# Patient Record
Sex: MALE | Race: WHITE | NOT HISPANIC OR LATINO | Employment: OTHER | URBAN - METROPOLITAN AREA
[De-identification: names, ages, dates, MRNs, and addresses within clinical notes are randomized per-mention and may not be internally consistent; named-entity substitution may affect disease eponyms.]

---

## 2024-06-09 ENCOUNTER — HOSPITAL ENCOUNTER (INPATIENT)
Facility: HOSPITAL | Age: 84
LOS: 8 days | Discharge: HOME | End: 2024-06-18
Attending: STUDENT IN AN ORGANIZED HEALTH CARE EDUCATION/TRAINING PROGRAM | Admitting: FAMILY MEDICINE
Payer: MEDICARE

## 2024-06-09 ENCOUNTER — APPOINTMENT (OUTPATIENT)
Dept: CT IMAGING | Facility: HOSPITAL | Age: 84
End: 2024-06-09
Payer: MEDICARE

## 2024-06-09 ENCOUNTER — APPOINTMENT (OUTPATIENT)
Dept: GENERAL RADIOLOGY | Facility: HOSPITAL | Age: 84
End: 2024-06-09
Payer: MEDICARE

## 2024-06-09 DIAGNOSIS — R63.4 UNINTENTIONAL WEIGHT LOSS: ICD-10-CM

## 2024-06-09 DIAGNOSIS — K52.9 COLITIS: Primary | ICD-10-CM

## 2024-06-09 DIAGNOSIS — K52.9 CHRONIC DIARRHEA: ICD-10-CM

## 2024-06-09 LAB
ALBUMIN SERPL-MCNC: 3.4 G/DL (ref 3.5–5.2)
ALBUMIN/GLOB SERPL: 0.9 G/DL
ALP SERPL-CCNC: 87 U/L (ref 39–117)
ALT SERPL W P-5'-P-CCNC: 9 U/L (ref 1–41)
ANION GAP SERPL CALCULATED.3IONS-SCNC: 13.8 MMOL/L (ref 5–15)
AST SERPL-CCNC: 12 U/L (ref 1–40)
BACTERIA UR QL AUTO: NORMAL /HPF
BASOPHILS # BLD AUTO: 0.11 10*3/MM3 (ref 0–0.2)
BASOPHILS NFR BLD AUTO: 0.7 % (ref 0–1.5)
BILIRUB SERPL-MCNC: 0.8 MG/DL (ref 0–1.2)
BILIRUB UR QL STRIP: ABNORMAL
BUN SERPL-MCNC: 23 MG/DL (ref 8–23)
BUN/CREAT SERPL: 22.5 (ref 7–25)
CALCIUM SPEC-SCNC: 8.9 MG/DL (ref 8.6–10.5)
CHLORIDE SERPL-SCNC: 97 MMOL/L (ref 98–107)
CLARITY UR: CLEAR
CO2 SERPL-SCNC: 22.2 MMOL/L (ref 22–29)
COLOR UR: YELLOW
CREAT SERPL-MCNC: 1.02 MG/DL (ref 0.76–1.27)
D-LACTATE SERPL-SCNC: 1.6 MMOL/L (ref 0.5–2)
DEPRECATED RDW RBC AUTO: 44.6 FL (ref 37–54)
EGFRCR SERPLBLD CKD-EPI 2021: 72 ML/MIN/1.73
EOSINOPHIL # BLD AUTO: 0.19 10*3/MM3 (ref 0–0.4)
EOSINOPHIL NFR BLD AUTO: 1.2 % (ref 0.3–6.2)
ERYTHROCYTE [DISTWIDTH] IN BLOOD BY AUTOMATED COUNT: 13.7 % (ref 12.3–15.4)
GLOBULIN UR ELPH-MCNC: 3.8 GM/DL
GLUCOSE SERPL-MCNC: 130 MG/DL (ref 65–99)
GLUCOSE UR STRIP-MCNC: NEGATIVE MG/DL
HCT VFR BLD AUTO: 38.2 % (ref 37.5–51)
HGB BLD-MCNC: 12.7 G/DL (ref 13–17.7)
HGB UR QL STRIP.AUTO: NEGATIVE
HOLD SPECIMEN: NORMAL
HOLD SPECIMEN: NORMAL
HYALINE CASTS UR QL AUTO: NORMAL /LPF
IMM GRANULOCYTES # BLD AUTO: 0.17 10*3/MM3 (ref 0–0.05)
IMM GRANULOCYTES NFR BLD AUTO: 1.1 % (ref 0–0.5)
KETONES UR QL STRIP: ABNORMAL
LEUKOCYTE ESTERASE UR QL STRIP.AUTO: NEGATIVE
LYMPHOCYTES # BLD AUTO: 1.51 10*3/MM3 (ref 0.7–3.1)
LYMPHOCYTES # BLD MANUAL: 2.1 10*3/MM3 (ref 0.7–3.1)
LYMPHOCYTES NFR BLD AUTO: 9.3 % (ref 19.6–45.3)
LYMPHOCYTES NFR BLD MANUAL: 18 % (ref 5–12)
MAGNESIUM SERPL-MCNC: 2.3 MG/DL (ref 1.6–2.4)
MCH RBC QN AUTO: 29.4 PG (ref 26.6–33)
MCHC RBC AUTO-ENTMCNC: 33.2 G/DL (ref 31.5–35.7)
MCV RBC AUTO: 88.4 FL (ref 79–97)
MONOCYTES # BLD AUTO: 2.92 10*3/MM3 (ref 0.1–0.9)
MONOCYTES # BLD: 2.91 10*3/MM3 (ref 0.1–0.9)
MONOCYTES NFR BLD AUTO: 18.1 % (ref 5–12)
MUCOUS THREADS URNS QL MICRO: NORMAL /HPF
NEUTROPHILS # BLD AUTO: 11.16 10*3/MM3 (ref 1.7–7)
NEUTROPHILS NFR BLD AUTO: 11.27 10*3/MM3 (ref 1.7–7)
NEUTROPHILS NFR BLD AUTO: 69.6 % (ref 42.7–76)
NEUTROPHILS NFR BLD MANUAL: 52 % (ref 42.7–76)
NEUTS BAND NFR BLD MANUAL: 17 % (ref 0–5)
NITRITE UR QL STRIP: NEGATIVE
NRBC BLD AUTO-RTO: 0 /100 WBC (ref 0–0.2)
PH UR STRIP.AUTO: 5.5 [PH] (ref 4.5–8)
PLATELET # BLD AUTO: 428 10*3/MM3 (ref 140–450)
PMV BLD AUTO: 8.3 FL (ref 6–12)
POTASSIUM SERPL-SCNC: 4 MMOL/L (ref 3.5–5.2)
PROT SERPL-MCNC: 7.2 G/DL (ref 6–8.5)
PROT UR QL STRIP: ABNORMAL
RBC # BLD AUTO: 4.32 10*6/MM3 (ref 4.14–5.8)
RBC # UR STRIP: NORMAL /HPF
RBC MORPH BLD: NORMAL
REF LAB TEST METHOD: NORMAL
SMALL PLATELETS BLD QL SMEAR: ADEQUATE
SODIUM SERPL-SCNC: 133 MMOL/L (ref 136–145)
SP GR UR STRIP: 1.01 (ref 1–1.03)
SQUAMOUS #/AREA URNS HPF: NORMAL /HPF
TROPONIN T SERPL HS-MCNC: 16 NG/L
UROBILINOGEN UR QL STRIP: ABNORMAL
VARIANT LYMPHS NFR BLD MANUAL: 13 % (ref 19.6–45.3)
WBC # UR STRIP: NORMAL /HPF
WBC MORPH BLD: NORMAL
WBC NRBC COR # BLD AUTO: 16.17 10*3/MM3 (ref 3.4–10.8)
WHOLE BLOOD HOLD COAG: NORMAL
WHOLE BLOOD HOLD SPECIMEN: NORMAL

## 2024-06-09 PROCEDURE — 93010 ELECTROCARDIOGRAM REPORT: CPT | Performed by: INTERNAL MEDICINE

## 2024-06-09 PROCEDURE — 81001 URINALYSIS AUTO W/SCOPE: CPT | Performed by: STUDENT IN AN ORGANIZED HEALTH CARE EDUCATION/TRAINING PROGRAM

## 2024-06-09 PROCEDURE — 85007 BL SMEAR W/DIFF WBC COUNT: CPT | Performed by: STUDENT IN AN ORGANIZED HEALTH CARE EDUCATION/TRAINING PROGRAM

## 2024-06-09 PROCEDURE — 25810000003 SODIUM CHLORIDE 0.9 % SOLUTION: Performed by: STUDENT IN AN ORGANIZED HEALTH CARE EDUCATION/TRAINING PROGRAM

## 2024-06-09 PROCEDURE — 25810000003 SODIUM CHLORIDE 0.9 % SOLUTION: Performed by: FAMILY MEDICINE

## 2024-06-09 PROCEDURE — 84484 ASSAY OF TROPONIN QUANT: CPT | Performed by: STUDENT IN AN ORGANIZED HEALTH CARE EDUCATION/TRAINING PROGRAM

## 2024-06-09 PROCEDURE — 25010000002 CEFTRIAXONE PER 250 MG: Performed by: FAMILY MEDICINE

## 2024-06-09 PROCEDURE — G0378 HOSPITAL OBSERVATION PER HR: HCPCS

## 2024-06-09 PROCEDURE — 36415 COLL VENOUS BLD VENIPUNCTURE: CPT

## 2024-06-09 PROCEDURE — 71045 X-RAY EXAM CHEST 1 VIEW: CPT

## 2024-06-09 PROCEDURE — 83605 ASSAY OF LACTIC ACID: CPT | Performed by: STUDENT IN AN ORGANIZED HEALTH CARE EDUCATION/TRAINING PROGRAM

## 2024-06-09 PROCEDURE — 93005 ELECTROCARDIOGRAM TRACING: CPT | Performed by: STUDENT IN AN ORGANIZED HEALTH CARE EDUCATION/TRAINING PROGRAM

## 2024-06-09 PROCEDURE — 25010000002 METRONIDAZOLE 500 MG/100ML SOLUTION

## 2024-06-09 PROCEDURE — 74177 CT ABD & PELVIS W/CONTRAST: CPT

## 2024-06-09 PROCEDURE — 99222 1ST HOSP IP/OBS MODERATE 55: CPT | Performed by: FAMILY MEDICINE

## 2024-06-09 PROCEDURE — 25510000001 IOPAMIDOL PER 1 ML: Performed by: STUDENT IN AN ORGANIZED HEALTH CARE EDUCATION/TRAINING PROGRAM

## 2024-06-09 PROCEDURE — 85025 COMPLETE CBC W/AUTO DIFF WBC: CPT | Performed by: STUDENT IN AN ORGANIZED HEALTH CARE EDUCATION/TRAINING PROGRAM

## 2024-06-09 PROCEDURE — 83735 ASSAY OF MAGNESIUM: CPT | Performed by: STUDENT IN AN ORGANIZED HEALTH CARE EDUCATION/TRAINING PROGRAM

## 2024-06-09 PROCEDURE — 80053 COMPREHEN METABOLIC PANEL: CPT | Performed by: STUDENT IN AN ORGANIZED HEALTH CARE EDUCATION/TRAINING PROGRAM

## 2024-06-09 PROCEDURE — 99285 EMERGENCY DEPT VISIT HI MDM: CPT

## 2024-06-09 PROCEDURE — 87507 IADNA-DNA/RNA PROBE TQ 12-25: CPT | Performed by: STUDENT IN AN ORGANIZED HEALTH CARE EDUCATION/TRAINING PROGRAM

## 2024-06-09 RX ORDER — SODIUM CHLORIDE 0.9 % (FLUSH) 0.9 %
10 SYRINGE (ML) INJECTION EVERY 12 HOURS SCHEDULED
Status: DISCONTINUED | OUTPATIENT
Start: 2024-06-09 | End: 2024-06-18 | Stop reason: HOSPADM

## 2024-06-09 RX ORDER — SODIUM CHLORIDE 9 MG/ML
75 INJECTION, SOLUTION INTRAVENOUS CONTINUOUS
Status: DISCONTINUED | OUTPATIENT
Start: 2024-06-09 | End: 2024-06-12

## 2024-06-09 RX ORDER — ONDANSETRON 4 MG/1
4 TABLET, ORALLY DISINTEGRATING ORAL EVERY 6 HOURS PRN
Status: DISCONTINUED | OUTPATIENT
Start: 2024-06-09 | End: 2024-06-18 | Stop reason: HOSPADM

## 2024-06-09 RX ORDER — METRONIDAZOLE 500 MG/100ML
500 INJECTION, SOLUTION INTRAVENOUS ONCE
Status: COMPLETED | OUTPATIENT
Start: 2024-06-09 | End: 2024-06-09

## 2024-06-09 RX ORDER — SODIUM CHLORIDE 0.9 % (FLUSH) 0.9 %
10 SYRINGE (ML) INJECTION AS NEEDED
Status: DISCONTINUED | OUTPATIENT
Start: 2024-06-09 | End: 2024-06-18 | Stop reason: HOSPADM

## 2024-06-09 RX ORDER — NALOXONE HCL 0.4 MG/ML
0.4 VIAL (ML) INJECTION
Status: DISCONTINUED | OUTPATIENT
Start: 2024-06-09 | End: 2024-06-13

## 2024-06-09 RX ORDER — MORPHINE SULFATE 2 MG/ML
2 INJECTION, SOLUTION INTRAMUSCULAR; INTRAVENOUS EVERY 4 HOURS PRN
Status: ACTIVE | OUTPATIENT
Start: 2024-06-09 | End: 2024-06-14

## 2024-06-09 RX ORDER — METRONIDAZOLE 500 MG/100ML
INJECTION, SOLUTION INTRAVENOUS
Status: COMPLETED
Start: 2024-06-09 | End: 2024-06-09

## 2024-06-09 RX ORDER — MULTIPLE VITAMINS W/ MINERALS TAB 9MG-400MCG
1 TAB ORAL DAILY
COMMUNITY

## 2024-06-09 RX ORDER — METRONIDAZOLE 500 MG/100ML
500 INJECTION, SOLUTION INTRAVENOUS EVERY 8 HOURS
Status: DISCONTINUED | OUTPATIENT
Start: 2024-06-10 | End: 2024-06-11

## 2024-06-09 RX ORDER — L.ACID,CASEI/B.ANIMAL/S.THERMO 16B CELL
1 CAPSULE ORAL DAILY
COMMUNITY

## 2024-06-09 RX ORDER — ASPIRIN 325 MG
325 TABLET, DELAYED RELEASE (ENTERIC COATED) ORAL DAILY
COMMUNITY
End: 2024-06-18 | Stop reason: HOSPADM

## 2024-06-09 RX ORDER — SODIUM CHLORIDE 9 MG/ML
40 INJECTION, SOLUTION INTRAVENOUS AS NEEDED
Status: DISCONTINUED | OUTPATIENT
Start: 2024-06-09 | End: 2024-06-18 | Stop reason: HOSPADM

## 2024-06-09 RX ADMIN — Medication 10 ML: at 22:59

## 2024-06-09 RX ADMIN — IOPAMIDOL 100 ML: 755 INJECTION, SOLUTION INTRAVENOUS at 20:47

## 2024-06-09 RX ADMIN — CEFTRIAXONE 1000 MG: 1 INJECTION, POWDER, FOR SOLUTION INTRAMUSCULAR; INTRAVENOUS at 23:49

## 2024-06-09 RX ADMIN — SODIUM CHLORIDE 500 ML: 9 INJECTION, SOLUTION INTRAVENOUS at 21:32

## 2024-06-09 RX ADMIN — METRONIDAZOLE 500 MG: 500 INJECTION, SOLUTION INTRAVENOUS at 21:51

## 2024-06-09 RX ADMIN — SODIUM CHLORIDE 75 ML/HR: 9 INJECTION, SOLUTION INTRAVENOUS at 22:58

## 2024-06-09 RX ADMIN — SODIUM CHLORIDE 500 ML: 9 INJECTION, SOLUTION INTRAVENOUS at 19:25

## 2024-06-09 NOTE — ED PROVIDER NOTES
Subjective   History of Present Illness  Pt is a 84 y.o. male with PMH as listed who presents for   Chief Complaint   Patient presents with    Diarrhea       Patient is an 84-year-old male presents for generalized weakness, lightheadedness, and diarrhea for the past 4 weeks and weight loss of 40 pounds over the past 6 months unintentionally.  States that he is never seen a doctor previously and has never had a colonoscopy before.  Denies any hematochezia or melena at this time.  Denies any other new complaints currently.  No nausea or vomiting currently.  No abdominal pain currently.    Review of Systems    History reviewed. No pertinent past medical history.    No Known Allergies    History reviewed. No pertinent surgical history.    History reviewed. No pertinent family history.    Social History     Socioeconomic History    Marital status:    Tobacco Use    Smoking status: Former     Types: Cigarettes   Substance and Sexual Activity    Alcohol use: Not Currently    Drug use: Never           Objective   Physical Exam  Constitutional:       Appearance: Normal appearance.   HENT:      Head: Normocephalic and atraumatic.      Mouth/Throat:      Mouth: Mucous membranes are moist.      Pharynx: Oropharynx is clear.   Eyes:      Conjunctiva/sclera: Conjunctivae normal.   Cardiovascular:      Rate and Rhythm: Normal rate and regular rhythm.   Pulmonary:      Effort: Pulmonary effort is normal.      Breath sounds: Normal breath sounds.   Abdominal:      General: Abdomen is flat.      Palpations: Abdomen is soft.      Tenderness: There is no abdominal tenderness.   Musculoskeletal:      Cervical back: Neck supple.   Skin:     General: Skin is warm and dry.   Neurological:      Mental Status: He is alert.   Psychiatric:         Mood and Affect: Mood normal.         Procedures           ED Course  ED Course as of 06/09/24 2148   Sun Jun 09, 2024 1947 Patient is a an 84-year-old male presents for lightheadedness,  generalized weakness, and diarrhea for the past 4 months with a 40 pound weight loss over the past several months.  Exam is fairly unremarkable other than tachycardia.  Will obtain CBC, CMP, magnesium level, troponin, UA and CT abdomen pelvis with contrast to evaluate for concerning history for possible intra-abdominal mass.  Patient given 500 mL bolus due to patient not seeing PCP previously and unknown medical history. [JF]   2147 Lab workup significant for white count of 16.17, CT significant for a significant colitis but no evidence of obstruction and no masses noted on this CT.  Discussed with him possibility for inpatient versus outpatient management, given that he has never followed up with a primary care doctor in the past or had a colonoscopy or any other prior medical care he is uncertain about his ability to follow-up outpatient and agrees with inpatient care.  Discussed with Dr. Rodriguez with hospital service regarding this plan, he agrees to admit for further care.  [JF]      ED Course User Index  [JF] Charles Mehta MD                                             Medical Decision Making  My differential diagnosis for diarrhea includes but is not limited to viral gastroenteritis, bacterial gastroenteritis, food poisoning, C. Difficile, bacterial infections, viral infections, fungal infections, parasitic infections, COVID-19, toxins and laxative abuse      Problems Addressed:  Colitis: complicated acute illness or injury    Amount and/or Complexity of Data Reviewed  Labs: ordered. Decision-making details documented in ED Course.  Radiology: ordered. Decision-making details documented in ED Course.  ECG/medicine tests: ordered.     Details: EKG  6/9/2024 at 1925  Rhythm irregularly irregular, rate 98  Normal axis, normal intervals, no ST changes, Q-wave lead III and aVF  EKG interpreted by me contemporaneously by me with care    Discussion of management or test interpretation with external provider(s):  Spoke with Dr. Strong regarding patient's presentation and exam findings significant for colitis and discussion with patient regarding inpatient versus outpatient management.  He agrees to admit patient for further antibiotics and management.    Risk  Prescription drug management.  Decision regarding hospitalization.        Final diagnoses:   Colitis       ED Disposition  ED Disposition       ED Disposition   Decision to Admit    Condition   --    Comment   Level of Care: Med/Surg [1]   Admitting Physician: ROMA STRONG [[id number]]                 No follow-up provider specified.       Medication List      No changes were made to your prescriptions during this visit.            Charles Mehta MD  06/09/24 3434

## 2024-06-09 NOTE — ED NOTES
Pt states that he las lost 40 lbs in a 3 month time frame. Pt states that he has had no appetite x 1 week and hasn't eaten anything the past 2 days

## 2024-06-09 NOTE — Clinical Note
Level of Care: Med/Surg [1]   Diagnosis: Colitis [846192]   Admitting Physician: ROMA STRONG [[id number]]

## 2024-06-10 PROBLEM — K52.9 ACUTE COLITIS: Status: ACTIVE | Noted: 2024-06-10

## 2024-06-10 LAB
ADV 40+41 DNA STL QL NAA+NON-PROBE: NOT DETECTED
ANION GAP SERPL CALCULATED.3IONS-SCNC: 12.2 MMOL/L (ref 5–15)
ASTRO TYP 1-8 RNA STL QL NAA+NON-PROBE: NOT DETECTED
BASOPHILS # BLD AUTO: 0.1 10*3/MM3 (ref 0–0.2)
BASOPHILS NFR BLD AUTO: 0.7 % (ref 0–1.5)
BUN SERPL-MCNC: 22 MG/DL (ref 8–23)
BUN/CREAT SERPL: 28.9 (ref 7–25)
C CAYETANENSIS DNA STL QL NAA+NON-PROBE: NOT DETECTED
C COLI+JEJ+UPSA DNA STL QL NAA+NON-PROBE: NOT DETECTED
C DIFF GDH + TOXINS A+B STL QL IA.RAPID: NEGATIVE
C DIFF GDH + TOXINS A+B STL QL IA.RAPID: NEGATIVE
CALCIUM SPEC-SCNC: 8.2 MG/DL (ref 8.6–10.5)
CHLORIDE SERPL-SCNC: 100 MMOL/L (ref 98–107)
CO2 SERPL-SCNC: 21.8 MMOL/L (ref 22–29)
CREAT SERPL-MCNC: 0.76 MG/DL (ref 0.76–1.27)
CRYPTOSP DNA STL QL NAA+NON-PROBE: NOT DETECTED
DEPRECATED RDW RBC AUTO: 45.1 FL (ref 37–54)
E HISTOLYT DNA STL QL NAA+NON-PROBE: NOT DETECTED
EAEC PAA PLAS AGGR+AATA ST NAA+NON-PRB: NOT DETECTED
EC STX1+STX2 GENES STL QL NAA+NON-PROBE: NOT DETECTED
EGFRCR SERPLBLD CKD-EPI 2021: 88.1 ML/MIN/1.73
EOSINOPHIL # BLD AUTO: 0.02 10*3/MM3 (ref 0–0.4)
EOSINOPHIL NFR BLD AUTO: 0.1 % (ref 0.3–6.2)
EPEC EAE GENE STL QL NAA+NON-PROBE: NOT DETECTED
ERYTHROCYTE [DISTWIDTH] IN BLOOD BY AUTOMATED COUNT: 13.8 % (ref 12.3–15.4)
ETEC LTA+ST1A+ST1B TOX ST NAA+NON-PROBE: NOT DETECTED
G LAMBLIA DNA STL QL NAA+NON-PROBE: NOT DETECTED
GLUCOSE SERPL-MCNC: 114 MG/DL (ref 65–99)
HCT VFR BLD AUTO: 34.6 % (ref 37.5–51)
HGB BLD-MCNC: 11.4 G/DL (ref 13–17.7)
IMM GRANULOCYTES # BLD AUTO: 0.15 10*3/MM3 (ref 0–0.05)
IMM GRANULOCYTES NFR BLD AUTO: 1.1 % (ref 0–0.5)
LYMPHOCYTES # BLD AUTO: 1.2 10*3/MM3 (ref 0.7–3.1)
LYMPHOCYTES NFR BLD AUTO: 8.5 % (ref 19.6–45.3)
MCH RBC QN AUTO: 29.2 PG (ref 26.6–33)
MCHC RBC AUTO-ENTMCNC: 32.9 G/DL (ref 31.5–35.7)
MCV RBC AUTO: 88.7 FL (ref 79–97)
MONOCYTES # BLD AUTO: 2.21 10*3/MM3 (ref 0.1–0.9)
MONOCYTES NFR BLD AUTO: 15.6 % (ref 5–12)
NEUTROPHILS NFR BLD AUTO: 10.48 10*3/MM3 (ref 1.7–7)
NEUTROPHILS NFR BLD AUTO: 74 % (ref 42.7–76)
NOROVIRUS GI+II RNA STL QL NAA+NON-PROBE: NOT DETECTED
NRBC BLD AUTO-RTO: 0 /100 WBC (ref 0–0.2)
P SHIGELLOIDES DNA STL QL NAA+NON-PROBE: NOT DETECTED
PLATELET # BLD AUTO: 396 10*3/MM3 (ref 140–450)
PMV BLD AUTO: 8.9 FL (ref 6–12)
POTASSIUM SERPL-SCNC: 4.1 MMOL/L (ref 3.5–5.2)
QT INTERVAL: 348 MS
QTC INTERVAL: 445 MS
RBC # BLD AUTO: 3.9 10*6/MM3 (ref 4.14–5.8)
RVA RNA STL QL NAA+NON-PROBE: NOT DETECTED
S ENT+BONG DNA STL QL NAA+NON-PROBE: NOT DETECTED
SAPO I+II+IV+V RNA STL QL NAA+NON-PROBE: NOT DETECTED
SHIGELLA SP+EIEC IPAH ST NAA+NON-PROBE: NOT DETECTED
SODIUM SERPL-SCNC: 134 MMOL/L (ref 136–145)
V CHOL+PARA+VUL DNA STL QL NAA+NON-PROBE: NOT DETECTED
V CHOLERAE DNA STL QL NAA+NON-PROBE: NOT DETECTED
WBC NRBC COR # BLD AUTO: 14.16 10*3/MM3 (ref 3.4–10.8)
Y ENTEROCOL DNA STL QL NAA+NON-PROBE: NOT DETECTED

## 2024-06-10 PROCEDURE — 94799 UNLISTED PULMONARY SVC/PX: CPT

## 2024-06-10 PROCEDURE — 25010000002 METRONIDAZOLE 500 MG/100ML SOLUTION: Performed by: FAMILY MEDICINE

## 2024-06-10 PROCEDURE — 63710000001 ONDANSETRON ODT 4 MG TABLET DISPERSIBLE: Performed by: FAMILY MEDICINE

## 2024-06-10 PROCEDURE — 99222 1ST HOSP IP/OBS MODERATE 55: CPT | Performed by: STUDENT IN AN ORGANIZED HEALTH CARE EDUCATION/TRAINING PROGRAM

## 2024-06-10 PROCEDURE — 99232 SBSQ HOSP IP/OBS MODERATE 35: CPT | Performed by: HOSPITALIST

## 2024-06-10 PROCEDURE — 87045 FECES CULTURE AEROBIC BACT: CPT | Performed by: FAMILY MEDICINE

## 2024-06-10 PROCEDURE — 87449 NOS EACH ORGANISM AG IA: CPT | Performed by: FAMILY MEDICINE

## 2024-06-10 PROCEDURE — 83993 ASSAY FOR CALPROTECTIN FECAL: CPT | Performed by: STUDENT IN AN ORGANIZED HEALTH CARE EDUCATION/TRAINING PROGRAM

## 2024-06-10 PROCEDURE — 25810000003 SODIUM CHLORIDE 0.9 % SOLUTION: Performed by: FAMILY MEDICINE

## 2024-06-10 PROCEDURE — 80048 BASIC METABOLIC PNL TOTAL CA: CPT | Performed by: FAMILY MEDICINE

## 2024-06-10 PROCEDURE — 87324 CLOSTRIDIUM AG IA: CPT | Performed by: FAMILY MEDICINE

## 2024-06-10 PROCEDURE — 87427 SHIGA-LIKE TOXIN AG IA: CPT | Performed by: FAMILY MEDICINE

## 2024-06-10 PROCEDURE — 85025 COMPLETE CBC W/AUTO DIFF WBC: CPT | Performed by: FAMILY MEDICINE

## 2024-06-10 PROCEDURE — 87046 STOOL CULTR AEROBIC BACT EA: CPT | Performed by: FAMILY MEDICINE

## 2024-06-10 RX ORDER — SIMETHICONE 80 MG
120 TABLET,CHEWABLE ORAL
Status: DISCONTINUED | OUTPATIENT
Start: 2024-06-10 | End: 2024-06-18 | Stop reason: HOSPADM

## 2024-06-10 RX ADMIN — SIMETHICONE 120 MG: 80 TABLET, CHEWABLE ORAL at 18:23

## 2024-06-10 RX ADMIN — METRONIDAZOLE 500 MG: 500 INJECTION, SOLUTION INTRAVENOUS at 09:17

## 2024-06-10 RX ADMIN — ONDANSETRON 4 MG: 4 TABLET, ORALLY DISINTEGRATING ORAL at 09:56

## 2024-06-10 RX ADMIN — SIMETHICONE 120 MG: 80 TABLET, CHEWABLE ORAL at 21:16

## 2024-06-10 RX ADMIN — SODIUM CHLORIDE 75 ML/HR: 9 INJECTION, SOLUTION INTRAVENOUS at 12:46

## 2024-06-10 RX ADMIN — Medication 10 ML: at 09:17

## 2024-06-10 RX ADMIN — Medication 10 ML: at 21:19

## 2024-06-10 RX ADMIN — METRONIDAZOLE 500 MG: 500 INJECTION, SOLUTION INTRAVENOUS at 16:56

## 2024-06-10 NOTE — PLAN OF CARE
Problem: Adjustment to Illness (Sepsis/Septic Shock)  Goal: Optimal Coping  Outcome: Ongoing, Progressing  Intervention: Optimize Psychosocial Adjustment to Illness  Recent Flowsheet Documentation  Taken 6/10/2024 0800 by Radha Craft RN  Family/Support System Care: caregiver stress acknowledged     Problem: Bleeding (Sepsis/Septic Shock)  Goal: Absence of Bleeding  Outcome: Ongoing, Progressing     Problem: Glycemic Control Impaired (Sepsis/Septic Shock)  Goal: Blood Glucose Level Within Desired Range  Outcome: Ongoing, Progressing     Problem: Infection Progression (Sepsis/Septic Shock)  Goal: Absence of Infection Signs and Symptoms  Outcome: Ongoing, Progressing  Intervention: Initiate Sepsis Management  Recent Flowsheet Documentation  Taken 6/10/2024 0800 by Radha Craft RN  Infection Prevention: personal protective equipment utilized  Isolation Precautions: precautions maintained  Intervention: Promote Recovery  Recent Flowsheet Documentation  Taken 6/10/2024 0800 by Radha Craft RN  Activity Management: up ad viral     Problem: Nutrition Impaired (Sepsis/Septic Shock)  Goal: Optimal Nutrition Intake  Outcome: Ongoing, Progressing     Problem: Adult Inpatient Plan of Care  Goal: Plan of Care Review  Outcome: Ongoing, Progressing  Goal: Patient-Specific Goal (Individualized)  Outcome: Ongoing, Progressing  Goal: Absence of Hospital-Acquired Illness or Injury  Outcome: Ongoing, Progressing  Intervention: Identify and Manage Fall Risk  Recent Flowsheet Documentation  Taken 6/10/2024 0800 by Radha Craft RN  Safety Promotion/Fall Prevention: safety round/check completed  Intervention: Prevent Skin Injury  Recent Flowsheet Documentation  Taken 6/10/2024 0800 by Radha Craft RN  Body Position: position changed independently  Intervention: Prevent and Manage VTE (Venous Thromboembolism) Risk  Recent Flowsheet Documentation  Taken 6/10/2024 0800 by Radha Craft RN  Activity  Management: up ad viral  VTE Prevention/Management:   patient refused intervention   sequential compression devices off  Range of Motion: ROM (range of motion) performed  Intervention: Prevent Infection  Recent Flowsheet Documentation  Taken 6/10/2024 0800 by Radha Craft RN  Infection Prevention: personal protective equipment utilized  Goal: Optimal Comfort and Wellbeing  Outcome: Ongoing, Progressing  Intervention: Provide Person-Centered Care  Recent Flowsheet Documentation  Taken 6/10/2024 0800 by Radha Craft RN  Trust Relationship/Rapport:   care explained   choices provided  Goal: Readiness for Transition of Care  Outcome: Ongoing, Progressing   Goal Outcome Evaluation:

## 2024-06-10 NOTE — PLAN OF CARE
Goal Outcome Evaluation:  Plan of Care Reviewed With: patient        Progress: no change  Outcome Evaluation: pt admitted overnight to [facility]. oriented on room and educated on call light. has multiple bowel movements overnight. wife at bedside when brought to the floor. pt ambulates ad viral. states he has not been to a doctor since 1962 and he has no pmh.

## 2024-06-10 NOTE — PROGRESS NOTES
"Hospitalist Team      Patient Care Team:  Provider, No Known as PCP - General        Chief Complaint:  Follow-up Colitis    Subjective    Mr. Thayer feels a little better this afternoon.  Stools are little bit more formed and frequency has decreased.  He denies chest pain and dyspnea.  He reports no issues getting up to the bathroom.  Not tolerating much p.o. as it is making him \"dry heave\".    Objective    Vital Signs  Temp:  [97.5 °F (36.4 °C)-98.4 °F (36.9 °C)] 98.3 °F (36.8 °C)  Heart Rate:  [] 96  Resp:  [16-20] 20  BP: (126-163)/() 139/74  Oxygen Therapy  SpO2: 96 %  Pulse Oximetry Type: Intermittent  Device (Oxygen Therapy): room air}    Flowsheet Rows      Flowsheet Row First Filed Value   Admission Height 180.3 cm (71\") Documented at 06/09/2024 1920   Admission Weight 70.3 kg (155 lb) Documented at 06/09/2024 1920          Physical Exam:    General: Appears stated age in no acute distress.  Lungs: Breath sounds are clear throughout all fields.  Respirations are nonlabored.  CV: Regular rate and rhythm.  I appreciate no murmur.  Radial pulses are 2+ and symmetric.  Abdomen: Mildly distended but soft.  Bowel sounds are active.  MSK: No clubbing, cyanosis, or edema.  Neuro: CN II-XII grossly intact.  Psych: Pleasant affect.  Oriented x 3.    Results Review:     I reviewed the patient's new clinical results.    Lab Results (last 24 hours)       Procedure Component Value Units Date/Time    Basic Metabolic Panel [[id number]]  (Abnormal) Collected: 06/10/24 0341    Specimen: Blood Updated: 06/10/24 0445     Glucose 114 mg/dL      BUN 22 mg/dL      Creatinine 0.76 mg/dL      Sodium 134 mmol/L      Potassium 4.1 mmol/L      Chloride 100 mmol/L      CO2 21.8 mmol/L      Calcium 8.2 mg/dL      BUN/Creatinine Ratio 28.9     Anion Gap 12.2 mmol/L      eGFR 88.1 mL/min/1.73     Narrative:      GFR Normal >60  Chronic Kidney Disease <60  Kidney Failure <15    The GFR formula is only valid for adults with stable " renal function between ages 18 and 70.    CBC & Differential [[id number]]  (Abnormal) Collected: 06/10/24 0341    Specimen: Blood Updated: 06/10/24 0429    Narrative:      The following orders were created for panel order CBC & Differential.  Procedure                               Abnormality         Status                     ---------                               -----------         ------                     CBC Auto Differential[[id number]]        Abnormal            Final result               Scan Slide[[id number]]                                                                    Please view results for these tests on the individual orders.    CBC Auto Differential [[id number]]  (Abnormal) Collected: 06/10/24 0341    Specimen: Blood Updated: 06/10/24 0428     WBC 14.16 10*3/mm3      RBC 3.90 10*6/mm3      Hemoglobin 11.4 g/dL      Hematocrit 34.6 %      MCV 88.7 fL      MCH 29.2 pg      MCHC 32.9 g/dL      RDW 13.8 %      RDW-SD 45.1 fl      MPV 8.9 fL      Platelets 396 10*3/mm3      Neutrophil % 74.0 %      Lymphocyte % 8.5 %      Monocyte % 15.6 %      Eosinophil % 0.1 %      Basophil % 0.7 %      Immature Grans % 1.1 %      Neutrophils, Absolute 10.48 10*3/mm3      Lymphocytes, Absolute 1.20 10*3/mm3      Monocytes, Absolute 2.21 10*3/mm3      Eosinophils, Absolute 0.02 10*3/mm3      Basophils, Absolute 0.10 10*3/mm3      Immature Grans, Absolute 0.15 10*3/mm3      nRBC 0.0 /100 WBC     Clostridioides difficile Toxin - Stool, Per Rectum [[id number]]  (Normal) Collected: 06/10/24 0009    Specimen: Stool from Per Rectum Updated: 06/10/24 0047    Narrative:      The following orders were created for panel order Clostridioides difficile Toxin - Stool, Per Rectum.  Procedure                               Abnormality         Status                     ---------                               -----------         ------                     Clostridioides difficile...[[id number]]  Normal              Final result                  Please view results for these tests on the individual orders.    Clostridioides difficile EIA - Stool, Per Rectum [[id number]]  (Normal) Collected: 06/10/24 0009    Specimen: Stool from Per Rectum Updated: 06/10/24 0047     C Diff GDH Ag Negative     C.diff Toxin Ag Negative    Narrative:      The result indicates the absence of toxigenic C.difficile from stool specimen.    Stool Culture (Reference Lab) - Stool, Per Rectum [[id number]] Collected: 06/10/24 0009    Specimen: Stool from Per Rectum Updated: 06/10/24 0010    Urinalysis, Microscopic Only - Urine, Clean Catch [[id number]] Collected: 06/09/24 2259    Specimen: Urine, Clean Catch Updated: 06/09/24 2307     RBC, UA None Seen /HPF      WBC, UA None Seen /HPF      Bacteria, UA None Seen /HPF      Squamous Epithelial Cells, UA 0-2 /HPF      Hyaline Casts, UA None Seen /LPF      Mucus, UA Trace /HPF      Methodology Manual Light Microscopy    Urinalysis With Microscopic If Indicated (No Culture) - Urine, Clean Catch [[id number]]  (Abnormal) Collected: 06/09/24 2259    Specimen: Urine, Clean Catch Updated: 06/09/24 2305     Color, UA Yellow     Appearance, UA Clear     pH, UA 5.5     Specific Gravity, UA 1.010     Glucose, UA Negative     Ketones, UA 15 mg/dL (1+)     Bilirubin, UA Small (1+)     Blood, UA Negative     Protein, UA 30 mg/dL (1+)     Leuk Esterase, UA Negative     Nitrite, UA Negative     Urobilinogen, UA 1.0 E.U./dL    Gastrointestinal Panel, PCR - Stool, Per Rectum [[id number]] Collected: 06/09/24 2259    Specimen: Stool from Per Rectum Updated: 06/09/24 2301    Lactic Acid, Plasma [[id number]]  (Normal) Collected: 06/09/24 2026    Specimen: Blood from Arm, Left Updated: 06/09/24 2043     Lactate 1.6 mmol/L     Comprehensive Metabolic Panel [[id number]]  (Abnormal) Collected: 06/09/24 1929    Specimen: Blood Updated: 06/09/24 1952     Glucose 130 mg/dL      BUN 23 mg/dL      Creatinine 1.02 mg/dL      Sodium 133 mmol/L      Potassium 4.0 mmol/L       Chloride 97 mmol/L      CO2 22.2 mmol/L      Calcium 8.9 mg/dL      Total Protein 7.2 g/dL      Albumin 3.4 g/dL      ALT (SGPT) 9 U/L      AST (SGOT) 12 U/L      Alkaline Phosphatase 87 U/L      Total Bilirubin 0.8 mg/dL      Globulin 3.8 gm/dL      A/G Ratio 0.9 g/dL      BUN/Creatinine Ratio 22.5     Anion Gap 13.8 mmol/L      eGFR 72.0 mL/min/1.73     Narrative:      GFR Normal >60  Chronic Kidney Disease <60  Kidney Failure <15    The GFR formula is only valid for adults with stable renal function between ages 18 and 70.    Single High Sensitivity Troponin T [[id number]]  (Normal) Collected: 06/09/24 1929    Specimen: Blood Updated: 06/09/24 1952     HS Troponin T 16 ng/L     Narrative:      High Sensitive Troponin T Reference Range:  <14.0 ng/L- Negative Female for AMI  <22.0 ng/L- Negative Male for AMI  >=14 - Abnormal Female indicating possible myocardial injury.  >=22 - Abnormal Male indicating possible myocardial injury.   Clinicians would have to utilize clinical acumen, EKG, Troponin, and serial changes to determine if it is an Acute Myocardial Infarction or myocardial injury due to an underlying chronic condition.         Magnesium [[id number]]  (Normal) Collected: 06/09/24 1929    Specimen: Blood Updated: 06/09/24 1952     Magnesium 2.3 mg/dL     CBC & Differential [[id number]]  (Abnormal) Collected: 06/09/24 1929    Specimen: Blood Updated: 06/09/24 1949    Narrative:      The following orders were created for panel order CBC & Differential.  Procedure                               Abnormality         Status                     ---------                               -----------         ------                     CBC Auto Differential[[id number]]        Abnormal            Final result               Scan Slide[[id number]]                                                                    Please view results for these tests on the individual orders.    Manual Differential [[id number]]  (Abnormal)  Collected: 06/09/24 1929    Specimen: Blood Updated: 06/09/24 1949     Neutrophil % 52.0 %      Lymphocyte % 13.0 %      Monocyte % 18.0 %      Bands %  17.0 %      Neutrophils Absolute 11.16 10*3/mm3      Lymphocytes Absolute 2.10 10*3/mm3      Monocytes Absolute 2.91 10*3/mm3      RBC Morphology Normal     WBC Morphology Normal     Platelet Estimate Adequate    [place] Draw [[id number]] Collected: 06/09/24 1929    Specimen: Blood Updated: 06/09/24 1947    Narrative:      The following orders were created for panel order [place] Draw.  Procedure                               Abnormality         Status                     ---------                               -----------         ------                     Green Top (Gel)[[id number]]                                  Final result               Lavender Top[[id number]]                                     Final result               Gold Top - SST[[id number]]                                   Final result               Light Blue Top[[id number]]                                   Final result                 Please view results for these tests on the individual orders.    Green Top (Gel) [[id number]] Collected: 06/09/24 1929    Specimen: Blood Updated: 06/09/24 1947     Extra Tube Hold for add-ons.     Comment: Auto resulted.       Lavender Top [[id number]] Collected: 06/09/24 1929    Specimen: Blood Updated: 06/09/24 1947     Extra Tube hold for add-on     Comment: Auto resulted       Gold Top - SST [[id number]] Collected: 06/09/24 1929    Specimen: Blood Updated: 06/09/24 1947     Extra Tube Hold for add-ons.     Comment: Auto resulted.       Light Blue Top [[id number]] Collected: 06/09/24 1929    Specimen: Blood Updated: 06/09/24 1947     Extra Tube Hold for add-ons.     Comment: Auto resulted       CBC Auto Differential [[id number]]  (Abnormal) Collected: 06/09/24 1929    Specimen: Blood Updated: 06/1939     WBC 16.17 10*3/mm3      RBC 4.32 10*6/mm3      Hemoglobin 12.7  g/dL      Hematocrit 38.2 %      MCV 88.4 fL      MCH 29.4 pg      MCHC 33.2 g/dL      RDW 13.7 %      RDW-SD 44.6 fl      MPV 8.3 fL      Platelets 428 10*3/mm3      Neutrophil % 69.6 %      Lymphocyte % 9.3 %      Monocyte % 18.1 %      Eosinophil % 1.2 %      Basophil % 0.7 %      Immature Grans % 1.1 %      Neutrophils, Absolute 11.27 10*3/mm3      Lymphocytes, Absolute 1.51 10*3/mm3      Monocytes, Absolute 2.92 10*3/mm3      Eosinophils, Absolute 0.19 10*3/mm3      Basophils, Absolute 0.11 10*3/mm3      Immature Grans, Absolute 0.17 10*3/mm3      nRBC 0.0 /100 WBC             Imaging Results (Last 24 Hours)       Procedure Component Value Units Date/Time    CT Abdomen Pelvis With Contrast [[id number]] Collected: 06/09/24 2052     Updated: 06/09/24 2102    Narrative:      CT ABDOMEN PELVIS W CONTRAST    Date of Exam: 6/9/2024 8:46 PM EDT    Indication: diarrhea since Oct, weakness, loss of appetite, 40# weight loss, no known med hx.    Comparison: None available.    Technique: Axial CT images were obtained of the abdomen and pelvis following the uneventful intravenous administration of iodinated contrast. Sagittal and coronal reconstructions were performed.  Automated exposure control and iterative reconstruction   methods were used.    Findings:  Lung Bases:     The lung bases are clear. There is mild coronary artery calcific atherosclerosis.    Liver:  There is mild fatty infiltration of liver. There are no focal liver lesions. There is no intrahepatic biliary ductal dilatation.    Biliary/Gallbladder:    There is a large stone in the neck of the gallbladder. There is no pericholecystic fluid or gallbladder wall thickening. The biliary tree is nondilated.    Spleen:  Spleen is normal in size and CT density.    Pancreas:    Pancreas is normal. There is no evidence of pancreatic mass or peripancreatic fluid.    Kidneys:    Kidneys are normal in size. There are no stones or hydronephrosis.    Adrenals:    Adrenal  glands are unremarkable.    Retroperitoneal/Lymph Nodes/Vasculature:    No retroperitoneal adenopathy is identified.    Gastrointestinal/Mesentery:    There is an abnormal appearance of the colon. Primarily involving the ascending and transverse portions of the colon, there is wall thickening and a fluid-filled appearance of the colon with surrounding fat stranding consistent with colitis. The distal   colon also demonstrates some dilatation or wall thickening but without intraluminal fluid. The small bowel is unremarkable with a few fluid-filled loops but without evidence of inflammation. There is no free air. The appendix is dilated and fluid-filled   but appears to be without significant inflammatory change    Bladder:    The bladder is normal.    Genital:     Unremarkable          Bony Structures:     Visualized bony structures are consistent with the patient's age.        Impression:      Impression:  Abnormal appearance of the colon. The ascending and transverse portions of the colon are fluid-filled and dilated with wall thickening and surrounding fat stranding consistent with colitis. The distal colon also demonstrates some wall thickening but   without intraluminal fluid. The appendix is dilated and fluid-filled but without significant inflammatory change.        Electronically Signed: Sigifredo Lee MD    6/9/2024 8:59 PM EDT    Workstation ID: [id number]    XR Chest 1 View [[id number]] Collected: 06/09/24 2049     Updated: 06/09/24 2053    Narrative:      XR CHEST 1 VW    Date of Exam: 6/9/2024 8:26 PM EDT    Indication: tachycardia, leukocytosis, no source    Comparison: None available.    Findings:  There are no airspace consolidations. No pleural fluid. No pneumothorax. The pulmonary vasculature appears within normal limits. The cardiac and mediastinal silhouette appear unremarkable. No acute osseous abnormality identified.      Impression:      Impression:  No active disease.        Electronically Signed:  Sigifredo Lee MD    6/9/2024 8:50 PM EDT    Workstation ID: [id number]              Medication Review:   I have reviewed the patient's current medication list    Current Facility-Administered Medications:     cefTRIAXone (ROCEPHIN) 1,000 mg in sodium chloride 0.9 % 100 mL IVPB-VTB, 1,000 mg, Intravenous, Q24H, James Rodriguez DO, Stopped at 06/10/24 0046    metroNIDAZOLE (FLAGYL) IVPB 500 mg, 500 mg, Intravenous, Q8H, James Rodriguez DO, Last Rate: 200 mL/hr at 06/10/24 0917, 500 mg at 06/10/24 0917    morphine injection 2 mg, 2 mg, Intravenous, Q4H PRN **AND** naloxone (NARCAN) injection 0.4 mg, 0.4 mg, Intravenous, Q5 Min PRN, James Rodriguez DO    ondansetron ODT (ZOFRAN-ODT) disintegrating tablet 4 mg, 4 mg, Oral, Q6H PRN, James Rodriguez DO, 4 mg at 06/10/24 0956    sodium chloride 0.9 % flush 10 mL, 10 mL, Intravenous, PRN, Charles Mehta MD    sodium chloride 0.9 % flush 10 mL, 10 mL, Intravenous, Q12H, James Rodriguez DO, 10 mL at 06/10/24 0917    sodium chloride 0.9 % flush 10 mL, 10 mL, Intravenous, PRN, James Rodriguez DO    sodium chloride 0.9 % infusion 40 mL, 40 mL, Intravenous, PRN, James Rodriguez DO    sodium chloride 0.9 % infusion, 75 mL/hr, Intravenous, Continuous, James Rodriguez DO, Last Rate: 75 mL/hr at 06/09/24 2258, 75 mL/hr at 06/09/24 2258      Assessment & Plan     Acute Colitis: Imaging and testing on a consistent with C. difficile.  Await further culture studies.  Appears clinically better so we will continue Rocephin and Flagyl.  Continue IVF    Plan for disposition: Predicated on hospital course.    Darren Marinelli MD  06/10/24  10:16 EDT

## 2024-06-10 NOTE — ED NOTES
Dr. Mehta informed of + simple sepsis. MD only wants a lactic acid, no blood cultures to be done at this point

## 2024-06-10 NOTE — CASE MANAGEMENT/SOCIAL WORK
"Continued Stay Note  ANGELO Hassan     Patient Name: Mehnaz Thayer  MRN: [id number]  Today's Date: 6/10/2024    Admit Date: 6/9/2024    Plan: Plan home with wife   Discharge Plan       Row Name 06/10/24 1542       Plan    Plan Plan home with wife    Patient/Family in Agreement with Plan yes    Plan Comments Spoke with patient at bedside, permission to speak with wife present. Face sheet verified. IMM explained, signed and copy declined. Patient lives in a home with his wife. He is independent of ADLs including driving. He has a cane he uses \"once in a while.\" He has not used HH or inpatient rehab services previously. He does not have a living will. He does not have a PCP and has not been to the doctor since 1962. He uses [facility] and denies issues obtaining medications. There are no concerns r/t food, housing, utilities or transportation. CM # placed on white board, [facility] for dc needs.                   Discharge Codes    No documentation.                       Maxim Mcconnell RN    "

## 2024-06-10 NOTE — H&P
[facility] MEDICAL [facility] HOSPITALIST     PCP: Provider, No Known    CODE status: Full     CHIEF COMPLAINT: Abdominal pain     HISTORY OF PRESENT ILLNESS:    83 y/o male with no significant PMHx, presents to [facility] ED for evaluation of diarrhea and diminished appetite.  Patient says he started having diarrhea in October of last year.  It occurred intermittently over the last several months.  He has had diminished appetite and has lost about 30-40 lbs over this time.  Over the last few days, he had more frequent watery diarrhea and has had little appetite.  He denies any dark or red stools, nausea, or vomiting.  He denies any recent antibiotic use.        History reviewed. No pertinent past medical history.  History reviewed. No pertinent surgical history.  History reviewed. No pertinent family history.  Social History     Tobacco Use    Smoking status: Former     Types: Cigarettes   Vaping Use    Vaping status: Never Used   Substance Use Topics    Alcohol use: Not Currently    Drug use: Never     Medications Prior to Admission   Medication Sig Dispense Refill Last Dose    aspirin 325 MG EC tablet Take 1 tablet by mouth Daily.   Past Week    multivitamin with minerals (MULTIVITAMIN ADULT PO) Take 1 tablet by mouth Daily.   Past Week    NON FORMULARY Take 1 tablet by mouth Daily. Coconut oil tablet   Past Week    Probiotic Product (Risaquad-2) capsule capsule Take 1 capsule by mouth Daily.   Past Week     Allergies:  Patient has no known allergies.      There is no immunization history on file for this patient.    REVIEW OF SYSTEMS:  Please see the above history of present illness for pertinent positives and negatives.  The remainder of the patient's systems have been reviewed and are negative.     Vital Signs  Temp:  [97.5 °F (36.4 °C)-98.2 °F (36.8 °C)] 97.5 °F (36.4 °C)  Heart Rate:  [] 100  Resp:  [16-18] 16  BP: (126-148)/() 126/103  Flowsheet Rows      Flowsheet Row First Filed Value  "  Admission Height 180.3 cm (71\") Documented at 06/09/2024 1920   Admission Weight 70.3 kg (155 lb) Documented at 06/09/2024 1920               Physical Exam:  General: Lying in bed; NAD  HENT: Head is atraumatic, normocephalic. Hearing is grossly intact. Nose is without obvious congestion and appears patent. Neck is supple and trachea is midline.   Eyes: Vision is grossly intact. Pupils appear equal and round.   Cardiovascular: Mildly tachycardic; regular rhythm  Respiratory: Lungs are clear to ausculation without wheezes, rhonchi or rales.   Abdominal/GI: Soft, mildly distended; NT, +BS  Extremities: No peripheral edema noted.   Musculoskeletal: Spontaneous movement of bilateral upper and lower extremities against gravity noted. No signs of injury or deformity noted.   Skin: Warm and dry.   Psych: Mood and affect are appropriate. Cooperative with exam.   Neuro: No facial asymmetry noted. No focal deficits noted, hearing and vision are grossly intact.         Results Review:    I reviewed the patient's new clinical results.  Lab Results (most recent)       Procedure Component Value Units Date/Time    Lactic Acid, Plasma [[id number]]  (Normal) Collected: 06/09/24 2026    Specimen: Blood from Arm, Left Updated: 06/09/24 2043     Lactate 1.6 mmol/L     Comprehensive Metabolic Panel [[id number]]  (Abnormal) Collected: 06/09/24 1929    Specimen: Blood Updated: 06/09/24 1952     Glucose 130 mg/dL      BUN 23 mg/dL      Creatinine 1.02 mg/dL      Sodium 133 mmol/L      Potassium 4.0 mmol/L      Chloride 97 mmol/L      CO2 22.2 mmol/L      Calcium 8.9 mg/dL      Total Protein 7.2 g/dL      Albumin 3.4 g/dL      ALT (SGPT) 9 U/L      AST (SGOT) 12 U/L      Alkaline Phosphatase 87 U/L      Total Bilirubin 0.8 mg/dL      Globulin 3.8 gm/dL      A/G Ratio 0.9 g/dL      BUN/Creatinine Ratio 22.5     Anion Gap 13.8 mmol/L      eGFR 72.0 mL/min/1.73     Narrative:      GFR Normal >60  Chronic Kidney Disease <60  Kidney Failure " <15    The GFR formula is only valid for adults with stable renal function between ages 18 and 70.    Single High Sensitivity Troponin T [[id number]]  (Normal) Collected: 06/09/24 1929    Specimen: Blood Updated: 06/09/24 1952     HS Troponin T 16 ng/L     Narrative:      High Sensitive Troponin T Reference Range:  <14.0 ng/L- Negative Female for AMI  <22.0 ng/L- Negative Male for AMI  >=14 - Abnormal Female indicating possible myocardial injury.  >=22 - Abnormal Male indicating possible myocardial injury.   Clinicians would have to utilize clinical acumen, EKG, Troponin, and serial changes to determine if it is an Acute Myocardial Infarction or myocardial injury due to an underlying chronic condition.         Magnesium [[id number]]  (Normal) Collected: 06/09/24 1929    Specimen: Blood Updated: 06/09/24 1952     Magnesium 2.3 mg/dL     CBC & Differential [[id number]]  (Abnormal) Collected: 06/09/24 1929    Specimen: Blood Updated: 06/09/24 1949    Narrative:      The following orders were created for panel order CBC & Differential.  Procedure                               Abnormality         Status                     ---------                               -----------         ------                     CBC Auto Differential[[id number]]        Abnormal            Final result               Scan Slide[[id number]]                                                                    Please view results for these tests on the individual orders.    Manual Differential [[id number]]  (Abnormal) Collected: 06/09/24 1929    Specimen: Blood Updated: 06/09/24 1949     Neutrophil % 52.0 %      Lymphocyte % 13.0 %      Monocyte % 18.0 %      Bands %  17.0 %      Neutrophils Absolute 11.16 10*3/mm3      Lymphocytes Absolute 2.10 10*3/mm3      Monocytes Absolute 2.91 10*3/mm3      RBC Morphology Normal     WBC Morphology Normal     Platelet Estimate Adequate    [place] Draw [[id number]] Collected: 06/09/24 1929    Specimen: Blood Updated:  06/09/24 1947    Narrative:      The following orders were created for panel order [place] Draw.  Procedure                               Abnormality         Status                     ---------                               -----------         ------                     Green Top (Gel)[[id number]]                                  Final result               Lavender Top[[id number]]                                     Final result               Gold Top - SST[[id number]]                                   Final result               Light Blue Top[[id number]]                                   Final result                 Please view results for these tests on the individual orders.    Green Top (Gel) [[id number]] Collected: 06/09/24 1929    Specimen: Blood Updated: 06/09/24 1947     Extra Tube Hold for add-ons.     Comment: Auto resulted.       Lavender Top [[id number]] Collected: 06/09/24 1929    Specimen: Blood Updated: 06/09/24 1947     Extra Tube hold for add-on     Comment: Auto resulted       Gold Top - SST [[id number]] Collected: 06/09/24 1929    Specimen: Blood Updated: 06/09/24 1947     Extra Tube Hold for add-ons.     Comment: Auto resulted.       Light Blue Top [[id number]] Collected: 06/09/24 1929    Specimen: Blood Updated: 06/09/24 1947     Extra Tube Hold for add-ons.     Comment: Auto resulted       CBC Auto Differential [[id number]]  (Abnormal) Collected: 06/09/24 1929    Specimen: Blood Updated: 06/1939     WBC 16.17 10*3/mm3      RBC 4.32 10*6/mm3      Hemoglobin 12.7 g/dL      Hematocrit 38.2 %      MCV 88.4 fL      MCH 29.4 pg      MCHC 33.2 g/dL      RDW 13.7 %      RDW-SD 44.6 fl      MPV 8.3 fL      Platelets 428 10*3/mm3      Neutrophil % 69.6 %      Lymphocyte % 9.3 %      Monocyte % 18.1 %      Eosinophil % 1.2 %      Basophil % 0.7 %      Immature Grans % 1.1 %      Neutrophils, Absolute 11.27 10*3/mm3      Lymphocytes, Absolute 1.51 10*3/mm3      Monocytes, Absolute 2.92 10*3/mm3       Eosinophils, Absolute 0.19 10*3/mm3      Basophils, Absolute 0.11 10*3/mm3      Immature Grans, Absolute 0.17 10*3/mm3      nRBC 0.0 /100 WBC             Imaging Results (Most Recent)       Procedure Component Value Units Date/Time    CT Abdomen Pelvis With Contrast [[id number]] Collected: 06/09/24 2052     Updated: 06/09/24 2102    Narrative:      CT ABDOMEN PELVIS W CONTRAST    Date of Exam: 6/9/2024 8:46 PM EDT    Indication: diarrhea since Oct, weakness, loss of appetite, 40# weight loss, no known med hx.    Comparison: None available.    Technique: Axial CT images were obtained of the abdomen and pelvis following the uneventful intravenous administration of iodinated contrast. Sagittal and coronal reconstructions were performed.  Automated exposure control and iterative reconstruction   methods were used.    Findings:  Lung Bases:     The lung bases are clear. There is mild coronary artery calcific atherosclerosis.    Liver:  There is mild fatty infiltration of liver. There are no focal liver lesions. There is no intrahepatic biliary ductal dilatation.    Biliary/Gallbladder:    There is a large stone in the neck of the gallbladder. There is no pericholecystic fluid or gallbladder wall thickening. The biliary tree is nondilated.    Spleen:  Spleen is normal in size and CT density.    Pancreas:    Pancreas is normal. There is no evidence of pancreatic mass or peripancreatic fluid.    Kidneys:    Kidneys are normal in size. There are no stones or hydronephrosis.    Adrenals:    Adrenal glands are unremarkable.    Retroperitoneal/Lymph Nodes/Vasculature:    No retroperitoneal adenopathy is identified.    Gastrointestinal/Mesentery:    There is an abnormal appearance of the colon. Primarily involving the ascending and transverse portions of the colon, there is wall thickening and a fluid-filled appearance of the colon with surrounding fat stranding consistent with colitis. The distal   colon also demonstrates some  dilatation or wall thickening but without intraluminal fluid. The small bowel is unremarkable with a few fluid-filled loops but without evidence of inflammation. There is no free air. The appendix is dilated and fluid-filled   but appears to be without significant inflammatory change    Bladder:    The bladder is normal.    Genital:     Unremarkable          Bony Structures:     Visualized bony structures are consistent with the patient's age.        Impression:      Impression:  Abnormal appearance of the colon. The ascending and transverse portions of the colon are fluid-filled and dilated with wall thickening and surrounding fat stranding consistent with colitis. The distal colon also demonstrates some wall thickening but   without intraluminal fluid. The appendix is dilated and fluid-filled but without significant inflammatory change.        Electronically Signed: Sigifredo Lee MD    6/9/2024 8:59 PM EDT    Workstation ID: [id number]    XR Chest 1 View [[id number]] Collected: 06/09/24 2049     Updated: 06/09/24 2053    Narrative:      XR CHEST 1 VW    Date of Exam: 6/9/2024 8:26 PM EDT    Indication: tachycardia, leukocytosis, no source    Comparison: None available.    Findings:  There are no airspace consolidations. No pleural fluid. No pneumothorax. The pulmonary vasculature appears within normal limits. The cardiac and mediastinal silhouette appear unremarkable. No acute osseous abnormality identified.      Impression:      Impression:  No active disease.        Electronically Signed: Sigifredo Lee MD    6/9/2024 8:50 PM EDT    Workstation ID: [id number]              ECG/EMG Results (most recent)       Procedure Component Value Units Date/Time    ECG 12 Lead ED Triage Standing Order; Weak / Dizzy / AMS [[id number]] Collected: 06/09/24 1925     Updated: 06/09/24 1927     QT Interval 348 ms      QTC Interval 445 ms     Narrative:      HEART RATE= 98  bpm  RR Interval= 612  ms  NC Interval=   ms  P Horizontal Axis=    deg  P Front Axis=   deg  QRSD Interval= 99  ms  QT Interval= 348  ms  QTcB= 445  ms  QRS Axis= -23  deg  T Wave Axis= 34  deg  - ABNORMAL ECG -  Atrial fibrillation  Borderline left axis deviation  Electronically Signed By:   Date and Time of Study: 2024-06-09 19:25:51              Assessment & Plan     Colitis   Suspect infectious  Start IV Flagyl and and Rocephin  IV fluids  Clear liquid diet; advance to regular diet as tolerated  Prn IV morphine and zofran  Send stool for C.diff/culture  Consult GI to evaluate       DVT ppx:  SCDs/ambulation    James Rodriguez DO  06/09/24  22:49 EDT      At [facility], we believe that sharing information builds trust and better relationships. You are receiving this note because you recently visited [facility]. It is possible you will see health information before a provider has talked with you about it. This kind of information can be easy to misunderstand. To help you fully understand what it means for your health, we urge you to discuss this note with your provider.

## 2024-06-10 NOTE — CONSULTS
Referring Provider: Dr. Rodriguez   Reason for Consultation: Colitis per CT Imaging, Unintentional Weight Loss     Patient Care Team:  Provider, No Known as PCP - General    Chief complaint weight loss, diarrhea     Subjective .     History of present illness:      85 yo M with no significant PMH presented on 6/9 with worsening chronic diarrhea and unintentional weight loss. Admitted for colitis per CT imaging. GI consulted for further evaluation.     Reports intermittent diarrhea ongoing since 10/2023 further described as at least 3 to 4 watery bowel movements per day during his bouts of diarrhea. Also reports 40 lb weight loss unintentionally over the last year. Associated symptoms including excessive gas/bloating.     Reports no previous EGD or colonoscopy. Admission CT A/P was suggestive of colitis involving the ascending and transverse colon. Infectious w/u is negative to date.    Started on empiric Rocephin and Flagyl yesterday and reports improvement in his diarrhea.     Review of Systems  Pertinent items are noted in HPI    History  History reviewed. No pertinent past medical history., History reviewed. No pertinent surgical history., History reviewed. No pertinent family history.,   Social History     Socioeconomic History    Marital status:    Tobacco Use    Smoking status: Former     Types: Cigarettes   Vaping Use    Vaping status: Never Used   Substance and Sexual Activity    Alcohol use: Not Currently    Drug use: Never     E-cigarette/Vaping    E-cigarette/Vaping Use Never User     Passive Exposure No     Counseling Given No      E-cigarette/Vaping Substances    Nicotine No     THC No     CBD No     Flavoring No      E-cigarette/Vaping Devices    Disposable No     Pre-filled or Refillable Cartridge No     Refillable Tank No     Pre-filled Pod No          ,   Medications Prior to Admission   Medication Sig Dispense Refill Last Dose    aspirin 325 MG EC tablet Take 1 tablet by mouth Daily.    Past Week    multivitamin with minerals (MULTIVITAMIN ADULT PO) Take 1 tablet by mouth Daily.   Past Week    NON FORMULARY Take 1 tablet by mouth Daily. Coconut oil tablet   Past Week    Probiotic Product (Risaquad-2) capsule capsule Take 1 capsule by mouth Daily.   Past Week   , Scheduled Meds:  cefTRIAXone, 1,000 mg, Intravenous, Q24H  metroNIDAZOLE, 500 mg, Intravenous, Q8H  simethicone, 120 mg, Oral, 4x Daily AC & at Bedtime  sodium chloride, 10 mL, Intravenous, Q12H    , Continuous Infusions:  sodium chloride, 75 mL/hr, Last Rate: 75 mL/hr (06/10/24 1246)    , PRN Meds:    Morphine **AND** naloxone    ondansetron ODT    sodium chloride    sodium chloride    sodium chloride, and Allergies:  Patient has no known allergies.    Objective     Vital Signs   Temp:  [97.5 °F (36.4 °C)-98.4 °F (36.9 °C)] 98.3 °F (36.8 °C)  Heart Rate:  [] 100  Resp:  [16-20] 20  BP: (126-163)/() 153/66    Physical Exam:   General Appearance alert, appears stated age, and cooperative  Head normocephalic, without obvious abnormality and atraumatic  Eyes lids and lashes normal and conjunctivae and sclerae normal  Lungs clear to auscultation, respirations regular, respirations even, and respirations unlabored  Heart regular rhythm & normal rate and normal S1, S2  Abdomen normal bowel sounds, no masses, and soft non-tender  Neurologic Mental Status orientated to person, place, time and situation    Results Review:   I reviewed the patient's new clinical results.  I reviewed the patient's new imaging results and agree with the interpretation.      Assessment & Plan       Colitis    Acute colitis      85 yo M with no significant PMH presented on 6/9 with worsening chronic diarrhea and unintentional weight loss. Admitted for colitis per CT imaging. GI consulted for further evaluation.     # Colitis per CT Imaging   # Chronic Diarrhea   # Unintentional Weight Loss   - No previous EGD or colonoscopy   - Infectious w/u negative to date    Plan:   - Continue empiric antibiotics as patient reports improvement of his symptoms since starting   - Declined scheduling EGD and colonoscopy as he has had friends pass away from perforations following endoscopy   - Stool culture pending. Order fecal calprotectin   - Start scheduled Mylicon for abdominal bloating     I discussed the patients findings and my recommendations with patient, family, and primary care team    Humberto Guerra MD  06/10/24  19:11 EDT

## 2024-06-10 NOTE — NURSING NOTE
Cdiff came back negative. This RN reached out to Fern SAENZ to remove him from isolation. Awaiting call back.

## 2024-06-11 LAB
ALBUMIN SERPL-MCNC: 2.8 G/DL (ref 3.5–5.2)
ALBUMIN/GLOB SERPL: 0.9 G/DL
ALP SERPL-CCNC: 87 U/L (ref 39–117)
ALT SERPL W P-5'-P-CCNC: 8 U/L (ref 1–41)
ANION GAP SERPL CALCULATED.3IONS-SCNC: 10.1 MMOL/L (ref 5–15)
AST SERPL-CCNC: 14 U/L (ref 1–40)
BASOPHILS # BLD AUTO: 0.01 10*3/MM3 (ref 0–0.2)
BASOPHILS NFR BLD AUTO: 0.1 % (ref 0–1.5)
BILIRUB SERPL-MCNC: 0.3 MG/DL (ref 0–1.2)
BUN SERPL-MCNC: 21 MG/DL (ref 8–23)
BUN/CREAT SERPL: 35.6 (ref 7–25)
CALCIUM SPEC-SCNC: 8.6 MG/DL (ref 8.6–10.5)
CHLORIDE SERPL-SCNC: 103 MMOL/L (ref 98–107)
CO2 SERPL-SCNC: 22.9 MMOL/L (ref 22–29)
CREAT SERPL-MCNC: 0.59 MG/DL (ref 0.76–1.27)
DEPRECATED RDW RBC AUTO: 46 FL (ref 37–54)
EGFRCR SERPLBLD CKD-EPI 2021: 95.1 ML/MIN/1.73
EOSINOPHIL # BLD AUTO: 0.02 10*3/MM3 (ref 0–0.4)
EOSINOPHIL NFR BLD AUTO: 0.2 % (ref 0.3–6.2)
ERYTHROCYTE [DISTWIDTH] IN BLOOD BY AUTOMATED COUNT: 13.6 % (ref 12.3–15.4)
GLOBULIN UR ELPH-MCNC: 3.2 GM/DL
GLUCOSE SERPL-MCNC: 111 MG/DL (ref 65–99)
HCT VFR BLD AUTO: 34.2 % (ref 37.5–51)
HGB BLD-MCNC: 11.1 G/DL (ref 13–17.7)
IMM GRANULOCYTES # BLD AUTO: 0.07 10*3/MM3 (ref 0–0.05)
IMM GRANULOCYTES NFR BLD AUTO: 0.7 % (ref 0–0.5)
LYMPHOCYTES # BLD AUTO: 1.4 10*3/MM3 (ref 0.7–3.1)
LYMPHOCYTES NFR BLD AUTO: 14 % (ref 19.6–45.3)
MCH RBC QN AUTO: 29.4 PG (ref 26.6–33)
MCHC RBC AUTO-ENTMCNC: 32.5 G/DL (ref 31.5–35.7)
MCV RBC AUTO: 90.5 FL (ref 79–97)
MONOCYTES # BLD AUTO: 2.46 10*3/MM3 (ref 0.1–0.9)
MONOCYTES NFR BLD AUTO: 24.5 % (ref 5–12)
NEUTROPHILS NFR BLD AUTO: 6.07 10*3/MM3 (ref 1.7–7)
NEUTROPHILS NFR BLD AUTO: 60.5 % (ref 42.7–76)
PLATELET # BLD AUTO: 402 10*3/MM3 (ref 140–450)
PMV BLD AUTO: 8.9 FL (ref 6–12)
POTASSIUM SERPL-SCNC: 3.7 MMOL/L (ref 3.5–5.2)
PROT SERPL-MCNC: 6 G/DL (ref 6–8.5)
RBC # BLD AUTO: 3.78 10*6/MM3 (ref 4.14–5.8)
SODIUM SERPL-SCNC: 136 MMOL/L (ref 136–145)
WBC NRBC COR # BLD AUTO: 10.03 10*3/MM3 (ref 3.4–10.8)

## 2024-06-11 PROCEDURE — 25010000002 CEFTRIAXONE PER 250 MG: Performed by: FAMILY MEDICINE

## 2024-06-11 PROCEDURE — 85025 COMPLETE CBC W/AUTO DIFF WBC: CPT | Performed by: FAMILY MEDICINE

## 2024-06-11 PROCEDURE — 99232 SBSQ HOSP IP/OBS MODERATE 35: CPT | Performed by: HOSPITALIST

## 2024-06-11 PROCEDURE — 99232 SBSQ HOSP IP/OBS MODERATE 35: CPT | Performed by: STUDENT IN AN ORGANIZED HEALTH CARE EDUCATION/TRAINING PROGRAM

## 2024-06-11 PROCEDURE — 25810000003 SODIUM CHLORIDE 0.9 % SOLUTION: Performed by: FAMILY MEDICINE

## 2024-06-11 PROCEDURE — 25010000002 METRONIDAZOLE 500 MG/100ML SOLUTION: Performed by: FAMILY MEDICINE

## 2024-06-11 PROCEDURE — 80053 COMPREHEN METABOLIC PANEL: CPT | Performed by: HOSPITALIST

## 2024-06-11 RX ORDER — BISACODYL 5 MG/1
20 TABLET, DELAYED RELEASE ORAL ONCE
Status: COMPLETED | OUTPATIENT
Start: 2024-06-12 | End: 2024-06-12

## 2024-06-11 RX ORDER — L.ACID,PARA/B.BIFIDUM/S.THERM 8B CELL
1 CAPSULE ORAL DAILY
Status: DISCONTINUED | OUTPATIENT
Start: 2024-06-11 | End: 2024-06-18 | Stop reason: HOSPADM

## 2024-06-11 RX ORDER — BISACODYL 5 MG/1
20 TABLET, DELAYED RELEASE ORAL ONCE
Status: COMPLETED | OUTPATIENT
Start: 2024-06-11 | End: 2024-06-11

## 2024-06-11 RX ORDER — AMOXICILLIN AND CLAVULANATE POTASSIUM 875; 125 MG/1; MG/1
1 TABLET, FILM COATED ORAL EVERY 12 HOURS SCHEDULED
Status: DISPENSED | OUTPATIENT
Start: 2024-06-11 | End: 2024-06-15

## 2024-06-11 RX ADMIN — SIMETHICONE 120 MG: 80 TABLET, CHEWABLE ORAL at 08:13

## 2024-06-11 RX ADMIN — SODIUM CHLORIDE 75 ML/HR: 9 INJECTION, SOLUTION INTRAVENOUS at 02:40

## 2024-06-11 RX ADMIN — Medication 1 CAPSULE: at 08:13

## 2024-06-11 RX ADMIN — BISACODYL 20 MG: 5 TABLET, COATED ORAL at 18:06

## 2024-06-11 RX ADMIN — POLYETHYLENE GLYCOL 3350, SODIUM SULFATE ANHYDROUS, SODIUM BICARBONATE, SODIUM CHLORIDE, POTASSIUM CHLORIDE 2000 ML: 236; 22.74; 6.74; 5.86; 2.97 POWDER, FOR SOLUTION ORAL at 18:08

## 2024-06-11 RX ADMIN — SIMETHICONE 120 MG: 80 TABLET, CHEWABLE ORAL at 20:37

## 2024-06-11 RX ADMIN — Medication 10 ML: at 08:57

## 2024-06-11 RX ADMIN — SIMETHICONE 120 MG: 80 TABLET, CHEWABLE ORAL at 12:30

## 2024-06-11 RX ADMIN — METRONIDAZOLE 500 MG: 500 INJECTION, SOLUTION INTRAVENOUS at 00:17

## 2024-06-11 RX ADMIN — AMOXICILLIN AND CLAVULANATE POTASSIUM 1 TABLET: 875; 125 TABLET, FILM COATED ORAL at 08:13

## 2024-06-11 RX ADMIN — AMOXICILLIN AND CLAVULANATE POTASSIUM 1 TABLET: 875; 125 TABLET, FILM COATED ORAL at 20:37

## 2024-06-11 RX ADMIN — CEFTRIAXONE 1000 MG: 1 INJECTION, POWDER, FOR SOLUTION INTRAMUSCULAR; INTRAVENOUS at 00:55

## 2024-06-11 RX ADMIN — Medication 10 ML: at 20:37

## 2024-06-11 RX ADMIN — SIMETHICONE 120 MG: 80 TABLET, CHEWABLE ORAL at 18:06

## 2024-06-11 NOTE — PLAN OF CARE
Goal Outcome Evaluation:  Plan of Care Reviewed With: patient        Progress: improving  Outcome Evaluation: Denies pain; large liquid stool this am; no further stools; EGD & colonoscopy tomorrow; bowel prep started; probiotic started

## 2024-06-11 NOTE — PROGRESS NOTES
Adult Nutrition  Assessment/PES    Patient Name:  Mehnaz Thayer  YOB: 1940  MRN: [id number]  Admit Date:  6/9/2024    Assessment Date:  6/11/2024    Comments:  Seen for MST score.    Pt meets criteria for:  Severe chronic disease related malnutrition related to chronic on acute diarrhea as evidenced by less 75% of est energy requirement >= 1 month, >7.5% wt loss in past 3 months, severe muscle wasting and fat loss on exam.     Recommend: add [employer] packet for additional 15 gm pro with each meal in drink of choice.    Will cont to follow and monitor.      Reason for Assessment       Row Name 06/11/24 1030          Reason for Assessment    Reason For Assessment identified at risk by screening criteria     Diagnosis gastrointestinal disease  colitis, diarrhea , wt loss     Identified At Risk by Screening Criteria MST SCORE 2+                    Nutrition/Diet History       Row Name 06/11/24 1031          Nutrition/Diet History    Typical Intake (Food/Fluid/EN/PN) Spoke w pt at bedside. Reports loss 40# since 10/2023, large part in past 6 weeks. Appetite loss & wt loss. NKFA. Missing/limited dentition but reports able to chew steak etc when well. Does like pro sources like fish, eggs, steak, peanut butter. Has been unable to eat very much at atll. Didn't tolerate oral supplement at home like boost or ensure.     Functional Status ambulatory  wife prepares meals     Factors Affecting Nutritional Intake appetite;altered gastrointestinal function                    Labs/Tests/Procedures/Meds       Row Name 06/11/24 1032          Labs/Procedures/Meds    Lab Results Reviewed reviewed     Lab Results Comments glu 130, 114, 111        Diagnostic Tests/Procedures    Diagnostic Test/Procedure Reviewed reviewed        Medications    Pertinent Medications Reviewed reviewed     Pertinent Medications Comments risaquad                    Physical Findings       Row Name 06/11/24 1033          Physical Findings     Overall Physical Appearance pt with muscle wasting and fat loss on exam                    Estimated/Assessed Needs - Anthropometrics       Row Name 06/11/24 1033          Anthropometrics    Weight for Calculation 75.6 kg (166 lb 10.7 oz)     Additional Documentation --  used to be 294#, at least 200 6-8 months ago reported        Estimated/Assessed Needs    Additional Documentation Estimated Calorie Needs (Group);Fluid Requirements (Group);Protein Requirements (Group)        Estimated Calorie Needs    Estimated Calorie Requirement (kcal/day) 0416-6523 kcal ( 30-35 kcal/kg )     Estimated Calorie Need Method kcal/kg        Protein Requirements    Est Protein Requirement Amount (gms/kg) 1.2 gm protein   gm pro (1.2-1.5 gm/kg pro)        Fluid Requirements    Estimated Fluid Requirement Method RDA Method  7308-5157 ml                    Nutrition Prescription Ordered       Row Name 06/11/24 1035          Nutrition Prescription PO    Current PO Diet Clear Liquid                    Evaluation of Received Nutrient/Fluid Intake       Row Name 06/11/24 1035          Fluid Intake Evaluation    Oral Fluid (mL) 540        PO Evaluation    % PO Intake 0. 25%                    Malnutrition Severity Assessment       Row Name 06/11/24 1035          Malnutrition Severity Assessment    Malnutrition Type Chronic Disease - Related Malnutrition        Insufficient Energy Intake     Insufficient Energy Intake  <75% of est. energy requirement for > or equal to 1 month        Unintentional Weight Loss     Unintentional Weight Loss  Weight loss greater than 7.5% in three months        Muscle Loss    Mandaeism Region Severe - deep hollowing/scooping, lack of muscle to touch, facial bones well defined     Clavicle Bone Region Severe - protruding prominent bone     Acromion Bone Region Severe - squared shoulders, bones, and acromion process protrusion prominent     Patellar Region Moderate - patella more prominent, less muscle definition  around patella     Anterior Thigh Region Moderate - mild depression on inner thigh     Posterior Calf Region Severe - thin with very little definition/firmness        Fat Loss    Orbital Region  Severe - pronounced hollowness/depression, dark circles, loose saggy skin     Upper Arm Region Moderate - some fat tissue, not ample     Thoracic & Lumbar Region Moderate - ribs visible with mild depressions, iliac crest somewhat prominent        Criteria Met (Must meet criteria for severity in at least 2 of these categories: M Wasting, Fat Loss, Fluid, Secondary Signs, Wt. Status, Intake)    Patient meets criteria for  Severe Malnutrition                     Problem/Interventions:   Problem 1       Row Name 06/11/24 1036          Nutrition Diagnoses Problem 1    Problem 1 Other (comment)  Severe chronic disease related malnutrition related to chronic on acute diarrhea as evidenced by less 75% of est energy requirement >= 1 month, >7.5% wt loss in past 3 months, severe muscle wasting and fat loss on exam.                          Intervention Goal       Row Name 06/11/24 1037          Intervention Goal    General Meet nutritional needs for age/condition     PO Establish PO;PO intake (%);Advance diet     PO Intake % 50 %     Weight Maintain weight                    Nutrition Intervention       Row Name 06/11/24 1038          Nutrition Intervention    RD/Tech Action Interview for preference;Encourage intake;Follow Tx progress                      Education/Evaluation       Row Name 06/11/24 1038          Education    Education Provided education regarding;Education topics  Edu on nutrition exam. Edu on nutrition losses due to wt loss & po intake. Edu on easy pro sources. Edu on use of oral supplement.     Education Topics Protein  Living Well Nourished provided w RD contact.        Monitor/Evaluation    Monitor Per protocol;I&O;PO intake;Supplement intake;Pertinent labs;Weight;Symptoms     Education Follow-up Other (comment)   pt verbalized understanding                     Electronically signed by:  Jil Gonzalez RD  06/11/24 10:39 EDT

## 2024-06-11 NOTE — PLAN OF CARE
Goal Outcome Evaluation:  Plan of Care Reviewed With: patient        Progress: no change  Outcome Evaluation: patient rested well this shift, no c/o pain. IVF infusing per order.

## 2024-06-11 NOTE — PROGRESS NOTES
"Hospitalist Team      Patient Care Team:  Provider, No Known as PCP - General      Chief Complaint:  Follow-up Acute Colitis    Subjective    Mr. Thayer reports he still feels weak, but a little better.  Stool frequency seems to be decreasing, but had a large accident.  Movement described as nonbloody.  He is tolerating some liquids but not much solid.  He denies chest pain and dyspnea.    Objective    Vital Signs  Temp:  [97.9 °F (36.6 °C)-98.3 °F (36.8 °C)] 98 °F (36.7 °C)  Heart Rate:  [] 98  Resp:  [18-20] 20  BP: (139-173)/(66-93) 173/93  Oxygen Therapy  SpO2: 96 %  Pulse Oximetry Type: Intermittent  Device (Oxygen Therapy): room air}    Flowsheet Rows      Flowsheet Row First Filed Value   Admission Height 180.3 cm (71\") Documented at 06/09/2024 1920   Admission Weight 70.3 kg (155 lb) Documented at 06/09/2024 1920            Physical Exam:    General: Thin, elderly appearing male in no acute distress.  Lungs: Breath sounds are clear throughout all fields.  Normal excursion.  CV: Regular rate and rhythm.  No murmurs appreciated.  Radial and pedal pulses are 2+ and symmetric.  Abdomen: Soft and nontender with active bowel sounds.  MSK: No clubbing, cyanosis, or edema.  Neuro: Cranial nerves II through XII are grossly intact.  Psych: Normal affect.    Results Review:     I reviewed the patient's new clinical results.    Lab Results (last 24 hours)       Procedure Component Value Units Date/Time    Comprehensive Metabolic Panel [[id number]]  (Abnormal) Collected: 06/11/24 0402    Specimen: Blood Updated: 06/11/24 0525     Glucose 111 mg/dL      BUN 21 mg/dL      Creatinine 0.59 mg/dL      Sodium 136 mmol/L      Potassium 3.7 mmol/L      Chloride 103 mmol/L      CO2 22.9 mmol/L      Calcium 8.6 mg/dL      Total Protein 6.0 g/dL      Albumin 2.8 g/dL      ALT (SGPT) 8 U/L      AST (SGOT) 14 U/L      Alkaline Phosphatase 87 U/L      Total Bilirubin 0.3 mg/dL      Globulin 3.2 gm/dL      A/G Ratio 0.9 g/dL      " BUN/Creatinine Ratio 35.6     Anion Gap 10.1 mmol/L      eGFR 95.1 mL/min/1.73     Narrative:      GFR Normal >60  Chronic Kidney Disease <60  Kidney Failure <15    The GFR formula is only valid for adults with stable renal function between ages 18 and 70.    CBC & Differential [[id number]]  (Abnormal) Collected: 06/11/24 0402    Specimen: Blood Updated: 06/11/24 0509    Narrative:      The following orders were created for panel order CBC & Differential.  Procedure                               Abnormality         Status                     ---------                               -----------         ------                     CBC Auto Differential[[id number]]        Abnormal            Final result               Scan Slide[[id number]]                                                                    Please view results for these tests on the individual orders.    CBC Auto Differential [[id number]]  (Abnormal) Collected: 06/11/24 0402    Specimen: Blood Updated: 06/11/24 0509     WBC 10.03 10*3/mm3      RBC 3.78 10*6/mm3      Hemoglobin 11.1 g/dL      Hematocrit 34.2 %      MCV 90.5 fL      MCH 29.4 pg      MCHC 32.5 g/dL      RDW 13.6 %      RDW-SD 46.0 fl      MPV 8.9 fL      Platelets 402 10*3/mm3      Neutrophil % 60.5 %      Lymphocyte % 14.0 %      Monocyte % 24.5 %      Eosinophil % 0.2 %      Basophil % 0.1 %      Immature Grans % 0.7 %      Neutrophils, Absolute 6.07 10*3/mm3      Lymphocytes, Absolute 1.40 10*3/mm3      Monocytes, Absolute 2.46 10*3/mm3      Eosinophils, Absolute 0.02 10*3/mm3      Basophils, Absolute 0.01 10*3/mm3      Immature Grans, Absolute 0.07 10*3/mm3     Gastrointestinal Panel, PCR - Stool, Per Rectum [[id number]]  (Normal) Collected: 06/09/24 2259    Specimen: Stool from Per Rectum Updated: 06/10/24 1158     Campylobacter Not Detected     Plesiomonas shigelloides Not Detected     Salmonella Not Detected     Vibrio Not Detected     Vibrio cholerae Not Detected     Yersinia  enterocolitica Not Detected     Enteroaggregative E. coli (EAEC) Not Detected     Enteropathogenic E. coli (EPEC) Not Detected     Enterotoxigenic E. coli (ETEC) lt/st Not Detected     Shiga-like toxin-producing E. coli (STEC) stx1/stx2 Not Detected     Shigella/Enteroinvasive E. coli (EIEC) Not Detected     Cryptosporidium Not Detected     Cyclospora cayetanensis Not Detected     Entamoeba histolytica Not Detected     Giardia lamblia Not Detected     Adenovirus F40/41 Not Detected     Astrovirus Not Detected     Norovirus GI/GII Not Detected     Rotavirus A Not Detected     Sapovirus (I, II, IV or V) Not Detected    Narrative:      If Aeromonas, Staphylococcus aureus or Bacillus cereus are suspected, please order [id number]: Stool Culture, Aeromonas, S aureus, B Cereus.    Calprotectin, Fecal - Stool, Per Rectum [[id number]] Collected: 06/10/24 1149    Specimen: Stool from Per Rectum Updated: 06/10/24 1153            Imaging Results (Last 24 Hours)       ** No results found for the last 24 hours. **              Medication Review:   I have reviewed the patient's current medication list    Current Facility-Administered Medications:     amoxicillin-clavulanate (AUGMENTIN) 875-125 MG per tablet 1 tablet, 1 tablet, Oral, Q12H, Darren Marinelli MD, 1 tablet at 06/11/24 0813    lactobacillus acidophilus (RISAQUAD) capsule 1 capsule, 1 capsule, Oral, Daily, Darren Marinelli MD, 1 capsule at 06/11/24 0813    morphine injection 2 mg, 2 mg, Intravenous, Q4H PRN **AND** naloxone (NARCAN) injection 0.4 mg, 0.4 mg, Intravenous, Q5 Min PRN, James Rodriguez DO    ondansetron ODT (ZOFRAN-ODT) disintegrating tablet 4 mg, 4 mg, Oral, Q6H PRN, James Rodriguez DO, 4 mg at 06/10/24 0956    simethicone (MYLICON) chewable tablet 120 mg, 120 mg, Oral, 4x Daily AC & at Bedtime, Humberto Guerra MD, 120 mg at 06/11/24 0813    sodium chloride 0.9 % flush 10 mL, 10 mL, Intravenous, PRN, Charles Mehta MD    sodium chloride 0.9 % flush 10 mL, 10  mL, Intravenous, Q12H, James Rodriguez DO, 10 mL at 06/10/24 2119    sodium chloride 0.9 % flush 10 mL, 10 mL, Intravenous, PRN, James Rodriguez DO    sodium chloride 0.9 % infusion 40 mL, 40 mL, Intravenous, PRN, James Rodriguez DO    sodium chloride 0.9 % infusion, 75 mL/hr, Intravenous, Continuous, James Rodriguez DO, Last Rate: 75 mL/hr at 06/11/24 0240, 75 mL/hr at 06/11/24 0240      Assessment & Plan     Acute colitis: Organism not isolated thus far.  C. difficile is negative.  Continue Rocephin and Flagyl.  Await stool culture.  Continue gentle IV fluids until full p.o. assured.  Severe Malnutrition: Dietician following.    Plan for disposition: Predicated on hospital course.    Darren Marinelli MD  06/11/24  08:48 EDT

## 2024-06-11 NOTE — PROGRESS NOTES
GI Daily Progress Note       LOS: 1 day       Subjective:    - Endorses persistent diarrhea and anorexia.   - Is now requesting to proceed with the EGD and colonoscopy given his persistent symptoms.     Objective:    Vital signs in last 24 hours:  Temp:  [97.3 °F (36.3 °C)-98 °F (36.7 °C)] 97.3 °F (36.3 °C)  Heart Rate:  [] 85  Resp:  [18-20] 18  BP: (146-173)/(75-98) 148/75    Intake/Output last 3 shifts:  I/O last 3 completed shifts:  In: 2800 [P.O.:540; I.V.:960; IV Piggyback:1300]  Out: 450 [Urine:450]  Intake/Output this shift:  No intake/output data recorded.    Physical Exam:Abdomen  Sounds Normal Active Bowel Sounds   Distension Soft   Tenderness Nontender     Imaging Results (Last 72 Hours)       Procedure Component Value Units Date/Time    CT Abdomen Pelvis With Contrast [[id number]] Collected: 06/09/24 2052     Updated: 06/09/24 2102    Narrative:      CT ABDOMEN PELVIS W CONTRAST    Date of Exam: 6/9/2024 8:46 PM EDT    Indication: diarrhea since Oct, weakness, loss of appetite, 40# weight loss, no known med hx.    Comparison: None available.    Technique: Axial CT images were obtained of the abdomen and pelvis following the uneventful intravenous administration of iodinated contrast. Sagittal and coronal reconstructions were performed.  Automated exposure control and iterative reconstruction   methods were used.    Findings:  Lung Bases:     The lung bases are clear. There is mild coronary artery calcific atherosclerosis.    Liver:  There is mild fatty infiltration of liver. There are no focal liver lesions. There is no intrahepatic biliary ductal dilatation.    Biliary/Gallbladder:    There is a large stone in the neck of the gallbladder. There is no pericholecystic fluid or gallbladder wall thickening. The biliary tree is nondilated.    Spleen:  Spleen is normal in size and CT density.    Pancreas:    Pancreas is normal. There is no evidence of pancreatic mass or peripancreatic fluid.    Kidneys:     Kidneys are normal in size. There are no stones or hydronephrosis.    Adrenals:    Adrenal glands are unremarkable.    Retroperitoneal/Lymph Nodes/Vasculature:    No retroperitoneal adenopathy is identified.    Gastrointestinal/Mesentery:    There is an abnormal appearance of the colon. Primarily involving the ascending and transverse portions of the colon, there is wall thickening and a fluid-filled appearance of the colon with surrounding fat stranding consistent with colitis. The distal   colon also demonstrates some dilatation or wall thickening but without intraluminal fluid. The small bowel is unremarkable with a few fluid-filled loops but without evidence of inflammation. There is no free air. The appendix is dilated and fluid-filled   but appears to be without significant inflammatory change    Bladder:    The bladder is normal.    Genital:     Unremarkable          Bony Structures:     Visualized bony structures are consistent with the patient's age.        Impression:      Impression:  Abnormal appearance of the colon. The ascending and transverse portions of the colon are fluid-filled and dilated with wall thickening and surrounding fat stranding consistent with colitis. The distal colon also demonstrates some wall thickening but   without intraluminal fluid. The appendix is dilated and fluid-filled but without significant inflammatory change.        Electronically Signed: Sigifredo Lee MD    6/9/2024 8:59 PM EDT    Workstation ID: [id number]    XR Chest 1 View [[id number]] Collected: 06/09/24 2049     Updated: 06/09/24 2053    Narrative:      XR CHEST 1 VW    Date of Exam: 6/9/2024 8:26 PM EDT    Indication: tachycardia, leukocytosis, no source    Comparison: None available.    Findings:  There are no airspace consolidations. No pleural fluid. No pneumothorax. The pulmonary vasculature appears within normal limits. The cardiac and mediastinal silhouette appear unremarkable. No acute osseous abnormality  identified.      Impression:      Impression:  No active disease.        Electronically Signed: Sigifredo Lee MD    6/9/2024 8:50 PM EDT    Workstation ID: [id number]            WBC   Date Value Ref Range Status   06/11/2024 10.03 3.40 - 10.80 10*3/mm3 Final     RBC   Date Value Ref Range Status   06/11/2024 3.78 (L) 4.14 - 5.80 10*6/mm3 Final     Hemoglobin   Date Value Ref Range Status   06/11/2024 11.1 (L) 13.0 - 17.7 g/dL Final     Hematocrit   Date Value Ref Range Status   06/11/2024 34.2 (L) 37.5 - 51.0 % Final     MCV   Date Value Ref Range Status   06/11/2024 90.5 79.0 - 97.0 fL Final     MCH   Date Value Ref Range Status   06/11/2024 29.4 26.6 - 33.0 pg Final     MCHC   Date Value Ref Range Status   06/11/2024 32.5 31.5 - 35.7 g/dL Final     RDW   Date Value Ref Range Status   06/11/2024 13.6 12.3 - 15.4 % Final     RDW-SD   Date Value Ref Range Status   06/11/2024 46.0 37.0 - 54.0 fl Final     MPV   Date Value Ref Range Status   06/11/2024 8.9 6.0 - 12.0 fL Final     Platelets   Date Value Ref Range Status   06/11/2024 402 140 - 450 10*3/mm3 Final     Neutrophil %   Date Value Ref Range Status   06/11/2024 60.5 42.7 - 76.0 % Final     Lymphocyte %   Date Value Ref Range Status   06/11/2024 14.0 (L) 19.6 - 45.3 % Final     Monocyte %   Date Value Ref Range Status   06/11/2024 24.5 (H) 5.0 - 12.0 % Final     Eosinophil %   Date Value Ref Range Status   06/11/2024 0.2 (L) 0.3 - 6.2 % Final     Basophil %   Date Value Ref Range Status   06/11/2024 0.1 0.0 - 1.5 % Final     Immature Grans %   Date Value Ref Range Status   06/11/2024 0.7 (H) 0.0 - 0.5 % Final     Neutrophils, Absolute   Date Value Ref Range Status   06/11/2024 6.07 1.70 - 7.00 10*3/mm3 Final     Lymphocytes, Absolute   Date Value Ref Range Status   06/11/2024 1.40 0.70 - 3.10 10*3/mm3 Final     Monocytes, Absolute   Date Value Ref Range Status   06/11/2024 2.46 (H) 0.10 - 0.90 10*3/mm3 Final     Eosinophils, Absolute   Date Value Ref Range Status    06/11/2024 0.02 0.00 - 0.40 10*3/mm3 Final     Basophils, Absolute   Date Value Ref Range Status   06/11/2024 0.01 0.00 - 0.20 10*3/mm3 Final     Immature Grans, Absolute   Date Value Ref Range Status   06/11/2024 0.07 (H) 0.00 - 0.05 10*3/mm3 Final     nRBC   Date Value Ref Range Status   06/10/2024 0.0 0.0 - 0.2 /100 WBC Final       Glucose   Date Value Ref Range Status   06/11/2024 111 (H) 65 - 99 mg/dL Final     Sodium   Date Value Ref Range Status   06/11/2024 136 136 - 145 mmol/L Final     Potassium   Date Value Ref Range Status   06/11/2024 3.7 3.5 - 5.2 mmol/L Final     CO2   Date Value Ref Range Status   06/11/2024 22.9 22.0 - 29.0 mmol/L Final     Chloride   Date Value Ref Range Status   06/11/2024 103 98 - 107 mmol/L Final     Anion Gap   Date Value Ref Range Status   06/11/2024 10.1 5.0 - 15.0 mmol/L Final     Creatinine   Date Value Ref Range Status   06/11/2024 0.59 (L) 0.76 - 1.27 mg/dL Final     BUN   Date Value Ref Range Status   06/11/2024 21 8 - 23 mg/dL Final     BUN/Creatinine Ratio   Date Value Ref Range Status   06/11/2024 35.6 (H) 7.0 - 25.0 Final     Calcium   Date Value Ref Range Status   06/11/2024 8.6 8.6 - 10.5 mg/dL Final     Alkaline Phosphatase   Date Value Ref Range Status   06/11/2024 87 39 - 117 U/L Final     Total Protein   Date Value Ref Range Status   06/11/2024 6.0 6.0 - 8.5 g/dL Final     ALT (SGPT)   Date Value Ref Range Status   06/11/2024 8 1 - 41 U/L Final     AST (SGOT)   Date Value Ref Range Status   06/11/2024 14 1 - 40 U/L Final     Total Bilirubin   Date Value Ref Range Status   06/11/2024 0.3 0.0 - 1.2 mg/dL Final     Albumin   Date Value Ref Range Status   06/11/2024 2.8 (L) 3.5 - 5.2 g/dL Final     Globulin   Date Value Ref Range Status   06/11/2024 3.2 gm/dL Final       Problem List:      Colitis    Acute colitis      Assessment and Plan     85 yo M with no significant PMH presented on 6/9 with worsening chronic diarrhea and unintentional weight loss. Admitted  for colitis per CT imaging. GI consulted for further evaluation.      # Colitis per CT Imaging   # Chronic Diarrhea   # Unintentional Weight Loss   - No previous EGD or colonoscopy   - Infectious w/u negative to date   Plan:   - Continue empiric antibiotics   - Now wishes to schedule double given persistent symptoms. Tentatively will schedule EGD and colonoscopy on 6/12 with Dr. Orlando if his schedule permits.   - Bowel prep ordered   - Stool culture and fecal calprotectin pending.   - Continue scheduled Mylicon for abdominal bloating

## 2024-06-12 PROCEDURE — 99232 SBSQ HOSP IP/OBS MODERATE 35: CPT | Performed by: HOSPITALIST

## 2024-06-12 PROCEDURE — 25810000003 SODIUM CHLORIDE 0.9 % SOLUTION: Performed by: FAMILY MEDICINE

## 2024-06-12 PROCEDURE — 99232 SBSQ HOSP IP/OBS MODERATE 35: CPT | Performed by: INTERNAL MEDICINE

## 2024-06-12 RX ORDER — VALSARTAN 80 MG/1
40 TABLET ORAL
Status: DISCONTINUED | OUTPATIENT
Start: 2024-06-12 | End: 2024-06-18 | Stop reason: HOSPADM

## 2024-06-12 RX ORDER — MAGNESIUM CARB/ALUMINUM HYDROX 105-160MG
296 TABLET,CHEWABLE ORAL ONCE
Status: COMPLETED | OUTPATIENT
Start: 2024-06-13 | End: 2024-06-13

## 2024-06-12 RX ORDER — POLYETHYLENE GLYCOL 3350 17 G/17G
119 POWDER, FOR SOLUTION ORAL ONCE
Status: COMPLETED | OUTPATIENT
Start: 2024-06-12 | End: 2024-06-12

## 2024-06-12 RX ADMIN — SIMETHICONE 120 MG: 80 TABLET, CHEWABLE ORAL at 13:30

## 2024-06-12 RX ADMIN — SODIUM CHLORIDE 75 ML/HR: 9 INJECTION, SOLUTION INTRAVENOUS at 00:20

## 2024-06-12 RX ADMIN — SIMETHICONE 120 MG: 80 TABLET, CHEWABLE ORAL at 06:30

## 2024-06-12 RX ADMIN — SIMETHICONE 120 MG: 80 TABLET, CHEWABLE ORAL at 20:41

## 2024-06-12 RX ADMIN — POLYETHYLENE GLYCOL 3350 119 G: 17 POWDER, FOR SOLUTION ORAL at 17:21

## 2024-06-12 RX ADMIN — AMOXICILLIN AND CLAVULANATE POTASSIUM 1 TABLET: 875; 125 TABLET, FILM COATED ORAL at 08:10

## 2024-06-12 RX ADMIN — VALSARTAN 40 MG: 80 TABLET ORAL at 20:41

## 2024-06-12 RX ADMIN — AMOXICILLIN AND CLAVULANATE POTASSIUM 1 TABLET: 875; 125 TABLET, FILM COATED ORAL at 20:41

## 2024-06-12 RX ADMIN — Medication 10 ML: at 20:41

## 2024-06-12 RX ADMIN — Medication 10 ML: at 08:10

## 2024-06-12 RX ADMIN — SIMETHICONE 120 MG: 80 TABLET, CHEWABLE ORAL at 17:21

## 2024-06-12 RX ADMIN — BISACODYL 20 MG: 5 TABLET, COATED ORAL at 05:19

## 2024-06-12 RX ADMIN — Medication 1 CAPSULE: at 08:10

## 2024-06-12 NOTE — PROGRESS NOTES
GI Daily Progress Note    Assessment/Plan:      Colitis    Acute colitis       LOS: 2 days     Mehnaz Thayer is a 84 y.o. male who was admitted with Colitis. He reports the symptoms are unchanged. No vomiting and is still moving his bowels    Discussed with Pt and Wife additional prep and performing both EGD and Colon tomorrow    Subjective:    Patient expresses diarrhea  Patient denies vomiting and bloody stools    Objective:    Vital signs in last 24 hours:  Temp:  [96.9 °F (36.1 °C)-98.2 °F (36.8 °C)] 96.9 °F (36.1 °C)  Heart Rate:  [] 89  Resp:  [17-20] 17  BP: (141-188)/(68-94) 147/75    Intake/Output last 3 shifts:  I/O last 3 completed shifts:  In: 2960 [P.O.:1800; I.V.:960; IV Piggyback:200]  Out: -   Intake/Output this shift:  I/O this shift:  In: 360 [P.O.:360]  Out: -     Physical Exam:Abdomen  Sounds Normal Active Bowel Sounds   Distension Soft   Tenderness Nontender     Imaging Results (Last 72 Hours)       Procedure Component Value Units Date/Time    CT Abdomen Pelvis With Contrast [[id number]] Collected: 06/09/24 2052     Updated: 06/09/24 2102    Narrative:      CT ABDOMEN PELVIS W CONTRAST    Date of Exam: 6/9/2024 8:46 PM EDT    Indication: diarrhea since Oct, weakness, loss of appetite, 40# weight loss, no known med hx.    Comparison: None available.    Technique: Axial CT images were obtained of the abdomen and pelvis following the uneventful intravenous administration of iodinated contrast. Sagittal and coronal reconstructions were performed.  Automated exposure control and iterative reconstruction   methods were used.    Findings:  Lung Bases:     The lung bases are clear. There is mild coronary artery calcific atherosclerosis.    Liver:  There is mild fatty infiltration of liver. There are no focal liver lesions. There is no intrahepatic biliary ductal dilatation.    Biliary/Gallbladder:    There is a large stone in the neck of the gallbladder. There is no pericholecystic fluid or  gallbladder wall thickening. The biliary tree is nondilated.    Spleen:  Spleen is normal in size and CT density.    Pancreas:    Pancreas is normal. There is no evidence of pancreatic mass or peripancreatic fluid.    Kidneys:    Kidneys are normal in size. There are no stones or hydronephrosis.    Adrenals:    Adrenal glands are unremarkable.    Retroperitoneal/Lymph Nodes/Vasculature:    No retroperitoneal adenopathy is identified.    Gastrointestinal/Mesentery:    There is an abnormal appearance of the colon. Primarily involving the ascending and transverse portions of the colon, there is wall thickening and a fluid-filled appearance of the colon with surrounding fat stranding consistent with colitis. The distal   colon also demonstrates some dilatation or wall thickening but without intraluminal fluid. The small bowel is unremarkable with a few fluid-filled loops but without evidence of inflammation. There is no free air. The appendix is dilated and fluid-filled   but appears to be without significant inflammatory change    Bladder:    The bladder is normal.    Genital:     Unremarkable          Bony Structures:     Visualized bony structures are consistent with the patient's age.        Impression:      Impression:  Abnormal appearance of the colon. The ascending and transverse portions of the colon are fluid-filled and dilated with wall thickening and surrounding fat stranding consistent with colitis. The distal colon also demonstrates some wall thickening but   without intraluminal fluid. The appendix is dilated and fluid-filled but without significant inflammatory change.        Electronically Signed: Sigifredo Lee MD    6/9/2024 8:59 PM EDT    Workstation ID: [id number]    XR Chest 1 View [[id number]] Collected: 06/09/24 2049     Updated: 06/09/24 2053    Narrative:      XR CHEST 1 VW    Date of Exam: 6/9/2024 8:26 PM EDT    Indication: tachycardia, leukocytosis, no source    Comparison: None  available.    Findings:  There are no airspace consolidations. No pleural fluid. No pneumothorax. The pulmonary vasculature appears within normal limits. The cardiac and mediastinal silhouette appear unremarkable. No acute osseous abnormality identified.      Impression:      Impression:  No active disease.        Electronically Signed: Sigifredo Lee MD    6/9/2024 8:50 PM EDT    Workstation ID: [id number]            WBC   Date Value Ref Range Status   06/11/2024 10.03 3.40 - 10.80 10*3/mm3 Final     RBC   Date Value Ref Range Status   06/11/2024 3.78 (L) 4.14 - 5.80 10*6/mm3 Final     Hemoglobin   Date Value Ref Range Status   06/11/2024 11.1 (L) 13.0 - 17.7 g/dL Final     Hematocrit   Date Value Ref Range Status   06/11/2024 34.2 (L) 37.5 - 51.0 % Final     MCV   Date Value Ref Range Status   06/11/2024 90.5 79.0 - 97.0 fL Final     MCH   Date Value Ref Range Status   06/11/2024 29.4 26.6 - 33.0 pg Final     MCHC   Date Value Ref Range Status   06/11/2024 32.5 31.5 - 35.7 g/dL Final     RDW   Date Value Ref Range Status   06/11/2024 13.6 12.3 - 15.4 % Final     RDW-SD   Date Value Ref Range Status   06/11/2024 46.0 37.0 - 54.0 fl Final     MPV   Date Value Ref Range Status   06/11/2024 8.9 6.0 - 12.0 fL Final     Platelets   Date Value Ref Range Status   06/11/2024 402 140 - 450 10*3/mm3 Final     Neutrophil %   Date Value Ref Range Status   06/11/2024 60.5 42.7 - 76.0 % Final     Lymphocyte %   Date Value Ref Range Status   06/11/2024 14.0 (L) 19.6 - 45.3 % Final     Monocyte %   Date Value Ref Range Status   06/11/2024 24.5 (H) 5.0 - 12.0 % Final     Eosinophil %   Date Value Ref Range Status   06/11/2024 0.2 (L) 0.3 - 6.2 % Final     Basophil %   Date Value Ref Range Status   06/11/2024 0.1 0.0 - 1.5 % Final     Immature Grans %   Date Value Ref Range Status   06/11/2024 0.7 (H) 0.0 - 0.5 % Final     Neutrophils, Absolute   Date Value Ref Range Status   06/11/2024 6.07 1.70 - 7.00 10*3/mm3 Final     Lymphocytes,  Absolute   Date Value Ref Range Status   06/11/2024 1.40 0.70 - 3.10 10*3/mm3 Final     Monocytes, Absolute   Date Value Ref Range Status   06/11/2024 2.46 (H) 0.10 - 0.90 10*3/mm3 Final     Eosinophils, Absolute   Date Value Ref Range Status   06/11/2024 0.02 0.00 - 0.40 10*3/mm3 Final     Basophils, Absolute   Date Value Ref Range Status   06/11/2024 0.01 0.00 - 0.20 10*3/mm3 Final     Immature Grans, Absolute   Date Value Ref Range Status   06/11/2024 0.07 (H) 0.00 - 0.05 10*3/mm3 Final     nRBC   Date Value Ref Range Status   06/10/2024 0.0 0.0 - 0.2 /100 WBC Final       Glucose   Date Value Ref Range Status   06/11/2024 111 (H) 65 - 99 mg/dL Final     Sodium   Date Value Ref Range Status   06/11/2024 136 136 - 145 mmol/L Final     Potassium   Date Value Ref Range Status   06/11/2024 3.7 3.5 - 5.2 mmol/L Final     CO2   Date Value Ref Range Status   06/11/2024 22.9 22.0 - 29.0 mmol/L Final     Chloride   Date Value Ref Range Status   06/11/2024 103 98 - 107 mmol/L Final     Anion Gap   Date Value Ref Range Status   06/11/2024 10.1 5.0 - 15.0 mmol/L Final     Creatinine   Date Value Ref Range Status   06/11/2024 0.59 (L) 0.76 - 1.27 mg/dL Final     BUN   Date Value Ref Range Status   06/11/2024 21 8 - 23 mg/dL Final     BUN/Creatinine Ratio   Date Value Ref Range Status   06/11/2024 35.6 (H) 7.0 - 25.0 Final     Calcium   Date Value Ref Range Status   06/11/2024 8.6 8.6 - 10.5 mg/dL Final     Alkaline Phosphatase   Date Value Ref Range Status   06/11/2024 87 39 - 117 U/L Final     Total Protein   Date Value Ref Range Status   06/11/2024 6.0 6.0 - 8.5 g/dL Final     ALT (SGPT)   Date Value Ref Range Status   06/11/2024 8 1 - 41 U/L Final     AST (SGOT)   Date Value Ref Range Status   06/11/2024 14 1 - 40 U/L Final     Total Bilirubin   Date Value Ref Range Status   06/11/2024 0.3 0.0 - 1.2 mg/dL Final     Albumin   Date Value Ref Range Status   06/11/2024 2.8 (L) 3.5 - 5.2 g/dL Final     Globulin   Date Value Ref  Range Status   06/11/2024 3.2 gm/dL Final     # Colitis per CT Imaging   # Chronic Diarrhea   # Unintentional Weight Loss   - No previous EGD or colonoscopy   - Infectious w/u negative to date   Plan:   - Continue empiric antibiotics   - Now wishes to schedule double given persistent symptoms. Tentatively will schedule EGD and colonoscopy on 6/12 with Dr. Orlando if his schedule permits.   - Bowel prep ordered   - Stool culture and fecal calprotectin pending.   - Continue scheduled Mylicon for abdominal bloating     Additional Miralax and Magnesium citrate to enhance his Prep for tomorrow and looking for 1330 for his Exams

## 2024-06-12 NOTE — PLAN OF CARE
Goal Outcome Evaluation:  Plan of Care Reviewed With: patient        Progress: no change  Outcome Evaluation: Oxygen remains on room air. No complaints of pain, nausea, or SOA so far this shift. Multiple BM overnight. IVF stopped this a.m.  Patient appears to have rested well overnight.

## 2024-06-12 NOTE — PROGRESS NOTES
"Hospitalist Team      Patient Care Team:  Provider, No Known as PCP - General        Chief Complaint:  Follow-up Colitis    Subjective    Bowel movements are still frequent, but non-bloody.  Mr. Darby still describes poor appetite although he is tolerating some liquids.  He denies chest pain and dyspnea.    Objective    Vital Signs  Temp:  [97.3 °F (36.3 °C)-98.3 °F (36.8 °C)] 98.2 °F (36.8 °C)  Heart Rate:  [] 91  Resp:  [18-20] 20  BP: (148-188)/(70-94) 170/79  Oxygen Therapy  SpO2: 97 %  Pulse Oximetry Type: Intermittent  Device (Oxygen Therapy): room air}    Flowsheet Rows      Flowsheet Row First Filed Value   Admission Height 180.3 cm (71\") Documented at 06/09/2024 1920   Admission Weight 70.3 kg (155 lb) Documented at 06/09/2024 1920            Physical Exam:    General: Frail, elderly appearing gentleman in no acute distress.  Lungs: Breath sounds are clear.  Respirations are non-labored.  CV: Irregular rate and rhythm.  No murmurs appreciated.  Radial and pedal pulses are 2+ and symmetric.  Abdomen: Soft and non-tender w/ active bowel sounds.  MSK: No C/C/E.  Neuro: CN II-XII grossly intact.  Psych: Normal affect.  Ox3.    Results Review:     I reviewed the patient's new clinical results.    Lab Results (last 24 hours)       ** No results found for the last 24 hours. **            Imaging Results (Last 24 Hours)       ** No results found for the last 24 hours. **            Medication Review:   I have reviewed the patient's current medication list    Current Facility-Administered Medications:     amoxicillin-clavulanate (AUGMENTIN) 875-125 MG per tablet 1 tablet, 1 tablet, Oral, Q12H, Darren Marinelli MD, 1 tablet at 06/12/24 0810    lactobacillus acidophilus (RISAQUAD) capsule 1 capsule, 1 capsule, Oral, Daily, Darren Marinelli MD, 1 capsule at 06/12/24 0810    morphine injection 2 mg, 2 mg, Intravenous, Q4H PRN **AND** naloxone (NARCAN) injection 0.4 mg, 0.4 mg, Intravenous, Q5 Min PRN, Michael" DO James    ondansetron ODT (ZOFRAN-ODT) disintegrating tablet 4 mg, 4 mg, Oral, Q6H PRN, James Rodriguez DO, 4 mg at 06/10/24 0956    simethicone (MYLICON) chewable tablet 120 mg, 120 mg, Oral, 4x Daily AC & at Bedtime, Humberto Guerra MD, 120 mg at 06/12/24 0630    sodium chloride 0.9 % flush 10 mL, 10 mL, Intravenous, PRN, Charles Mehta MD    sodium chloride 0.9 % flush 10 mL, 10 mL, Intravenous, Q12H, James Rodriguez DO, 10 mL at 06/12/24 0810    sodium chloride 0.9 % flush 10 mL, 10 mL, Intravenous, PRN, James Rodriguez DO    sodium chloride 0.9 % infusion 40 mL, 40 mL, Intravenous, PRN, James Rodriguez DO      Assessment & Plan     Acute Colitis: Plan for endoscopy today.  Stool culture remains pending.  Severe Malnutrition: Plan for endoscopy today.  Dietician following.    Plan for disposition: Predicated on hospital course.    Darren Marinelli MD  06/12/24  09:14 EDT

## 2024-06-13 ENCOUNTER — ANESTHESIA (OUTPATIENT)
Dept: PERIOP | Facility: HOSPITAL | Age: 84
End: 2024-06-13
Payer: MEDICARE

## 2024-06-13 ENCOUNTER — ANESTHESIA EVENT (OUTPATIENT)
Dept: PERIOP | Facility: HOSPITAL | Age: 84
End: 2024-06-13
Payer: MEDICARE

## 2024-06-13 PROBLEM — R63.4 UNINTENTIONAL WEIGHT LOSS: Status: ACTIVE | Noted: 2024-06-09

## 2024-06-13 PROBLEM — K52.9 CHRONIC DIARRHEA: Status: ACTIVE | Noted: 2024-06-09

## 2024-06-13 PROCEDURE — 43239 EGD BIOPSY SINGLE/MULTIPLE: CPT | Performed by: INTERNAL MEDICINE

## 2024-06-13 PROCEDURE — 25810000003 LACTATED RINGERS PER 1000 ML

## 2024-06-13 PROCEDURE — 0DBN8ZX EXCISION OF SIGMOID COLON, VIA NATURAL OR ARTIFICIAL OPENING ENDOSCOPIC, DIAGNOSTIC: ICD-10-PCS | Performed by: INTERNAL MEDICINE

## 2024-06-13 PROCEDURE — 88312 SPECIAL STAINS GROUP 1: CPT | Performed by: INTERNAL MEDICINE

## 2024-06-13 PROCEDURE — 99232 SBSQ HOSP IP/OBS MODERATE 35: CPT | Performed by: HOSPITALIST

## 2024-06-13 PROCEDURE — 0DB78ZX EXCISION OF STOMACH, PYLORUS, VIA NATURAL OR ARTIFICIAL OPENING ENDOSCOPIC, DIAGNOSTIC: ICD-10-PCS | Performed by: INTERNAL MEDICINE

## 2024-06-13 PROCEDURE — 25010000002 PROPOFOL 10 MG/ML EMULSION

## 2024-06-13 PROCEDURE — 88342 IMHCHEM/IMCYTCHM 1ST ANTB: CPT | Performed by: INTERNAL MEDICINE

## 2024-06-13 PROCEDURE — 0DB38ZX EXCISION OF LOWER ESOPHAGUS, VIA NATURAL OR ARTIFICIAL OPENING ENDOSCOPIC, DIAGNOSTIC: ICD-10-PCS | Performed by: INTERNAL MEDICINE

## 2024-06-13 PROCEDURE — 88305 TISSUE EXAM BY PATHOLOGIST: CPT | Performed by: INTERNAL MEDICINE

## 2024-06-13 PROCEDURE — 0DBP8ZX EXCISION OF RECTUM, VIA NATURAL OR ARTIFICIAL OPENING ENDOSCOPIC, DIAGNOSTIC: ICD-10-PCS | Performed by: INTERNAL MEDICINE

## 2024-06-13 PROCEDURE — 88341 IMHCHEM/IMCYTCHM EA ADD ANTB: CPT | Performed by: INTERNAL MEDICINE

## 2024-06-13 PROCEDURE — 0DBK8ZX EXCISION OF ASCENDING COLON, VIA NATURAL OR ARTIFICIAL OPENING ENDOSCOPIC, DIAGNOSTIC: ICD-10-PCS | Performed by: INTERNAL MEDICINE

## 2024-06-13 PROCEDURE — 25010000002 METHYLPREDNISOLONE PER 125 MG: Performed by: INTERNAL MEDICINE

## 2024-06-13 PROCEDURE — 0DB98ZX EXCISION OF DUODENUM, VIA NATURAL OR ARTIFICIAL OPENING ENDOSCOPIC, DIAGNOSTIC: ICD-10-PCS | Performed by: INTERNAL MEDICINE

## 2024-06-13 PROCEDURE — 45380 COLONOSCOPY AND BIOPSY: CPT | Performed by: INTERNAL MEDICINE

## 2024-06-13 RX ORDER — UREA 10 %
5 LOTION (ML) TOPICAL NIGHTLY PRN
Status: DISCONTINUED | OUTPATIENT
Start: 2024-06-13 | End: 2024-06-18 | Stop reason: HOSPADM

## 2024-06-13 RX ORDER — SODIUM CHLORIDE, SODIUM LACTATE, POTASSIUM CHLORIDE, CALCIUM CHLORIDE 600; 310; 30; 20 MG/100ML; MG/100ML; MG/100ML; MG/100ML
100 INJECTION, SOLUTION INTRAVENOUS ONCE
Status: DISCONTINUED | OUTPATIENT
Start: 2024-06-13 | End: 2024-06-13 | Stop reason: HOSPADM

## 2024-06-13 RX ORDER — METHYLPREDNISOLONE SODIUM SUCCINATE 125 MG/2ML
60 INJECTION, POWDER, LYOPHILIZED, FOR SOLUTION INTRAMUSCULAR; INTRAVENOUS EVERY 8 HOURS
Status: DISCONTINUED | OUTPATIENT
Start: 2024-06-13 | End: 2024-06-15

## 2024-06-13 RX ORDER — PROPOFOL 10 MG/ML
VIAL (ML) INTRAVENOUS AS NEEDED
Status: DISCONTINUED | OUTPATIENT
Start: 2024-06-13 | End: 2024-06-13 | Stop reason: SURG

## 2024-06-13 RX ORDER — PANTOPRAZOLE SODIUM 40 MG/10ML
40 INJECTION, POWDER, LYOPHILIZED, FOR SOLUTION INTRAVENOUS EVERY 12 HOURS SCHEDULED
Status: COMPLETED | OUTPATIENT
Start: 2024-06-13 | End: 2024-06-15

## 2024-06-13 RX ORDER — ONDANSETRON 2 MG/ML
4 INJECTION INTRAMUSCULAR; INTRAVENOUS ONCE AS NEEDED
Status: DISCONTINUED | OUTPATIENT
Start: 2024-06-13 | End: 2024-06-13 | Stop reason: HOSPADM

## 2024-06-13 RX ORDER — SODIUM CHLORIDE, SODIUM LACTATE, POTASSIUM CHLORIDE, CALCIUM CHLORIDE 600; 310; 30; 20 MG/100ML; MG/100ML; MG/100ML; MG/100ML
INJECTION, SOLUTION INTRAVENOUS CONTINUOUS PRN
Status: DISCONTINUED | OUTPATIENT
Start: 2024-06-13 | End: 2024-06-13 | Stop reason: SURG

## 2024-06-13 RX ADMIN — SIMETHICONE 120 MG: 80 TABLET, CHEWABLE ORAL at 06:30

## 2024-06-13 RX ADMIN — Medication 10 ML: at 09:26

## 2024-06-13 RX ADMIN — PROPOFOL 20 MG: 10 INJECTION, EMULSION INTRAVENOUS at 13:53

## 2024-06-13 RX ADMIN — AMOXICILLIN AND CLAVULANATE POTASSIUM 1 TABLET: 875; 125 TABLET, FILM COATED ORAL at 20:00

## 2024-06-13 RX ADMIN — PROPOFOL 30 MG: 10 INJECTION, EMULSION INTRAVENOUS at 13:37

## 2024-06-13 RX ADMIN — MAGNESIUM CITRATE 296 ML: 1.75 LIQUID ORAL at 04:55

## 2024-06-13 RX ADMIN — METHYLPREDNISOLONE SODIUM SUCCINATE 60 MG: 125 INJECTION, POWDER, FOR SOLUTION INTRAMUSCULAR; INTRAVENOUS at 17:27

## 2024-06-13 RX ADMIN — SODIUM CHLORIDE, POTASSIUM CHLORIDE, SODIUM LACTATE AND CALCIUM CHLORIDE: 600; 310; 30; 20 INJECTION, SOLUTION INTRAVENOUS at 13:33

## 2024-06-13 RX ADMIN — Medication 10 ML: at 20:00

## 2024-06-13 RX ADMIN — SIMETHICONE 120 MG: 80 TABLET, CHEWABLE ORAL at 17:26

## 2024-06-13 RX ADMIN — SIMETHICONE 120 MG: 80 TABLET, CHEWABLE ORAL at 20:00

## 2024-06-13 RX ADMIN — Medication 5 MG: at 20:19

## 2024-06-13 RX ADMIN — PANTOPRAZOLE SODIUM 40 MG: 40 INJECTION, POWDER, FOR SOLUTION INTRAVENOUS at 20:00

## 2024-06-13 RX ADMIN — PROPOFOL 30 MG: 10 INJECTION, EMULSION INTRAVENOUS at 13:40

## 2024-06-13 NOTE — PLAN OF CARE
Goal Outcome Evaluation:  Plan of Care Reviewed With: patient        Progress: improving  Outcome Evaluation: Vital signs stable. Oxygen remains stable on room air. No complaints of pain, nausea, or SOA so far this shift. IV saline locked. Multiple BMs overnight. EDG and colonoscopy scheduled for later today. Patient appears to have rested well overnight.

## 2024-06-13 NOTE — ANESTHESIA PREPROCEDURE EVALUATION
Anesthesia Evaluation     Patient summary reviewed and Nursing notes reviewed   no history of anesthetic complications:   NPO Solid Status: > 8 hours  NPO Liquid Status: > 8 hours           Airway   Mallampati: II  TM distance: >3 FB  Neck ROM: full  No difficulty expected  Dental    (+) poor dentition        Pulmonary - normal exam    breath sounds clear to auscultation  (+) a smoker (quit in 1975) Former, cigarettes,sleep apnea (snores when sleeps)  (-) shortness of breath  Cardiovascular   Exercise tolerance: good (4-7 METS)    ECG reviewed  PT is on anticoagulation therapy  Rhythm: irregular  Rate: normal    (-) pacemaker, past MI, angina, DVT    ROS comment: 12 lead EKG  - ABNORMAL ECG -  Atrial fibrillation  Borderline left axis deviation  NO PRIOR TRACING AVAILABLE FOR COMPARISON  Electronically Signed By: Lit Duke ([facility]) 10-Bruce-2024 17:12:01  Date and Time of Study: 2024-06-09 19:25:51    Delvin Ferro CRNA - ECG reviewed, appears as NSR with PACs with left axis deviation    Neuro/Psych- negative ROS  GI/Hepatic/Renal/Endo      Musculoskeletal (-) negative ROS    (-) neck pain, neck stiffness  Abdominal     Abdomen: soft.   Substance History - negative use     OB/GYN          Other   blood dyscrasia anemia,                   Anesthesia Plan    ASA 2     MAC     intravenous induction     Anesthetic plan, risks, benefits, and alternatives have been provided, discussed and informed consent has been obtained with: patient and spouse/significant other.    Use of blood products discussed with patient and spouse/significant other .      CODE STATUS:    Code Status (Patient has no pulse and is not breathing): CPR (Attempt to Resuscitate)  Medical Interventions (Patient has pulse or is breathing): Full Support

## 2024-06-13 NOTE — PROGRESS NOTES
"Hospitalist Team      Patient Care Team:  Provider, No Known as PCP - General        Chief Complaint:  Follow-up Colitis    Subjective    Mr. Thayer is seen after EGD and colonoscopy.  He feels relieved that now he has an understanding of what his underlying pathology is.  He denies chest pain and dyspnea.  He reports he is actually eating a little better this afternoon.    Objective    Vital Signs  Temp:  [97.5 °F (36.4 °C)-98.4 °F (36.9 °C)] 98 °F (36.7 °C)  Heart Rate:  [] 96  Resp:  [17-20] 18  BP: (110-166)/(65-85) 164/79  Oxygen Therapy  SpO2: 94 %  Pulse Oximetry Type: Continuous  Device (Oxygen Therapy): room air  Flow (L/min): 2  ETCO2 (mmHg): 35 mmHg}    Flowsheet Rows      Flowsheet Row First Filed Value   Admission Height 180.3 cm (71\") Documented at 06/09/2024 1920   Admission Weight 70.3 kg (155 lb) Documented at 06/09/2024 1920            Physical Exam:    General: Thin elderly appearing male in no acute distress.  Lungs: Breath sounds are clear throughout all fields.  Respirations are non-labored.  CV: Irregular rate and rhythm.  I appreciate no murmurs.  Radial pulses are 2+ and symmetric.  Abdomen: Soft and nontender.  Bowel sounds are diminished.  MSK: No clubbing, cyanosis, or edema.  Neuro: Cranial nerves II through XII are grossly intact.  Muscle bulk is decreased.  Psych: Pleasant affect.  Oriented x 3.    Results Review:     I reviewed the patient's new clinical results.    Lab Results (last 24 hours)       Procedure Component Value Units Date/Time    Tissue Pathology Exam [[id number]] Collected: 06/13/24 1343    Specimen: Tissue from Small Intestine, Duodenum; Tissue from Gastric, Body; Tissue from Esophagus, Distal; Tissue from Large Intestine, Right / Ascending Colon; Tissue from Large Intestine, Sigmoid Colon; Tissue from Large Intestine, Rectum Updated: 06/13/24 1706            Imaging Results (Last 24 Hours)       ** No results found for the last 24 hours. **            Medication " Review:   I have reviewed the patient's current medication list    Current Facility-Administered Medications:     amoxicillin-clavulanate (AUGMENTIN) 875-125 MG per tablet 1 tablet, 1 tablet, Oral, Q12H, Teofilo Orlando MD, 1 tablet at 06/12/24 2041    lactobacillus acidophilus (RISAQUAD) capsule 1 capsule, 1 capsule, Oral, Daily, Teofilo Orlando MD, 1 capsule at 06/12/24 0810    methylPREDNISolone sodium succinate (SOLU-Medrol) injection 60 mg, 60 mg, Intravenous, Q8H, Teofilo Orlando MD    morphine injection 2 mg, 2 mg, Intravenous, Q4H PRN **AND** [DISCONTINUED] naloxone (NARCAN) injection 0.4 mg, 0.4 mg, Intravenous, Q5 Min PRN, James Rodriguez DO    ondansetron ODT (ZOFRAN-ODT) disintegrating tablet 4 mg, 4 mg, Oral, Q6H PRN, Teofilo Orlando MD, 4 mg at 06/10/24 0956    pantoprazole (PROTONIX) injection 40 mg, 40 mg, Intravenous, Q12H, Teofilo Orlando MD    simethicone (MYLICON) chewable tablet 120 mg, 120 mg, Oral, 4x Daily AC & at Bedtime, Teofilo Orlando MD, 120 mg at 06/13/24 0630    sodium chloride 0.9 % flush 10 mL, 10 mL, Intravenous, PRN, Teofilo Orlando MD    sodium chloride 0.9 % flush 10 mL, 10 mL, Intravenous, Q12H, Teofilo Orlando MD, 10 mL at 06/12/24 2041    sodium chloride 0.9 % flush 10 mL, 10 mL, Intravenous, PRN, Teofilo Orlando MD    sodium chloride 0.9 % infusion 40 mL, 40 mL, Intravenous, PRN, Teofilo Orlando MD    valsartan (DIOVAN) tablet 40 mg, 40 mg, Oral, Q24H, Teofilo Orlando MD, 40 mg at 06/12/24 2041      Assessment & Plan     Acute colitis: This appears to be inflammatory in nature.  Appreciate Dr. Orlando's help.  I reviewed both the EGD and the C-scope images.  Steroids have been initiated.  Calprotectin level is still pending.  Erosive esophagitis/gastritis: Continues on IV PPI therapy.  Severe malnutrition: Dietitian following.  P.o. seems a  little better this evening.  Elevated blood pressure: Trend is little better with the initiation of valsartan.    Plan for disposition: Predicated on hospital course.    Darren Marinelli MD  06/13/24  17:14 EDT

## 2024-06-13 NOTE — BRIEF OP NOTE
ESOPHAGOGASTRODUODENOSCOPY WITH BIOPSY  Progress Note    Mehnaz Thayer  6/13/2024    Pre-op Diagnosis:   Chronic diarrhea [K52.9]  Unintentional weight loss [R63.4]       Post-Op Diagnosis Codes:     * Chronic diarrhea [K52.9]     * Unintentional weight loss [R63.4]     * Duodenal erosion [K26.9]     * Erosive gastritis [K29.60]     * Ulcerative esophagitis [K22.10]     * Ulcerative colitis [556]    Procedure/CPT® Codes:        Procedure(s):  ESOPHAGOGASTRODUODENOSCOPY WITH BIOPSY  COLONOSCOPY WITH BIOPSY              Surgeon(s):  Teofilo Orlando MD [place], Stephen Kaster, MD    Anesthesia: Monitored Anesthesia Care    Staff:   Circulator: Angel Barbosa RN  Scrub Person: Ebenezer Thao         Estimated Blood Loss: none    Urine Voided: * No values recorded between 6/13/2024  1:33 PM and 6/13/2024  2:03 PM *    Specimens:                Specimens       ID Source Type Tests Collected By Collected At Frozen?    A Small Intestine, Duodenum Tissue TISSUE PATHOLOGY EXAM   Teofilo Orlando MD 6/13/24 1343 No    Description: Duodenal Biopsy    This specimen was not marked as sent.    B Gastric, Body Tissue TISSUE PATHOLOGY EXAM   Teofilo Orlando MD 6/13/24 1344 No    Description: Gastric Biopsy    This specimen was not marked as sent.    C Esophagus, Distal Tissue TISSUE PATHOLOGY EXAM   Teofilo Orlando MD 6/13/24 1346 No    Description: Distal Esophageal Biopsy    This specimen was not marked as sent.    D Large Intestine, Right / Ascending Colon Tissue TISSUE PATHOLOGY EXAM   Teofilo Orlando MD 6/13/24 1354 No    Description: Random Biopsy    This specimen was not marked as sent.    E Large Intestine, Sigmoid Colon Tissue TISSUE PATHOLOGY EXAM   Teofilo Orlando MD 6/13/24 1355 No    Description: Random Biopsy    This specimen was not marked as sent.    F Large Intestine, Rectum Tissue TISSUE PATHOLOGY EXAM   Teofilo Orlando MD 6/13/24  1358 No    Description: Random Biopsy    This specimen was not marked as sent.                  Drains: * No LDAs found *    Findings: Duodenitis w erosions-Biopsy  Multiple gastric Erosions-biopsy  Ulcerative Esophagitis-Biopsy    Colont TI Good Prep  Pan Colitis-Severe-biopsy        Complications: none          Teofilo Orlando MD     Date: 6/13/2024  Time: 14:05 EDT

## 2024-06-13 NOTE — ANESTHESIA POSTPROCEDURE EVALUATION
Patient: Mehnaz Thayer    Procedure Summary       Date: 06/13/24 Room / Location: [facility] ENDOSCOPY 1 /  [facility]    Anesthesia Start: 1333 Anesthesia Stop: 1403    Procedures:       ESOPHAGOGASTRODUODENOSCOPY WITH BIOPSY (Esophagus)      COLONOSCOPY WITH BIOPSY Diagnosis:       Chronic diarrhea      Unintentional weight loss      Duodenal erosion      Erosive gastritis      Ulcerative esophagitis      Ulcerative colitis      (Chronic diarrhea [K52.9])      (Unintentional weight loss [R63.4])    Surgeons: Teofilo Orlando MD Provider: Delvin Ferro CRNA    Anesthesia Type: MAC ASA Status: 2            Anesthesia Type: MAC    Vitals  Vitals Value Taken Time   /85 06/13/24 1440   Temp 98 °F (36.7 °C) 06/13/24 1405   Pulse 88 06/13/24 1448   Resp 20 06/13/24 1440   SpO2 94 % 06/13/24 1448   Vitals shown include unfiled device data.        Post Anesthesia Care and Evaluation    Patient location during evaluation: PHASE II  Patient participation: complete - patient participated  Level of consciousness: awake and alert  Pain score: 0  Pain management: satisfactory to patient    Airway patency: patent  Anesthetic complications: No anesthetic complications  PONV Status: none  Cardiovascular status: acceptable  Respiratory status: acceptable  Hydration status: acceptable

## 2024-06-14 PROBLEM — E43 SEVERE MALNUTRITION: Status: ACTIVE | Noted: 2024-06-14

## 2024-06-14 LAB
ALBUMIN SERPL-MCNC: 2.9 G/DL (ref 3.5–5.2)
ALBUMIN/GLOB SERPL: 1 G/DL
ALP SERPL-CCNC: 59 U/L (ref 39–117)
ALT SERPL W P-5'-P-CCNC: 10 U/L (ref 1–41)
ANION GAP SERPL CALCULATED.3IONS-SCNC: 9.6 MMOL/L (ref 5–15)
AST SERPL-CCNC: 17 U/L (ref 1–40)
BACTERIA SPEC CULT: NORMAL
BACTERIA SPEC CULT: NORMAL
BASOPHILS # BLD AUTO: 0.05 10*3/MM3 (ref 0–0.2)
BASOPHILS NFR BLD AUTO: 1 % (ref 0–1.5)
BILIRUB SERPL-MCNC: 0.3 MG/DL (ref 0–1.2)
BUN SERPL-MCNC: 14 MG/DL (ref 8–23)
BUN/CREAT SERPL: 23 (ref 7–25)
CALCIUM SPEC-SCNC: 8.3 MG/DL (ref 8.6–10.5)
CAMPYLOBACTER STL CULT: NORMAL
CHLORIDE SERPL-SCNC: 97 MMOL/L (ref 98–107)
CO2 SERPL-SCNC: 26.4 MMOL/L (ref 22–29)
CREAT SERPL-MCNC: 0.61 MG/DL (ref 0.76–1.27)
DEPRECATED RDW RBC AUTO: 43.9 FL (ref 37–54)
E COLI SXT STL QL IA: NEGATIVE
EGFRCR SERPLBLD CKD-EPI 2021: 94.1 ML/MIN/1.73
EOSINOPHIL # BLD AUTO: 0 10*3/MM3 (ref 0–0.4)
EOSINOPHIL NFR BLD AUTO: 0 % (ref 0.3–6.2)
ERYTHROCYTE [DISTWIDTH] IN BLOOD BY AUTOMATED COUNT: 13.6 % (ref 12.3–15.4)
GLOBULIN UR ELPH-MCNC: 2.8 GM/DL
GLUCOSE SERPL-MCNC: 132 MG/DL (ref 65–99)
HCT VFR BLD AUTO: 36.7 % (ref 37.5–51)
HGB BLD-MCNC: 12 G/DL (ref 13–17.7)
IMM GRANULOCYTES # BLD AUTO: 0.39 10*3/MM3 (ref 0–0.05)
IMM GRANULOCYTES NFR BLD AUTO: 7.4 % (ref 0–0.5)
LYMPHOCYTES # BLD AUTO: 1.34 10*3/MM3 (ref 0.7–3.1)
LYMPHOCYTES NFR BLD AUTO: 25.5 % (ref 19.6–45.3)
MCH RBC QN AUTO: 28.9 PG (ref 26.6–33)
MCHC RBC AUTO-ENTMCNC: 32.7 G/DL (ref 31.5–35.7)
MCV RBC AUTO: 88.4 FL (ref 79–97)
MONOCYTES # BLD AUTO: 0.24 10*3/MM3 (ref 0.1–0.9)
MONOCYTES NFR BLD AUTO: 4.6 % (ref 5–12)
NEUTROPHILS NFR BLD AUTO: 3.23 10*3/MM3 (ref 1.7–7)
NEUTROPHILS NFR BLD AUTO: 61.5 % (ref 42.7–76)
NRBC BLD AUTO-RTO: 0 /100 WBC (ref 0–0.2)
PLATELET # BLD AUTO: 482 10*3/MM3 (ref 140–450)
PMV BLD AUTO: 8.2 FL (ref 6–12)
POTASSIUM SERPL-SCNC: 4.4 MMOL/L (ref 3.5–5.2)
PROT SERPL-MCNC: 5.7 G/DL (ref 6–8.5)
RBC # BLD AUTO: 4.15 10*6/MM3 (ref 4.14–5.8)
SALM + SHIG STL CULT: NORMAL
SODIUM SERPL-SCNC: 133 MMOL/L (ref 136–145)
WBC NRBC COR # BLD AUTO: 5.25 10*3/MM3 (ref 3.4–10.8)

## 2024-06-14 PROCEDURE — 86704 HEP B CORE ANTIBODY TOTAL: CPT | Performed by: INTERNAL MEDICINE

## 2024-06-14 PROCEDURE — 86480 TB TEST CELL IMMUN MEASURE: CPT | Performed by: INTERNAL MEDICINE

## 2024-06-14 PROCEDURE — 85025 COMPLETE CBC W/AUTO DIFF WBC: CPT | Performed by: HOSPITALIST

## 2024-06-14 PROCEDURE — 99232 SBSQ HOSP IP/OBS MODERATE 35: CPT | Performed by: INTERNAL MEDICINE

## 2024-06-14 PROCEDURE — 25010000002 METHYLPREDNISOLONE PER 125 MG: Performed by: INTERNAL MEDICINE

## 2024-06-14 PROCEDURE — 80053 COMPREHEN METABOLIC PANEL: CPT | Performed by: HOSPITALIST

## 2024-06-14 PROCEDURE — 99232 SBSQ HOSP IP/OBS MODERATE 35: CPT | Performed by: STUDENT IN AN ORGANIZED HEALTH CARE EDUCATION/TRAINING PROGRAM

## 2024-06-14 PROCEDURE — 86705 HEP B CORE ANTIBODY IGM: CPT | Performed by: INTERNAL MEDICINE

## 2024-06-14 RX ADMIN — VALSARTAN 40 MG: 80 TABLET ORAL at 08:34

## 2024-06-14 RX ADMIN — PANTOPRAZOLE SODIUM 40 MG: 40 INJECTION, POWDER, FOR SOLUTION INTRAVENOUS at 08:34

## 2024-06-14 RX ADMIN — SIMETHICONE 120 MG: 80 TABLET, CHEWABLE ORAL at 20:06

## 2024-06-14 RX ADMIN — SIMETHICONE 120 MG: 80 TABLET, CHEWABLE ORAL at 17:03

## 2024-06-14 RX ADMIN — METHYLPREDNISOLONE SODIUM SUCCINATE 60 MG: 125 INJECTION, POWDER, FOR SOLUTION INTRAMUSCULAR; INTRAVENOUS at 17:02

## 2024-06-14 RX ADMIN — METHYLPREDNISOLONE SODIUM SUCCINATE 60 MG: 125 INJECTION, POWDER, FOR SOLUTION INTRAMUSCULAR; INTRAVENOUS at 08:35

## 2024-06-14 RX ADMIN — METHYLPREDNISOLONE SODIUM SUCCINATE 60 MG: 125 INJECTION, POWDER, FOR SOLUTION INTRAMUSCULAR; INTRAVENOUS at 00:03

## 2024-06-14 RX ADMIN — AMOXICILLIN AND CLAVULANATE POTASSIUM 1 TABLET: 875; 125 TABLET, FILM COATED ORAL at 08:34

## 2024-06-14 RX ADMIN — PANTOPRAZOLE SODIUM 40 MG: 40 INJECTION, POWDER, FOR SOLUTION INTRAVENOUS at 20:06

## 2024-06-14 RX ADMIN — Medication 1 CAPSULE: at 08:34

## 2024-06-14 RX ADMIN — AMOXICILLIN AND CLAVULANATE POTASSIUM 1 TABLET: 875; 125 TABLET, FILM COATED ORAL at 20:06

## 2024-06-14 RX ADMIN — Medication 10 ML: at 08:35

## 2024-06-14 RX ADMIN — SIMETHICONE 120 MG: 80 TABLET, CHEWABLE ORAL at 11:55

## 2024-06-14 RX ADMIN — Medication 10 ML: at 22:35

## 2024-06-14 RX ADMIN — Medication 5 MG: at 21:24

## 2024-06-14 RX ADMIN — SIMETHICONE 120 MG: 80 TABLET, CHEWABLE ORAL at 06:30

## 2024-06-14 NOTE — PROGRESS NOTES
Patient: Mehnaz Thayer    [unfilled]    Anesthesia Type: MAC  Patient location: Labor and Delivery  Last vitals  BP      Temp      Pulse     Resp      SpO2        Post vital signs: stable  Level of consciousness: awake, alert, and oriented    Post-anesthesia pain: adequate analgesia  Airway patency: patent  Respiratory: unassisted  Cardiovascular: stable and blood pressure at baseline  Hydration: euvolemic    Difficult Airway: no  Anesthetic complications: no

## 2024-06-14 NOTE — PROGRESS NOTES
Adult Nutrition  Assessment/PES    Patient Name:  Mehnaz Thayer  YOB: 1940  MRN: [id number]  Admit Date:  6/9/2024    Assessment Date:  6/14/2024    Comments:  Adv diet as tolerated.  Agree with Boost Plus BID as ordered.    Encourage po and voice food prefs. Pt didn't care for prosource supplement so it has been discontinued.    Will cont to follow and monitor.      Reason for Assessment       Row Name 06/14/24 1134          Reason for Assessment    Reason For Assessment follow-up protocol     Diagnosis gastrointestinal disease  colitis, diarrhea , wt loss s/p EGD, c-scope: ulcerative colitis, esophagitis, gastritis, duodenitis     Identified At Risk by Screening Criteria MST SCORE 2+                    Nutrition/Diet History       Row Name 06/14/24 1139          Nutrition/Diet History    Typical Intake (Food/Fluid/EN/PN) Spoke w pt who tried to doctor up grits this am, drank milk. looking forward to blended soup for lunch. Open to trying [employer] boost                    Labs/Tests/Procedures/Meds       Row Name 06/14/24 1139          Labs/Procedures/Meds    Lab Results Reviewed reviewed     Lab Results Comments Na 133 L, glu 132        Diagnostic Tests/Procedures    Diagnostic Test/Procedure Reviewed reviewed        Medications    Pertinent Medications Reviewed reviewed     Pertinent Medications Comments solumedrol, risaquad                        Nutrition Prescription Ordered       Row Name 06/14/24 1140          Nutrition Prescription PO    Current PO Diet Full Liquid     Supplement Boost Plus (Ensure Enlive, Ensure Plus)     Supplement Frequency 2 times a day                    Evaluation of Received Nutrient/Fluid Intake       Row Name 06/14/24 1141          Fluid Intake Evaluation    Oral Fluid (mL) 720  32%        PO Evaluation    Number of Days PO Intake Evaluated Insufficient Data                       Problem/Interventions:   Problem 1       Row Name 06/14/24 1141          Nutrition  Diagnoses Problem 1    Problem 1 Other (comment)  Severe chronic disease related malnutrition related to chronic on acute diarrhea as evidenced by less 75% of est energy requirement >= 1 month, >7.5% wt loss in past 3 months, severe muscle wasting and fat loss on exam.                          Intervention Goal       Row Name 06/14/24 1141          Intervention Goal    General Meet nutritional needs for age/condition     PO Establish PO;Tolerate PO;PO intake (%)     PO Intake % 50 %  or greater of meals/supplements                    Nutrition Intervention       Row Name 06/14/24 1141          Nutrition Intervention    RD/Tech Action Encourage intake;Supplement provided;Follow Tx progress                      Education/Evaluation       Row Name 06/14/24 1141          Education    Education Other (comment)  Edu on Full Liquids and Boost Plus supplement        Monitor/Evaluation    Monitor Per protocol;I&O;PO intake;Supplement intake;Pertinent labs;Weight;Symptoms     Education Follow-up Other (comment)  verbalized understanding                     Electronically signed by:  Jil Gonzalez RD  06/14/24 11:42 EDT

## 2024-06-14 NOTE — PLAN OF CARE
Goal Outcome Evaluation:  Plan of Care Reviewed With: patient, spouse        Progress: improving  Outcome Evaluation: Patient here for colitis, had EGD & colonscopy yesterday, biopsies pending. full liquid diet, wife at bedside this afternoon. Patient having bowel movements today. Ambulating in room. Cardiac monitoring discontinued today. Anticipate home with spouse at discharge.

## 2024-06-14 NOTE — PROGRESS NOTES
[facility] HOSPITALIST      Name: Mehnaz Thayer ADMIT: 2024   : 1940  PCP: Provider, No Known    MRN: [id number] LOS: 4 days   AGE/SEX: 84 y.o. male  ROOM: [facility]   Chief Complaint Follow-up colitis     Subjective   Subjective   Patient is doing well this morning, he feels signifincantly improved than previously and feels that he should've came in sooner to be evaluated.     Review of Systems  As above     Objective   Objective   Vital Signs  Temp:  [97.5 °F (36.4 °C)-99.2 °F (37.3 °C)] 97.5 °F (36.4 °C)  Heart Rate:  [] 77  Resp:  [17-20] 18  BP: (128-170)/(65-85) 166/79  SpO2:  [91 %-98 %] 95 %  on  Flow (L/min):  [2] 2;   Device (Oxygen Therapy): room air  Body mass index is 23.26 kg/m².  Physical Exam  Vitals and nursing note reviewed.   Constitutional:       Comments: Thin appearing male   HENT:      Head: Atraumatic.      Nose: Nose normal.      Mouth/Throat:      Mouth: Mucous membranes are moist.      Pharynx: Oropharynx is clear.   Eyes:      Extraocular Movements: Extraocular movements intact.      Conjunctiva/sclera: Conjunctivae normal.   Cardiovascular:      Rate and Rhythm: Normal rate and regular rhythm.   Pulmonary:      Effort: Pulmonary effort is normal. No respiratory distress.   Abdominal:      General: Abdomen is flat. Bowel sounds are normal.      Palpations: Abdomen is soft.   Musculoskeletal:         General: Normal range of motion.      Cervical back: Normal range of motion.   Neurological:      Mental Status: He is alert.         Results Review     I reviewed the patient's new clinical results.  Results from last 7 days   Lab Units 24  0352 24  0402 06/10/24  0341 06/09/24  1929   WBC 10*3/mm3 5.25 10.03 14.16* 16.17*   HEMOGLOBIN g/dL 12.0* 11.1* 11.4* 12.7*   PLATELETS 10*3/mm3 482* 402 396 428     Results from last 7 days   Lab Units 24  0351 24  0402 06/10/24  03424   SODIUM mmol/L 133* 136 134* 133*   POTASSIUM  "mmol/L 4.4 3.7 4.1 4.0   CHLORIDE mmol/L 97* 103 100 97*   CO2 mmol/L 26.4 22.9 21.8* 22.2   BUN mg/dL 14 21 22 23   CREATININE mg/dL 0.61* 0.59* 0.76 1.02   GLUCOSE mg/dL 132* 111* 114* 130*   Estimated Creatinine Clearance: 94.7 mL/min (A) (by C-G formula based on SCr of 0.61 mg/dL (L)).  Results from last 7 days   Lab Units 06/14/24  0351 06/11/24  0402 06/09/24  1929   ALBUMIN g/dL 2.9* 2.8* 3.4*   BILIRUBIN mg/dL 0.3 0.3 0.8   ALK PHOS U/L 59 87 87   AST (SGOT) U/L 17 14 12   ALT (SGPT) U/L 10 8 9     Results from last 7 days   Lab Units 06/14/24  0351 06/11/24  0402 06/10/24  0341 06/09/24  1929   CALCIUM mg/dL 8.3* 8.6 8.2* 8.9   ALBUMIN g/dL 2.9* 2.8*  --  3.4*   MAGNESIUM mg/dL  --   --   --  2.3     Results from last 7 days   Lab Units 06/09/24 2026   LACTATE mmol/L 1.6   No results found for: \"COVID19\"  No results found for: \"HGBA1C\", \"POCGLU\"    CT Abdomen Pelvis With Contrast  Narrative: CT ABDOMEN PELVIS W CONTRAST    Date of Exam: 6/9/2024 8:46 PM EDT    Indication: diarrhea since Oct, weakness, loss of appetite, 40# weight loss, no known med hx.    Comparison: None available.    Technique: Axial CT images were obtained of the abdomen and pelvis following the uneventful intravenous administration of iodinated contrast. Sagittal and coronal reconstructions were performed.  Automated exposure control and iterative reconstruction   methods were used.    Findings:  Lung Bases:     The lung bases are clear. There is mild coronary artery calcific atherosclerosis.    Liver:  There is mild fatty infiltration of liver. There are no focal liver lesions. There is no intrahepatic biliary ductal dilatation.    Biliary/Gallbladder:    There is a large stone in the neck of the gallbladder. There is no pericholecystic fluid or gallbladder wall thickening. The biliary tree is nondilated.    Spleen:  Spleen is normal in size and CT density.    Pancreas:    Pancreas is normal. There is no evidence of pancreatic mass or " peripancreatic fluid.    Kidneys:    Kidneys are normal in size. There are no stones or hydronephrosis.    Adrenals:    Adrenal glands are unremarkable.    Retroperitoneal/Lymph Nodes/Vasculature:    No retroperitoneal adenopathy is identified.    Gastrointestinal/Mesentery:    There is an abnormal appearance of the colon. Primarily involving the ascending and transverse portions of the colon, there is wall thickening and a fluid-filled appearance of the colon with surrounding fat stranding consistent with colitis. The distal   colon also demonstrates some dilatation or wall thickening but without intraluminal fluid. The small bowel is unremarkable with a few fluid-filled loops but without evidence of inflammation. There is no free air. The appendix is dilated and fluid-filled   but appears to be without significant inflammatory change    Bladder:    The bladder is normal.    Genital:     Unremarkable          Bony Structures:     Visualized bony structures are consistent with the patient's age.  Impression: Impression:  Abnormal appearance of the colon. The ascending and transverse portions of the colon are fluid-filled and dilated with wall thickening and surrounding fat stranding consistent with colitis. The distal colon also demonstrates some wall thickening but   without intraluminal fluid. The appendix is dilated and fluid-filled but without significant inflammatory change.    Electronically Signed: Sigifredo Lee MD    6/9/2024 8:59 PM EDT    Workstation ID: [id number]  XR Chest 1 View  Narrative: XR CHEST 1 VW    Date of Exam: 6/9/2024 8:26 PM EDT    Indication: tachycardia, leukocytosis, no source    Comparison: None available.    Findings:  There are no airspace consolidations. No pleural fluid. No pneumothorax. The pulmonary vasculature appears within normal limits. The cardiac and mediastinal silhouette appear unremarkable. No acute osseous abnormality identified.  Impression: Impression:  No active  disease.    Electronically Signed: Sigifredo Lee MD    6/9/2024 8:50 PM EDT    Workstation ID: [id number]    I reviewed the patient's daily medications.  Scheduled Medications  amoxicillin-clavulanate, 1 tablet, Oral, Q12H  lactobacillus acidophilus, 1 capsule, Oral, Daily  methylPREDNISolone sodium succinate, 60 mg, Intravenous, Q8H  pantoprazole, 40 mg, Intravenous, Q12H  simethicone, 120 mg, Oral, 4x Daily AC & at Bedtime  sodium chloride, 10 mL, Intravenous, Q12H  valsartan, 40 mg, Oral, Q24H    Infusions   Diet  Diet: Liquid; Full Liquid; Fluid Consistency: Thin (IDDSI 0)       Assessment/Plan     Active Hospital Problems    Diagnosis  POA    **Colitis [K52.9]  Yes    Severe malnutrition [E43]  Yes    Acute colitis [K52.9]  Yes    Chronic diarrhea [K52.9]  Yes    Unintentional weight loss [R63.4]  Yes      Resolved Hospital Problems   No resolved problems to display.       84 y.o. male admitted with Colitis.    Moderate to severe ulcerative colitis; GI following, currently on IV steroids and advance diet as tolerated slowly  Ulcerative esophagitis: PPI IV BID   Elevated BP without diagnosis of hypertension: suspect he truly does have some component of hypertension, continue valsartan.   Severe malnutrition: in the setting of inflammatory disease, nutrition following, follow PO intake.     SCDs for DVT prophylaxis.  Full code.  Discussed with patient, nursing staff, CCP, and care team on multidisciplinary rounds.  Anticipate discharge home when cleared by consultants.      Diamond Henson MD  [facility] HOSPITALIST  06/14/24  09:48 EDT

## 2024-06-14 NOTE — PROGRESS NOTES
GI Daily Progress Note    Assessment/Plan:      Colitis    Acute colitis    Chronic diarrhea    Unintentional weight loss       LOS: 4 days     Mehnaz Thayer is a 84 y.o. male who was admitted with Colitis. He reports the symptoms are improving with treatment. Rested well with an Early BM but no real pain and tolerated his Ensure    Subjective:    Patient expresses diarrhea  Patient denies vomiting, difficulty swallowing, and loss of appetite    Objective:    Vital signs in last 24 hours:  Temp:  [97.5 °F (36.4 °C)-99.2 °F (37.3 °C)] 97.5 °F (36.4 °C)  Heart Rate:  [] 77  Resp:  [17-20] 18  BP: (128-170)/(65-85) 166/79    Intake/Output last 3 shifts:  I/O last 3 completed shifts:  In: 1816 [P.O.:1616; I.V.:200]  Out: -   Intake/Output this shift:  No intake/output data recorded.    Physical Exam:Abdomen  Sounds Normal Active Bowel Sounds   Distension Soft   Tenderness Nontender     Imaging Results (Last 72 Hours)       ** No results found for the last 72 hours. **            WBC   Date Value Ref Range Status   06/14/2024 5.25 3.40 - 10.80 10*3/mm3 Final     RBC   Date Value Ref Range Status   06/14/2024 4.15 4.14 - 5.80 10*6/mm3 Final     Hemoglobin   Date Value Ref Range Status   06/14/2024 12.0 (L) 13.0 - 17.7 g/dL Final     Hematocrit   Date Value Ref Range Status   06/14/2024 36.7 (L) 37.5 - 51.0 % Final     MCV   Date Value Ref Range Status   06/14/2024 88.4 79.0 - 97.0 fL Final     MCH   Date Value Ref Range Status   06/14/2024 28.9 26.6 - 33.0 pg Final     MCHC   Date Value Ref Range Status   06/14/2024 32.7 31.5 - 35.7 g/dL Final     RDW   Date Value Ref Range Status   06/14/2024 13.6 12.3 - 15.4 % Final     RDW-SD   Date Value Ref Range Status   06/14/2024 43.9 37.0 - 54.0 fl Final     MPV   Date Value Ref Range Status   06/14/2024 8.2 6.0 - 12.0 fL Final     Platelets   Date Value Ref Range Status   06/14/2024 482 (H) 140 - 450 10*3/mm3 Final     Neutrophil %   Date Value Ref Range Status    06/14/2024 61.5 42.7 - 76.0 % Final     Lymphocyte %   Date Value Ref Range Status   06/14/2024 25.5 19.6 - 45.3 % Final     Monocyte %   Date Value Ref Range Status   06/14/2024 4.6 (L) 5.0 - 12.0 % Final     Eosinophil %   Date Value Ref Range Status   06/14/2024 0.0 (L) 0.3 - 6.2 % Final     Basophil %   Date Value Ref Range Status   06/14/2024 1.0 0.0 - 1.5 % Final     Immature Grans %   Date Value Ref Range Status   06/14/2024 7.4 (H) 0.0 - 0.5 % Final     Neutrophils, Absolute   Date Value Ref Range Status   06/14/2024 3.23 1.70 - 7.00 10*3/mm3 Final     Lymphocytes, Absolute   Date Value Ref Range Status   06/14/2024 1.34 0.70 - 3.10 10*3/mm3 Final     Monocytes, Absolute   Date Value Ref Range Status   06/14/2024 0.24 0.10 - 0.90 10*3/mm3 Final     Eosinophils, Absolute   Date Value Ref Range Status   06/14/2024 0.00 0.00 - 0.40 10*3/mm3 Final     Basophils, Absolute   Date Value Ref Range Status   06/14/2024 0.05 0.00 - 0.20 10*3/mm3 Final     Immature Grans, Absolute   Date Value Ref Range Status   06/14/2024 0.39 (H) 0.00 - 0.05 10*3/mm3 Final     nRBC   Date Value Ref Range Status   06/14/2024 0.0 0.0 - 0.2 /100 WBC Final       Glucose   Date Value Ref Range Status   06/14/2024 132 (H) 65 - 99 mg/dL Final     Sodium   Date Value Ref Range Status   06/14/2024 133 (L) 136 - 145 mmol/L Final     Potassium   Date Value Ref Range Status   06/14/2024 4.4 3.5 - 5.2 mmol/L Final     CO2   Date Value Ref Range Status   06/14/2024 26.4 22.0 - 29.0 mmol/L Final     Chloride   Date Value Ref Range Status   06/14/2024 97 (L) 98 - 107 mmol/L Final     Anion Gap   Date Value Ref Range Status   06/14/2024 9.6 5.0 - 15.0 mmol/L Final     Creatinine   Date Value Ref Range Status   06/14/2024 0.61 (L) 0.76 - 1.27 mg/dL Final     BUN   Date Value Ref Range Status   06/14/2024 14 8 - 23 mg/dL Final     BUN/Creatinine Ratio   Date Value Ref Range Status   06/14/2024 23.0 7.0 - 25.0 Final     Calcium   Date Value Ref Range  Status   06/14/2024 8.3 (L) 8.6 - 10.5 mg/dL Final     Alkaline Phosphatase   Date Value Ref Range Status   06/14/2024 59 39 - 117 U/L Final     Total Protein   Date Value Ref Range Status   06/14/2024 5.7 (L) 6.0 - 8.5 g/dL Final     ALT (SGPT)   Date Value Ref Range Status   06/14/2024 10 1 - 41 U/L Final     AST (SGOT)   Date Value Ref Range Status   06/14/2024 17 1 - 40 U/L Final     Total Bilirubin   Date Value Ref Range Status   06/14/2024 0.3 0.0 - 1.2 mg/dL Final     Albumin   Date Value Ref Range Status   06/14/2024 2.9 (L) 3.5 - 5.2 g/dL Final     Globulin   Date Value Ref Range Status   06/14/2024 2.8 gm/dL Final     # Colitis per CT Imaging   # Chronic Diarrhea   # Unintentional Weight Loss     EGD- Ulcerative Esophagitis, erosive Gastritis, Duodenitis-Path Pending  Colon- Mod/Severe Ulcerative Colitis-path pending    On BID PPI and IV Steroids will presumably institute Remicade prior to D/C, advancing diet slowly.

## 2024-06-14 NOTE — PLAN OF CARE
Goal Outcome Evaluation:  Plan of Care Reviewed With: patient        Progress: improving  Outcome Evaluation: POD #1. Vital signs stable. Oxygen stable on room air. Tolerating diet. Tele in place, Afib overnight. IV saline locked. Patient appears to have rested well overnight.

## 2024-06-15 LAB
ALBUMIN SERPL-MCNC: 2.7 G/DL (ref 3.5–5.2)
ALBUMIN/GLOB SERPL: 1.1 G/DL
ALP SERPL-CCNC: 58 U/L (ref 39–117)
ALT SERPL W P-5'-P-CCNC: 11 U/L (ref 1–41)
ANION GAP SERPL CALCULATED.3IONS-SCNC: 9.8 MMOL/L (ref 5–15)
AST SERPL-CCNC: 14 U/L (ref 1–40)
BASOPHILS # BLD AUTO: 0.08 10*3/MM3 (ref 0–0.2)
BASOPHILS NFR BLD AUTO: 0.6 % (ref 0–1.5)
BILIRUB SERPL-MCNC: 0.3 MG/DL (ref 0–1.2)
BUN SERPL-MCNC: 19 MG/DL (ref 8–23)
BUN/CREAT SERPL: 27.5 (ref 7–25)
CALCIUM SPEC-SCNC: 8.1 MG/DL (ref 8.6–10.5)
CHLORIDE SERPL-SCNC: 98 MMOL/L (ref 98–107)
CO2 SERPL-SCNC: 26.2 MMOL/L (ref 22–29)
CREAT SERPL-MCNC: 0.69 MG/DL (ref 0.76–1.27)
DEPRECATED RDW RBC AUTO: 42 FL (ref 37–54)
EGFRCR SERPLBLD CKD-EPI 2021: 90.7 ML/MIN/1.73
EOSINOPHIL # BLD AUTO: 0 10*3/MM3 (ref 0–0.4)
EOSINOPHIL NFR BLD AUTO: 0 % (ref 0.3–6.2)
ERYTHROCYTE [DISTWIDTH] IN BLOOD BY AUTOMATED COUNT: 13.4 % (ref 12.3–15.4)
GLOBULIN UR ELPH-MCNC: 2.5 GM/DL
GLUCOSE SERPL-MCNC: 134 MG/DL (ref 65–99)
HBV CORE AB SERPL QL IA: NEGATIVE
HBV CORE IGM SERPL QL IA: NEGATIVE
HCT VFR BLD AUTO: 36.4 % (ref 37.5–51)
HGB BLD-MCNC: 12.2 G/DL (ref 13–17.7)
IMM GRANULOCYTES # BLD AUTO: 0.92 10*3/MM3 (ref 0–0.05)
IMM GRANULOCYTES NFR BLD AUTO: 6.3 % (ref 0–0.5)
LYMPHOCYTES # BLD AUTO: 1.69 10*3/MM3 (ref 0.7–3.1)
LYMPHOCYTES NFR BLD AUTO: 11.7 % (ref 19.6–45.3)
MCH RBC QN AUTO: 29.3 PG (ref 26.6–33)
MCHC RBC AUTO-ENTMCNC: 33.5 G/DL (ref 31.5–35.7)
MCV RBC AUTO: 87.3 FL (ref 79–97)
MONOCYTES # BLD AUTO: 1.08 10*3/MM3 (ref 0.1–0.9)
MONOCYTES NFR BLD AUTO: 7.5 % (ref 5–12)
NEUTROPHILS NFR BLD AUTO: 10.72 10*3/MM3 (ref 1.7–7)
NEUTROPHILS NFR BLD AUTO: 73.9 % (ref 42.7–76)
NRBC BLD AUTO-RTO: 0 /100 WBC (ref 0–0.2)
PLATELET # BLD AUTO: 495 10*3/MM3 (ref 140–450)
PMV BLD AUTO: 8.2 FL (ref 6–12)
POTASSIUM SERPL-SCNC: 4.2 MMOL/L (ref 3.5–5.2)
PROT SERPL-MCNC: 5.2 G/DL (ref 6–8.5)
RBC # BLD AUTO: 4.17 10*6/MM3 (ref 4.14–5.8)
SODIUM SERPL-SCNC: 134 MMOL/L (ref 136–145)
WBC NRBC COR # BLD AUTO: 14.49 10*3/MM3 (ref 3.4–10.8)

## 2024-06-15 PROCEDURE — 99232 SBSQ HOSP IP/OBS MODERATE 35: CPT | Performed by: INTERNAL MEDICINE

## 2024-06-15 PROCEDURE — 99233 SBSQ HOSP IP/OBS HIGH 50: CPT | Performed by: STUDENT IN AN ORGANIZED HEALTH CARE EDUCATION/TRAINING PROGRAM

## 2024-06-15 PROCEDURE — 85025 COMPLETE CBC W/AUTO DIFF WBC: CPT | Performed by: STUDENT IN AN ORGANIZED HEALTH CARE EDUCATION/TRAINING PROGRAM

## 2024-06-15 PROCEDURE — 94799 UNLISTED PULMONARY SVC/PX: CPT

## 2024-06-15 PROCEDURE — 25010000002 METHYLPREDNISOLONE PER 125 MG: Performed by: INTERNAL MEDICINE

## 2024-06-15 PROCEDURE — 80053 COMPREHEN METABOLIC PANEL: CPT | Performed by: STUDENT IN AN ORGANIZED HEALTH CARE EDUCATION/TRAINING PROGRAM

## 2024-06-15 RX ORDER — PANTOPRAZOLE SODIUM 40 MG/1
40 TABLET, DELAYED RELEASE ORAL
Status: DISCONTINUED | OUTPATIENT
Start: 2024-06-16 | End: 2024-06-18 | Stop reason: HOSPADM

## 2024-06-15 RX ORDER — METHYLPREDNISOLONE SODIUM SUCCINATE 125 MG/2ML
60 INJECTION, POWDER, LYOPHILIZED, FOR SOLUTION INTRAMUSCULAR; INTRAVENOUS EVERY 12 HOURS
Status: COMPLETED | OUTPATIENT
Start: 2024-06-15 | End: 2024-06-16

## 2024-06-15 RX ADMIN — METHYLPREDNISOLONE SODIUM SUCCINATE 60 MG: 125 INJECTION, POWDER, FOR SOLUTION INTRAMUSCULAR; INTRAVENOUS at 00:59

## 2024-06-15 RX ADMIN — SIMETHICONE 120 MG: 80 TABLET, CHEWABLE ORAL at 17:43

## 2024-06-15 RX ADMIN — Medication 10 ML: at 21:25

## 2024-06-15 RX ADMIN — SIMETHICONE 120 MG: 80 TABLET, CHEWABLE ORAL at 07:39

## 2024-06-15 RX ADMIN — PANTOPRAZOLE SODIUM 40 MG: 40 INJECTION, POWDER, FOR SOLUTION INTRAVENOUS at 08:33

## 2024-06-15 RX ADMIN — VALSARTAN 40 MG: 80 TABLET ORAL at 08:33

## 2024-06-15 RX ADMIN — SIMETHICONE 120 MG: 80 TABLET, CHEWABLE ORAL at 12:41

## 2024-06-15 RX ADMIN — SIMETHICONE 120 MG: 80 TABLET, CHEWABLE ORAL at 21:24

## 2024-06-15 RX ADMIN — PANTOPRAZOLE SODIUM 40 MG: 40 INJECTION, POWDER, FOR SOLUTION INTRAVENOUS at 21:23

## 2024-06-15 RX ADMIN — METHYLPREDNISOLONE SODIUM SUCCINATE 60 MG: 125 INJECTION, POWDER, FOR SOLUTION INTRAMUSCULAR; INTRAVENOUS at 21:24

## 2024-06-15 RX ADMIN — METHYLPREDNISOLONE SODIUM SUCCINATE 60 MG: 125 INJECTION, POWDER, FOR SOLUTION INTRAMUSCULAR; INTRAVENOUS at 08:33

## 2024-06-15 RX ADMIN — Medication 10 ML: at 08:33

## 2024-06-15 RX ADMIN — Medication 1 CAPSULE: at 08:33

## 2024-06-15 NOTE — PLAN OF CARE
Goal Outcome Evaluation:              Outcome Evaluation: VSS, up ad viral, tolerating diet,, no c/o pain.no c/o lose stools reported.

## 2024-06-15 NOTE — PLAN OF CARE
Goal Outcome Evaluation:  Plan of Care Reviewed With: patient        Progress: improving  Outcome Evaluation: up ad viral, wife at bedside, rested well this shift, no c/o pain. full liquid diet tolerating well.

## 2024-06-15 NOTE — PROGRESS NOTES
[facility] HOSPITALIST      Name: Mehnaz Thayer ADMIT: 2024   : 1940  PCP: Provider, No Known    MRN: [id number] LOS: 5 days   AGE/SEX: 84 y.o. male  ROOM: [facility]/   Chief Complaint Follow-up colitis     Subjective   Subjective   Doing ok this morning, significant decrease in the quantity of BM that he has been having and interested in advancing his diet.     Review of Systems   All other systems reviewed and are negative.    As above     Objective   Objective   Vital Signs  Temp:  [97.5 °F (36.4 °C)-98.2 °F (36.8 °C)] 97.6 °F (36.4 °C)  Heart Rate:  [74-84] 75  Resp:  [16-18] 16  BP: (137-164)/(65-74) 139/74  SpO2:  [93 %-96 %] 96 %  on   ;   Device (Oxygen Therapy): room air  Body mass index is 23.26 kg/m².  Physical Exam  Constitutional:       Appearance: Normal appearance.   HENT:      Head: Normocephalic and atraumatic.      Mouth/Throat:      Mouth: Mucous membranes are moist.      Pharynx: Oropharynx is clear.   Eyes:      Extraocular Movements: Extraocular movements intact.      Conjunctiva/sclera: Conjunctivae normal.   Cardiovascular:      Rate and Rhythm: Normal rate and regular rhythm.   Pulmonary:      Effort: Pulmonary effort is normal.      Breath sounds: Normal breath sounds.   Abdominal:      General: Abdomen is flat.      Palpations: Abdomen is soft.   Skin:     General: Skin is warm and dry.   Neurological:      Mental Status: He is alert.         Results Review     I reviewed the patient's new clinical results.  Results from last 7 days   Lab Units 06/15/24  0616 24  0352 24  0402 06/10/24  0341   WBC 10*3/mm3 14.49* 5.25 10.03 14.16*   HEMOGLOBIN g/dL 12.2* 12.0* 11.1* 11.4*   PLATELETS 10*3/mm3 495* 482* 402 396     Results from last 7 days   Lab Units 06/15/24  0616 24  0351 24  0402 06/10/24  0341   SODIUM mmol/L 134* 133* 136 134*   POTASSIUM mmol/L 4.2 4.4 3.7 4.1   CHLORIDE mmol/L 98 97* 103 100   CO2 mmol/L 26.2 26.4 22.9 21.8*   BUN  "mg/dL 19 14 21 22   CREATININE mg/dL 0.69* 0.61* 0.59* 0.76   GLUCOSE mg/dL 134* 132* 111* 114*   Estimated Creatinine Clearance: 83.7 mL/min (A) (by C-G formula based on SCr of 0.69 mg/dL (L)).  Results from last 7 days   Lab Units 06/15/24  0616 06/14/24  0351 06/11/24  0402 06/09/24  1929   ALBUMIN g/dL 2.7* 2.9* 2.8* 3.4*   BILIRUBIN mg/dL 0.3 0.3 0.3 0.8   ALK PHOS U/L 58 59 87 87   AST (SGOT) U/L 14 17 14 12   ALT (SGPT) U/L 11 10 8 9     Results from last 7 days   Lab Units 06/15/24  0616 06/14/24  0351 06/11/24  0402 06/10/24  0341 06/09/24 1929   CALCIUM mg/dL 8.1* 8.3* 8.6 8.2* 8.9   ALBUMIN g/dL 2.7* 2.9* 2.8*  --  3.4*   MAGNESIUM mg/dL  --   --   --   --  2.3     Results from last 7 days   Lab Units 06/09/24 2026   LACTATE mmol/L 1.6   No results found for: \"COVID19\"  No results found for: \"HGBA1C\", \"POCGLU\"    CT Abdomen Pelvis With Contrast  Narrative: CT ABDOMEN PELVIS W CONTRAST    Date of Exam: 6/9/2024 8:46 PM EDT    Indication: diarrhea since Oct, weakness, loss of appetite, 40# weight loss, no known med hx.    Comparison: None available.    Technique: Axial CT images were obtained of the abdomen and pelvis following the uneventful intravenous administration of iodinated contrast. Sagittal and coronal reconstructions were performed.  Automated exposure control and iterative reconstruction   methods were used.    Findings:  Lung Bases:     The lung bases are clear. There is mild coronary artery calcific atherosclerosis.    Liver:  There is mild fatty infiltration of liver. There are no focal liver lesions. There is no intrahepatic biliary ductal dilatation.    Biliary/Gallbladder:    There is a large stone in the neck of the gallbladder. There is no pericholecystic fluid or gallbladder wall thickening. The biliary tree is nondilated.    Spleen:  Spleen is normal in size and CT density.    Pancreas:    Pancreas is normal. There is no evidence of pancreatic mass or peripancreatic " fluid.    Kidneys:    Kidneys are normal in size. There are no stones or hydronephrosis.    Adrenals:    Adrenal glands are unremarkable.    Retroperitoneal/Lymph Nodes/Vasculature:    No retroperitoneal adenopathy is identified.    Gastrointestinal/Mesentery:    There is an abnormal appearance of the colon. Primarily involving the ascending and transverse portions of the colon, there is wall thickening and a fluid-filled appearance of the colon with surrounding fat stranding consistent with colitis. The distal   colon also demonstrates some dilatation or wall thickening but without intraluminal fluid. The small bowel is unremarkable with a few fluid-filled loops but without evidence of inflammation. There is no free air. The appendix is dilated and fluid-filled   but appears to be without significant inflammatory change    Bladder:    The bladder is normal.    Genital:     Unremarkable          Bony Structures:     Visualized bony structures are consistent with the patient's age.  Impression: Impression:  Abnormal appearance of the colon. The ascending and transverse portions of the colon are fluid-filled and dilated with wall thickening and surrounding fat stranding consistent with colitis. The distal colon also demonstrates some wall thickening but   without intraluminal fluid. The appendix is dilated and fluid-filled but without significant inflammatory change.    Electronically Signed: Sigifredo Lee MD    6/9/2024 8:59 PM EDT    Workstation ID: [id number]  XR Chest 1 View  Narrative: XR CHEST 1 VW    Date of Exam: 6/9/2024 8:26 PM EDT    Indication: tachycardia, leukocytosis, no source    Comparison: None available.    Findings:  There are no airspace consolidations. No pleural fluid. No pneumothorax. The pulmonary vasculature appears within normal limits. The cardiac and mediastinal silhouette appear unremarkable. No acute osseous abnormality identified.  Impression: Impression:  No active  disease.    Electronically Signed: Sigifredo Lee MD    6/9/2024 8:50 PM EDT    Workstation ID: [id number]    I reviewed the patient's daily medications.  Scheduled Medications  lactobacillus acidophilus, 1 capsule, Oral, Daily  methylPREDNISolone sodium succinate, 60 mg, Intravenous, Q12H  [START ON 6/16/2024] pantoprazole, 40 mg, Oral, BID AC  pantoprazole, 40 mg, Intravenous, Q12H  simethicone, 120 mg, Oral, 4x Daily AC & at Bedtime  sodium chloride, 10 mL, Intravenous, Q12H  valsartan, 40 mg, Oral, Q24H    Infusions   Diet  Diet: Gastrointestinal; Fiber-Restricted; Fluid Consistency: Thin (IDDSI 0)       Assessment/Plan     Active Hospital Problems    Diagnosis  POA    **Colitis [K52.9]  Yes    Severe malnutrition [E43]  Yes    Acute colitis [K52.9]  Yes    Chronic diarrhea [K52.9]  Yes    Unintentional weight loss [R63.4]  Yes      Resolved Hospital Problems   No resolved problems to display.       84 y.o. male admitted with Colitis.    Moderate to severe ulcerative colitis; GI following, currently on IV steroids q6h with plan start to wean in the upcoming days. Will plan to advance diet to low residue diet today.   Elevated BP without diagnosis of hypertension: suspect he truly does have some component of hypertension, continue valsartan.   Severe malnutrition: in the setting of inflammatory disease, nutrition following did not want supplements, encourage ongoing good diet.    Leukocytosis; in the setting  of IV steroids, do not suspect underlying infection.     SCDs for DVT prophylaxis.  Full code.  Discussed with patient, consulting provider, CCP, and care team on multidisciplinary rounds.  Anticipate discharge home when cleared by consultants.      Diamond Henson MD  [facility] [place] HOSPITALIST  06/15/24  11:11 EDT

## 2024-06-15 NOTE — CONSULTS
initiated spiritual care/hospitality visit with patient and family providing prayer and theological discussion. Will follow closely.

## 2024-06-15 NOTE — PROGRESS NOTES
GI Daily Progress Note    Assessment/Plan:      Colitis    Acute colitis    Chronic diarrhea    Unintentional weight loss    Severe malnutrition       LOS: 5 days     Mehnaz Thayer is a 84 y.o. male who was admitted with Colitis. He reports the symptoms are improving with treatment. Had a bout of night sweats last PM and only one continent BM this Am does get gas waves but no vomiting. His appetite  is increasing and wants Fish- tolerated fulls and supplements.     Subjective:    Patient expresses diarrhea  Patient denies difficulty swallowing and loss of appetite    Objective:    Vital signs in last 24 hours:  Temp:  [97.5 °F (36.4 °C)-98.2 °F (36.8 °C)] 97.6 °F (36.4 °C)  Heart Rate:  [74-84] 75  Resp:  [16-18] 16  BP: (137-164)/(65-74) 139/74    Intake/Output last 3 shifts:  I/O last 3 completed shifts:  In: 960 [P.O.:960]  Out: -   Intake/Output this shift:  I/O this shift:  In: 360 [P.O.:360]  Out: -     Physical Exam:Abdomen  Sounds Normal Active Bowel Sounds   Distension Soft   Tenderness Mildly Tender     Imaging Results (Last 72 Hours)       ** No results found for the last 72 hours. **            WBC   Date Value Ref Range Status   06/15/2024 14.49 (H) 3.40 - 10.80 10*3/mm3 Final     RBC   Date Value Ref Range Status   06/15/2024 4.17 4.14 - 5.80 10*6/mm3 Final     Hemoglobin   Date Value Ref Range Status   06/15/2024 12.2 (L) 13.0 - 17.7 g/dL Final     Hematocrit   Date Value Ref Range Status   06/15/2024 36.4 (L) 37.5 - 51.0 % Final     MCV   Date Value Ref Range Status   06/15/2024 87.3 79.0 - 97.0 fL Final     MCH   Date Value Ref Range Status   06/15/2024 29.3 26.6 - 33.0 pg Final     MCHC   Date Value Ref Range Status   06/15/2024 33.5 31.5 - 35.7 g/dL Final     RDW   Date Value Ref Range Status   06/15/2024 13.4 12.3 - 15.4 % Final     RDW-SD   Date Value Ref Range Status   06/15/2024 42.0 37.0 - 54.0 fl Final     MPV   Date Value Ref Range Status   06/15/2024 8.2 6.0 - 12.0 fL Final     Platelets    Date Value Ref Range Status   06/15/2024 495 (H) 140 - 450 10*3/mm3 Final     Neutrophil %   Date Value Ref Range Status   06/15/2024 73.9 42.7 - 76.0 % Final     Lymphocyte %   Date Value Ref Range Status   06/15/2024 11.7 (L) 19.6 - 45.3 % Final     Monocyte %   Date Value Ref Range Status   06/15/2024 7.5 5.0 - 12.0 % Final     Eosinophil %   Date Value Ref Range Status   06/15/2024 0.0 (L) 0.3 - 6.2 % Final     Basophil %   Date Value Ref Range Status   06/15/2024 0.6 0.0 - 1.5 % Final     Immature Grans %   Date Value Ref Range Status   06/15/2024 6.3 (H) 0.0 - 0.5 % Final     Neutrophils, Absolute   Date Value Ref Range Status   06/15/2024 10.72 (H) 1.70 - 7.00 10*3/mm3 Final     Lymphocytes, Absolute   Date Value Ref Range Status   06/15/2024 1.69 0.70 - 3.10 10*3/mm3 Final     Monocytes, Absolute   Date Value Ref Range Status   06/15/2024 1.08 (H) 0.10 - 0.90 10*3/mm3 Final     Eosinophils, Absolute   Date Value Ref Range Status   06/15/2024 0.00 0.00 - 0.40 10*3/mm3 Final     Basophils, Absolute   Date Value Ref Range Status   06/15/2024 0.08 0.00 - 0.20 10*3/mm3 Final     Immature Grans, Absolute   Date Value Ref Range Status   06/15/2024 0.92 (H) 0.00 - 0.05 10*3/mm3 Final     nRBC   Date Value Ref Range Status   06/15/2024 0.0 0.0 - 0.2 /100 WBC Final       Glucose   Date Value Ref Range Status   06/15/2024 134 (H) 65 - 99 mg/dL Final     Sodium   Date Value Ref Range Status   06/15/2024 134 (L) 136 - 145 mmol/L Final     Potassium   Date Value Ref Range Status   06/15/2024 4.2 3.5 - 5.2 mmol/L Final     CO2   Date Value Ref Range Status   06/15/2024 26.2 22.0 - 29.0 mmol/L Final     Chloride   Date Value Ref Range Status   06/15/2024 98 98 - 107 mmol/L Final     Anion Gap   Date Value Ref Range Status   06/15/2024 9.8 5.0 - 15.0 mmol/L Final     Creatinine   Date Value Ref Range Status   06/15/2024 0.69 (L) 0.76 - 1.27 mg/dL Final     BUN   Date Value Ref Range Status   06/15/2024 19 8 - 23 mg/dL  Final     BUN/Creatinine Ratio   Date Value Ref Range Status   06/15/2024 27.5 (H) 7.0 - 25.0 Final     Calcium   Date Value Ref Range Status   06/15/2024 8.1 (L) 8.6 - 10.5 mg/dL Final     Alkaline Phosphatase   Date Value Ref Range Status   06/15/2024 58 39 - 117 U/L Final     Total Protein   Date Value Ref Range Status   06/15/2024 5.2 (L) 6.0 - 8.5 g/dL Final     ALT (SGPT)   Date Value Ref Range Status   06/15/2024 11 1 - 41 U/L Final     AST (SGOT)   Date Value Ref Range Status   06/15/2024 14 1 - 40 U/L Final     Total Bilirubin   Date Value Ref Range Status   06/15/2024 0.3 0.0 - 1.2 mg/dL Final     Albumin   Date Value Ref Range Status   06/15/2024 2.7 (L) 3.5 - 5.2 g/dL Final     Globulin   Date Value Ref Range Status   06/15/2024 2.5 gm/dL Final     # Colitis per CT Imaging   # Chronic Diarrhea   # Unintentional Weight Loss      EGD- Ulcerative Esophagitis, erosive Gastritis, Duodenitis-Path Pending  Colon- Mod/Severe Ulcerative Colitis-path pending- pre biologic labs show negative HBV exposure, TB pending    Will decrease his IV steroids and switch to PO PPI in the AM

## 2024-06-16 PROCEDURE — 99232 SBSQ HOSP IP/OBS MODERATE 35: CPT | Performed by: STUDENT IN AN ORGANIZED HEALTH CARE EDUCATION/TRAINING PROGRAM

## 2024-06-16 PROCEDURE — 25010000002 METHYLPREDNISOLONE PER 125 MG: Performed by: INTERNAL MEDICINE

## 2024-06-16 PROCEDURE — 99232 SBSQ HOSP IP/OBS MODERATE 35: CPT | Performed by: INTERNAL MEDICINE

## 2024-06-16 RX ADMIN — METHYLPREDNISOLONE SODIUM SUCCINATE 60 MG: 125 INJECTION, POWDER, FOR SOLUTION INTRAMUSCULAR; INTRAVENOUS at 20:20

## 2024-06-16 RX ADMIN — SIMETHICONE 120 MG: 80 TABLET, CHEWABLE ORAL at 12:14

## 2024-06-16 RX ADMIN — PANTOPRAZOLE SODIUM 40 MG: 40 TABLET, DELAYED RELEASE ORAL at 17:58

## 2024-06-16 RX ADMIN — METHYLPREDNISOLONE SODIUM SUCCINATE 60 MG: 125 INJECTION, POWDER, FOR SOLUTION INTRAMUSCULAR; INTRAVENOUS at 08:26

## 2024-06-16 RX ADMIN — Medication 10 ML: at 08:27

## 2024-06-16 RX ADMIN — Medication 10 ML: at 20:20

## 2024-06-16 RX ADMIN — VALSARTAN 40 MG: 80 TABLET ORAL at 08:26

## 2024-06-16 RX ADMIN — SIMETHICONE 120 MG: 80 TABLET, CHEWABLE ORAL at 20:20

## 2024-06-16 RX ADMIN — SIMETHICONE 120 MG: 80 TABLET, CHEWABLE ORAL at 08:26

## 2024-06-16 RX ADMIN — PANTOPRAZOLE SODIUM 40 MG: 40 TABLET, DELAYED RELEASE ORAL at 08:26

## 2024-06-16 RX ADMIN — Medication 1 CAPSULE: at 08:26

## 2024-06-16 RX ADMIN — SIMETHICONE 120 MG: 80 TABLET, CHEWABLE ORAL at 17:58

## 2024-06-16 NOTE — PLAN OF CARE
Goal Outcome Evaluation:  Plan of Care Reviewed With: patient           Outcome Evaluation: VSS, B/P improved, tolerating diet , no c/o n/v, no c/o abdominal pain.                                Section O: Service Utilization    () Level of care in final 3 days  Complete only if , Reason for Discharge= 01       Did the patient receive Continuous Home Care, General Inpatient Care, or Respite      Care during any of the final 3 days of life?  Yes.     (if yes, then skip to , Signature(s) of Person(s) Completing the Record)

## 2024-06-16 NOTE — PROGRESS NOTES
GI Daily Progress Note    Assessment/Plan:      Colitis    Acute colitis    Chronic diarrhea    Unintentional weight loss    Severe malnutrition       LOS: 6 days     Mehnaz Thayer is a 84 y.o. male who was admitted with Colitis. He reports the symptoms are improving with treatment. Early Am BM today (0400) but continent qdn tolerating his low residue diet. Less sleep on day 3 of steroids. But no dyspepsia despite  his EGD findings    Subjective:    Patient expresses abdominal pain and diarrhea  Patient denies vomiting and bloody stools    Objective:    Vital signs in last 24 hours:  Temp:  [97.3 °F (36.3 °C)-98.4 °F (36.9 °C)] 98.2 °F (36.8 °C)  Heart Rate:  [72-88] 88  Resp:  [16-20] 20  BP: (136-169)/(67-80) 143/73    Intake/Output last 3 shifts:  I/O last 3 completed shifts:  In: 840 [P.O.:840]  Out: -   Intake/Output this shift:  No intake/output data recorded.    Physical Exam:Abdomen  Sounds Normal Active Bowel Sounds   Distension Soft   Tenderness Mildly Tender     Imaging Results (Last 72 Hours)       ** No results found for the last 72 hours. **            WBC   Date Value Ref Range Status   06/15/2024 14.49 (H) 3.40 - 10.80 10*3/mm3 Final     RBC   Date Value Ref Range Status   06/15/2024 4.17 4.14 - 5.80 10*6/mm3 Final     Hemoglobin   Date Value Ref Range Status   06/15/2024 12.2 (L) 13.0 - 17.7 g/dL Final     Hematocrit   Date Value Ref Range Status   06/15/2024 36.4 (L) 37.5 - 51.0 % Final     MCV   Date Value Ref Range Status   06/15/2024 87.3 79.0 - 97.0 fL Final     MCH   Date Value Ref Range Status   06/15/2024 29.3 26.6 - 33.0 pg Final     MCHC   Date Value Ref Range Status   06/15/2024 33.5 31.5 - 35.7 g/dL Final     RDW   Date Value Ref Range Status   06/15/2024 13.4 12.3 - 15.4 % Final     RDW-SD   Date Value Ref Range Status   06/15/2024 42.0 37.0 - 54.0 fl Final     MPV   Date Value Ref Range Status   06/15/2024 8.2 6.0 - 12.0 fL Final     Platelets   Date Value Ref Range Status   06/15/2024  495 (H) 140 - 450 10*3/mm3 Final     Neutrophil %   Date Value Ref Range Status   06/15/2024 73.9 42.7 - 76.0 % Final     Lymphocyte %   Date Value Ref Range Status   06/15/2024 11.7 (L) 19.6 - 45.3 % Final     Monocyte %   Date Value Ref Range Status   06/15/2024 7.5 5.0 - 12.0 % Final     Eosinophil %   Date Value Ref Range Status   06/15/2024 0.0 (L) 0.3 - 6.2 % Final     Basophil %   Date Value Ref Range Status   06/15/2024 0.6 0.0 - 1.5 % Final     Immature Grans %   Date Value Ref Range Status   06/15/2024 6.3 (H) 0.0 - 0.5 % Final     Neutrophils, Absolute   Date Value Ref Range Status   06/15/2024 10.72 (H) 1.70 - 7.00 10*3/mm3 Final     Lymphocytes, Absolute   Date Value Ref Range Status   06/15/2024 1.69 0.70 - 3.10 10*3/mm3 Final     Monocytes, Absolute   Date Value Ref Range Status   06/15/2024 1.08 (H) 0.10 - 0.90 10*3/mm3 Final     Eosinophils, Absolute   Date Value Ref Range Status   06/15/2024 0.00 0.00 - 0.40 10*3/mm3 Final     Basophils, Absolute   Date Value Ref Range Status   06/15/2024 0.08 0.00 - 0.20 10*3/mm3 Final     Immature Grans, Absolute   Date Value Ref Range Status   06/15/2024 0.92 (H) 0.00 - 0.05 10*3/mm3 Final     nRBC   Date Value Ref Range Status   06/15/2024 0.0 0.0 - 0.2 /100 WBC Final       Glucose   Date Value Ref Range Status   06/15/2024 134 (H) 65 - 99 mg/dL Final     Sodium   Date Value Ref Range Status   06/15/2024 134 (L) 136 - 145 mmol/L Final     Potassium   Date Value Ref Range Status   06/15/2024 4.2 3.5 - 5.2 mmol/L Final     CO2   Date Value Ref Range Status   06/15/2024 26.2 22.0 - 29.0 mmol/L Final     Chloride   Date Value Ref Range Status   06/15/2024 98 98 - 107 mmol/L Final     Anion Gap   Date Value Ref Range Status   06/15/2024 9.8 5.0 - 15.0 mmol/L Final     Creatinine   Date Value Ref Range Status   06/15/2024 0.69 (L) 0.76 - 1.27 mg/dL Final     BUN   Date Value Ref Range Status   06/15/2024 19 8 - 23 mg/dL Final     BUN/Creatinine Ratio   Date Value Ref  Range Status   06/15/2024 27.5 (H) 7.0 - 25.0 Final     Calcium   Date Value Ref Range Status   06/15/2024 8.1 (L) 8.6 - 10.5 mg/dL Final     Alkaline Phosphatase   Date Value Ref Range Status   06/15/2024 58 39 - 117 U/L Final     Total Protein   Date Value Ref Range Status   06/15/2024 5.2 (L) 6.0 - 8.5 g/dL Final     ALT (SGPT)   Date Value Ref Range Status   06/15/2024 11 1 - 41 U/L Final     AST (SGOT)   Date Value Ref Range Status   06/15/2024 14 1 - 40 U/L Final     Total Bilirubin   Date Value Ref Range Status   06/15/2024 0.3 0.0 - 1.2 mg/dL Final     Albumin   Date Value Ref Range Status   06/15/2024 2.7 (L) 3.5 - 5.2 g/dL Final     Globulin   Date Value Ref Range Status   06/15/2024 2.5 gm/dL Final     # Colitis per CT Imaging   # Chronic Diarrhea   # Unintentional Weight Loss      EGD- Ulcerative Esophagitis, erosive Gastritis, Duodenitis-Path Pending  Colon- Mod/Severe Ulcerative Colitis-path pending- pre biologic labs show negative HBV exposure, TB pending    Switching to all PO meds in the Am and still awaiting his Path and TB labs

## 2024-06-16 NOTE — PROGRESS NOTES
[facility] HOSPITALIST      Name: Mehnaz Thayer ADMIT: 2024   : 1940  PCP: Provider, No Known    MRN: [id number] LOS: 6 days   AGE/SEX: 84 y.o. male  ROOM: [facility]/   Chief Complaint diarrhea     Subjective   Subjective   Improved today, chaging     Review of Systems   All other systems reviewed and are negative.    As above     Objective   Objective   Vital Signs  Temp:  [97.3 °F (36.3 °C)-98.4 °F (36.9 °C)] 98.2 °F (36.8 °C)  Heart Rate:  [72-88] 88  Resp:  [16-20] 20  BP: (136-169)/(67-80) 143/73  SpO2:  [94 %-96 %] 95 %  on   ;   Device (Oxygen Therapy): room air  Body mass index is 23.26 kg/m².  Physical Exam  Vitals and nursing note reviewed.   Constitutional:       Appearance: Normal appearance.   HENT:      Head: Normocephalic and atraumatic.      Mouth/Throat:      Mouth: Mucous membranes are moist.      Pharynx: Oropharynx is clear.   Eyes:      Extraocular Movements: Extraocular movements intact.      Conjunctiva/sclera: Conjunctivae normal.   Cardiovascular:      Rate and Rhythm: Normal rate and regular rhythm.   Pulmonary:      Effort: Pulmonary effort is normal.      Breath sounds: Normal breath sounds.   Abdominal:      General: Abdomen is flat.      Palpations: Abdomen is soft.   Musculoskeletal:         General: Normal range of motion.   Skin:     General: Skin is warm and dry.   Neurological:      General: No focal deficit present.      Mental Status: He is alert and oriented to person, place, and time.         Results Review     I reviewed the patient's new clinical results.  Results from last 7 days   Lab Units 06/15/24  0616 24  0352 24  0402 06/10/24  0341   WBC 10*3/mm3 14.49* 5.25 10.03 14.16*   HEMOGLOBIN g/dL 12.2* 12.0* 11.1* 11.4*   PLATELETS 10*3/mm3 495* 482* 402 396     Results from last 7 days   Lab Units 06/15/24  0616 24  0351 24  0402 06/10/24  0341   SODIUM mmol/L 134* 133* 136 134*   POTASSIUM mmol/L 4.2 4.4 3.7 4.1   CHLORIDE  "mmol/L 98 97* 103 100   CO2 mmol/L 26.2 26.4 22.9 21.8*   BUN mg/dL 19 14 21 22   CREATININE mg/dL 0.69* 0.61* 0.59* 0.76   GLUCOSE mg/dL 134* 132* 111* 114*   Estimated Creatinine Clearance: 83.7 mL/min (A) (by C-G formula based on SCr of 0.69 mg/dL (L)).  Results from last 7 days   Lab Units 06/15/24  0616 06/14/24  0351 06/11/24  0402 06/09/24  1929   ALBUMIN g/dL 2.7* 2.9* 2.8* 3.4*   BILIRUBIN mg/dL 0.3 0.3 0.3 0.8   ALK PHOS U/L 58 59 87 87   AST (SGOT) U/L 14 17 14 12   ALT (SGPT) U/L 11 10 8 9     Results from last 7 days   Lab Units 06/15/24  0616 06/14/24  0351 06/11/24  0402 06/10/24  0341 06/09/24  1929   CALCIUM mg/dL 8.1* 8.3* 8.6 8.2* 8.9   ALBUMIN g/dL 2.7* 2.9* 2.8*  --  3.4*   MAGNESIUM mg/dL  --   --   --   --  2.3     Results from last 7 days   Lab Units 06/09/24 2026   LACTATE mmol/L 1.6   No results found for: \"COVID19\"  No results found for: \"HGBA1C\", \"POCGLU\"    CT Abdomen Pelvis With Contrast  Narrative: CT ABDOMEN PELVIS W CONTRAST    Date of Exam: 6/9/2024 8:46 PM EDT    Indication: diarrhea since Oct, weakness, loss of appetite, 40# weight loss, no known med hx.    Comparison: None available.    Technique: Axial CT images were obtained of the abdomen and pelvis following the uneventful intravenous administration of iodinated contrast. Sagittal and coronal reconstructions were performed.  Automated exposure control and iterative reconstruction   methods were used.    Findings:  Lung Bases:     The lung bases are clear. There is mild coronary artery calcific atherosclerosis.    Liver:  There is mild fatty infiltration of liver. There are no focal liver lesions. There is no intrahepatic biliary ductal dilatation.    Biliary/Gallbladder:    There is a large stone in the neck of the gallbladder. There is no pericholecystic fluid or gallbladder wall thickening. The biliary tree is nondilated.    Spleen:  Spleen is normal in size and CT density.    Pancreas:    Pancreas is normal. There is no " evidence of pancreatic mass or peripancreatic fluid.    Kidneys:    Kidneys are normal in size. There are no stones or hydronephrosis.    Adrenals:    Adrenal glands are unremarkable.    Retroperitoneal/Lymph Nodes/Vasculature:    No retroperitoneal adenopathy is identified.    Gastrointestinal/Mesentery:    There is an abnormal appearance of the colon. Primarily involving the ascending and transverse portions of the colon, there is wall thickening and a fluid-filled appearance of the colon with surrounding fat stranding consistent with colitis. The distal   colon also demonstrates some dilatation or wall thickening but without intraluminal fluid. The small bowel is unremarkable with a few fluid-filled loops but without evidence of inflammation. There is no free air. The appendix is dilated and fluid-filled   but appears to be without significant inflammatory change    Bladder:    The bladder is normal.    Genital:     Unremarkable          Bony Structures:     Visualized bony structures are consistent with the patient's age.  Impression: Impression:  Abnormal appearance of the colon. The ascending and transverse portions of the colon are fluid-filled and dilated with wall thickening and surrounding fat stranding consistent with colitis. The distal colon also demonstrates some wall thickening but   without intraluminal fluid. The appendix is dilated and fluid-filled but without significant inflammatory change.    Electronically Signed: Sigifredo Lee MD    6/9/2024 8:59 PM EDT    Workstation ID: [id number]  XR Chest 1 View  Narrative: XR CHEST 1 VW    Date of Exam: 6/9/2024 8:26 PM EDT    Indication: tachycardia, leukocytosis, no source    Comparison: None available.    Findings:  There are no airspace consolidations. No pleural fluid. No pneumothorax. The pulmonary vasculature appears within normal limits. The cardiac and mediastinal silhouette appear unremarkable. No acute osseous abnormality identified.  Impression:  Impression:  No active disease.    Electronically Signed: Sigifredo Lee MD    6/9/2024 8:50 PM EDT    Workstation ID: [id number]    I reviewed the patient's daily medications.  Scheduled Medications  lactobacillus acidophilus, 1 capsule, Oral, Daily  methylPREDNISolone sodium succinate, 60 mg, Intravenous, Q12H  pantoprazole, 40 mg, Oral, BID AC  [START ON 6/17/2024] predniSONE, 30 mg, Oral, BID With Meals  simethicone, 120 mg, Oral, 4x Daily AC & at Bedtime  sodium chloride, 10 mL, Intravenous, Q12H  valsartan, 40 mg, Oral, Q24H    Infusions   Diet  Diet: Gastrointestinal; Fiber-Restricted; Fluid Consistency: Thin (IDDSI 0)       Assessment/Plan     Active Hospital Problems    Diagnosis  POA    **Colitis [K52.9]  Yes    Severe malnutrition [E43]  Yes    Acute colitis [K52.9]  Yes    Chronic diarrhea [K52.9]  Yes    Unintentional weight loss [R63.4]  Yes      Resolved Hospital Problems   No resolved problems to display.       84 y.o. male admitted with Colitis.    Moderate to severe ulcerative colitis; GI following, weaning steroids with plan to transition to oral medications.  Advancing diet per GI.  Elevated BP without diagnosis of hypertension: suspect he truly does have some component of hypertension, continue valsartan.   Severe malnutrition: in the setting of inflammatory disease, nutrition following did not want supplements, encourage ongoing good diet.    Leukocytosis; in the setting  of IV steroids, do not suspect underlying infection.     SCDs for DVT prophylaxis.  Full code.  Discussed with patient, consulting provider, and CCP.  Anticipate discharge home in 1-2 days.      Diamond Henson MD  [facility] HOSPITALIST  06/16/24  10:42 EDT

## 2024-06-16 NOTE — PLAN OF CARE
Goal Outcome Evaluation:  Plan of Care Reviewed With: patient           Outcome Evaluation: VSS. Up ad viral in room and to bathroom without any problems. NO c/o pain. Slept majority of night.

## 2024-06-17 LAB
ALBUMIN SERPL-MCNC: 3.3 G/DL (ref 3.5–5.2)
ALBUMIN/GLOB SERPL: 1.4 G/DL
ALP SERPL-CCNC: 59 U/L (ref 39–117)
ALT SERPL W P-5'-P-CCNC: 21 U/L (ref 1–41)
ANION GAP SERPL CALCULATED.3IONS-SCNC: 7.1 MMOL/L (ref 5–15)
AST SERPL-CCNC: 20 U/L (ref 1–40)
BASOPHILS # BLD AUTO: 0.13 10*3/MM3 (ref 0–0.2)
BASOPHILS NFR BLD AUTO: 0.9 % (ref 0–1.5)
BILIRUB SERPL-MCNC: 0.4 MG/DL (ref 0–1.2)
BUN SERPL-MCNC: 20 MG/DL (ref 8–23)
BUN/CREAT SERPL: 26.3 (ref 7–25)
CALCIUM SPEC-SCNC: 8.5 MG/DL (ref 8.6–10.5)
CALPROTECTIN STL-MCNT: 5440 UG/G (ref 0–120)
CHLORIDE SERPL-SCNC: 100 MMOL/L (ref 98–107)
CO2 SERPL-SCNC: 27.9 MMOL/L (ref 22–29)
CREAT SERPL-MCNC: 0.76 MG/DL (ref 0.76–1.27)
DEPRECATED RDW RBC AUTO: 45.1 FL (ref 37–54)
EGFRCR SERPLBLD CKD-EPI 2021: 88.1 ML/MIN/1.73
EOSINOPHIL # BLD AUTO: 0.01 10*3/MM3 (ref 0–0.4)
EOSINOPHIL NFR BLD AUTO: 0.1 % (ref 0.3–6.2)
ERYTHROCYTE [DISTWIDTH] IN BLOOD BY AUTOMATED COUNT: 14.1 % (ref 12.3–15.4)
GLOBULIN UR ELPH-MCNC: 2.4 GM/DL
GLUCOSE SERPL-MCNC: 144 MG/DL (ref 65–99)
HCT VFR BLD AUTO: 38.2 % (ref 37.5–51)
HGB BLD-MCNC: 12.7 G/DL (ref 13–17.7)
IMM GRANULOCYTES # BLD AUTO: 1.39 10*3/MM3 (ref 0–0.05)
IMM GRANULOCYTES NFR BLD AUTO: 9.9 % (ref 0–0.5)
LYMPHOCYTES # BLD AUTO: 1.74 10*3/MM3 (ref 0.7–3.1)
LYMPHOCYTES NFR BLD AUTO: 12.4 % (ref 19.6–45.3)
MCH RBC QN AUTO: 29.5 PG (ref 26.6–33)
MCHC RBC AUTO-ENTMCNC: 33.2 G/DL (ref 31.5–35.7)
MCV RBC AUTO: 88.8 FL (ref 79–97)
MONOCYTES # BLD AUTO: 0.51 10*3/MM3 (ref 0.1–0.9)
MONOCYTES NFR BLD AUTO: 3.6 % (ref 5–12)
NEUTROPHILS NFR BLD AUTO: 10.29 10*3/MM3 (ref 1.7–7)
NEUTROPHILS NFR BLD AUTO: 73.1 % (ref 42.7–76)
NRBC BLD AUTO-RTO: 0 /100 WBC (ref 0–0.2)
PLATELET # BLD AUTO: 482 10*3/MM3 (ref 140–450)
PMV BLD AUTO: 8.4 FL (ref 6–12)
POTASSIUM SERPL-SCNC: 4.5 MMOL/L (ref 3.5–5.2)
PROT SERPL-MCNC: 5.7 G/DL (ref 6–8.5)
RBC # BLD AUTO: 4.3 10*6/MM3 (ref 4.14–5.8)
SODIUM SERPL-SCNC: 135 MMOL/L (ref 136–145)
WBC NRBC COR # BLD AUTO: 14.07 10*3/MM3 (ref 3.4–10.8)

## 2024-06-17 PROCEDURE — 80053 COMPREHEN METABOLIC PANEL: CPT | Performed by: INTERNAL MEDICINE

## 2024-06-17 PROCEDURE — 99232 SBSQ HOSP IP/OBS MODERATE 35: CPT | Performed by: INTERNAL MEDICINE

## 2024-06-17 PROCEDURE — 63710000001 PREDNISONE PER 1 MG: Performed by: INTERNAL MEDICINE

## 2024-06-17 PROCEDURE — 63710000001 PREDNISONE PER 5 MG: Performed by: INTERNAL MEDICINE

## 2024-06-17 PROCEDURE — 85025 COMPLETE CBC W/AUTO DIFF WBC: CPT | Performed by: STUDENT IN AN ORGANIZED HEALTH CARE EDUCATION/TRAINING PROGRAM

## 2024-06-17 PROCEDURE — 99232 SBSQ HOSP IP/OBS MODERATE 35: CPT | Performed by: STUDENT IN AN ORGANIZED HEALTH CARE EDUCATION/TRAINING PROGRAM

## 2024-06-17 RX ADMIN — SIMETHICONE 120 MG: 80 TABLET, CHEWABLE ORAL at 20:08

## 2024-06-17 RX ADMIN — Medication 10 ML: at 08:23

## 2024-06-17 RX ADMIN — VALSARTAN 40 MG: 80 TABLET ORAL at 08:23

## 2024-06-17 RX ADMIN — SIMETHICONE 120 MG: 80 TABLET, CHEWABLE ORAL at 11:53

## 2024-06-17 RX ADMIN — SIMETHICONE 120 MG: 80 TABLET, CHEWABLE ORAL at 16:53

## 2024-06-17 RX ADMIN — Medication 10 ML: at 20:09

## 2024-06-17 RX ADMIN — PANTOPRAZOLE SODIUM 40 MG: 40 TABLET, DELAYED RELEASE ORAL at 16:53

## 2024-06-17 RX ADMIN — PREDNISONE 30 MG: 20 TABLET ORAL at 11:53

## 2024-06-17 RX ADMIN — PANTOPRAZOLE SODIUM 40 MG: 40 TABLET, DELAYED RELEASE ORAL at 08:22

## 2024-06-17 RX ADMIN — PREDNISONE 30 MG: 20 TABLET ORAL at 08:22

## 2024-06-17 RX ADMIN — SIMETHICONE 120 MG: 80 TABLET, CHEWABLE ORAL at 08:23

## 2024-06-17 RX ADMIN — Medication 1 CAPSULE: at 08:22

## 2024-06-17 NOTE — PROGRESS NOTES
"SERVICE: [facility] HOSPITALIST    CONSULTANTS: GI    CHIEF COMPLAINT: Abdominal pain    SUBJECTIVE: Patient seen and examined at bedside. Patient reports he is doing very well this morning and has no acute complaints.  He denies any abdominal pain, and expresses appreciation towards GI Dr. He is eating breakfast without issue.  No other acute issues reported.     OBJECTIVE:    Physical exam is largely unchanged from previous exam, except where documented below, examination is accurate as of 6/17/2024    Physical Exam:  General: Patient is awake and alert.  Elderly male. No acute distress noted.   HENT: Head is atraumatic, normocephalic. Hearing is grossly intact. Nose is without obvious congestion and appears patent. Neck is supple and trachea is midline.   Eyes: Vision is grossly intact. Pupils appear equal and round.   Cardiovascular: Heart has regular rate and rhythm with no murmurs, rubs or gallops noted.   Respiratory: Lungs are clear to ausculation without wheezes, rhonchi or rales.   Abdominal/GI: Soft, nontender, bowel sounds present. No rebound or guarding present.   Extremities: No peripheral edema noted.   Musculoskeletal: Spontaneous movement of bilateral upper and lower extremities against gravity noted. No signs of injury or deformity noted.   Skin: Warm and dry.   Psych: Mood and affect are appropriate. Cooperative with exam.   Neuro: No facial asymmetry noted. No focal deficits noted, hearing and vision are grossly intact.     /75   Pulse 76   Temp 97.4 °F (36.3 °C) (Oral)   Resp 18   Ht 180.3 cm (70.98\")   Wt 71.5 kg (157 lb 9.6 oz)   SpO2 95%   BMI 21.99 kg/m²     MEDS/LABS REVIEWED AND ORDERED    lactobacillus acidophilus, 1 capsule, Oral, Daily  pantoprazole, 40 mg, Oral, BID AC  predniSONE, 30 mg, Oral, BID With Meals  simethicone, 120 mg, Oral, 4x Daily AC & at Bedtime  sodium chloride, 10 mL, Intravenous, Q12H  valsartan, 40 mg, Oral, " Q24H          LAB/DIAGNOSTICS:    Lab Results (last 24 hours)       Procedure Component Value Units Date/Time    Comprehensive Metabolic Panel [[id number]]  (Abnormal) Collected: 06/17/24 0409    Specimen: Blood Updated: 06/17/24 0454     Glucose 144 mg/dL      BUN 20 mg/dL      Creatinine 0.76 mg/dL      Sodium 135 mmol/L      Potassium 4.5 mmol/L      Chloride 100 mmol/L      CO2 27.9 mmol/L      Calcium 8.5 mg/dL      Total Protein 5.7 g/dL      Albumin 3.3 g/dL      ALT (SGPT) 21 U/L      AST (SGOT) 20 U/L      Alkaline Phosphatase 59 U/L      Total Bilirubin 0.4 mg/dL      Globulin 2.4 gm/dL      A/G Ratio 1.4 g/dL      BUN/Creatinine Ratio 26.3     Anion Gap 7.1 mmol/L      eGFR 88.1 mL/min/1.73     Narrative:      GFR Normal >60  Chronic Kidney Disease <60  Kidney Failure <15    The GFR formula is only valid for adults with stable renal function between ages 18 and 70.    CBC & Differential [[id number]]  (Abnormal) Collected: 06/17/24 0409    Specimen: Blood Updated: 06/17/24 0433    Narrative:      The following orders were created for panel order CBC & Differential.  Procedure                               Abnormality         Status                     ---------                               -----------         ------                     CBC Auto Differential[[id number]]        Abnormal            Final result               Scan Slide[[id number]]                                                                    Please view results for these tests on the individual orders.    CBC Auto Differential [[id number]]  (Abnormal) Collected: 06/17/24 0409    Specimen: Blood Updated: 06/17/24 0433     WBC 14.07 10*3/mm3      RBC 4.30 10*6/mm3      Hemoglobin 12.7 g/dL      Hematocrit 38.2 %      MCV 88.8 fL      MCH 29.5 pg      MCHC 33.2 g/dL      RDW 14.1 %      RDW-SD 45.1 fl      MPV 8.4 fL      Platelets 482 10*3/mm3      Neutrophil % 73.1 %      Lymphocyte % 12.4 %      Monocyte % 3.6 %      Eosinophil % 0.1 %       Basophil % 0.9 %      Immature Grans % 9.9 %      Neutrophils, Absolute 10.29 10*3/mm3      Lymphocytes, Absolute 1.74 10*3/mm3      Monocytes, Absolute 0.51 10*3/mm3      Eosinophils, Absolute 0.01 10*3/mm3      Basophils, Absolute 0.13 10*3/mm3      Immature Grans, Absolute 1.39 10*3/mm3      nRBC 0.0 /100 WBC           ECG 12 Lead ED Triage Standing Order; Weak / Dizzy / AMS   Final Result   HEART RATE= 98  bpm   RR Interval= 612  ms   PA Interval=   ms   P Horizontal Axis=   deg   P Front Axis=   deg   QRSD Interval= 99  ms   QT Interval= 348  ms   QTcB= 445  ms   QRS Axis= -23  deg   T Wave Axis= 34  deg   - ABNORMAL ECG -   Atrial fibrillation   Borderline left axis deviation   NO PRIOR TRACING AVAILABLE FOR COMPARISON   Electronically Signed By: Lit Duke ([facility], [place]) 10-Bruce-2024 17:12:01   Date and Time of Study: 2024-06-09 19:25:51          No radiology results for the last day      ASSESSMENT/PLAN:  Please note portions of this assessment/plan may have been copied and pasted, but I have personally seen this patient and reviewed each line of this assessment and plan for accuracy and made updates to reflect my necessary changes on 6/17/2024    Moderate to severe ulcerative colitis  -GI following-have switched all meds to p.o., still awaiting path after EGD showed ulcerative esophagitis, erosive gastritis, duodenitis  -Advancing diet as per GI    Hypertension-continue valsartan    Severe malnutrition-nutrition following, encourage good oral intake    Leukocytosis-in the setting of IV steroids do not suspect underlying infection      PLAN FOR DISPOSITION: AURA Almaguer DO  06/17/24  08:47 EDT    At [facility], we believe that sharing information builds trust and better relationships. You are receiving this note because you recently visited [facility]. It is possible you will see health information before a provider has talked with you about it. This kind of information can be easy to  "misunderstand. To help you fully understand what it means for your health, we urge you to discuss this note with your provider.    \"Dictated utilizing Dragon dictation\"    "

## 2024-06-17 NOTE — PLAN OF CARE
Goal Outcome Evaluation:  Plan of Care Reviewed With: patient        Progress: improving  Outcome Evaluation: VSS, tolerating diet, no c/o n/v or pain.

## 2024-06-17 NOTE — PROGRESS NOTES
GI Daily Progress Note       LOS: 7 days       Subjective:    - Reports continued improvement in the frequency of diarrhea -- had 6 bowel movements total yesterday per patient. Reports improvement in the consistency of his stools.   - Denies overt GI bleeding.   - Tolerating diet well without N/V.   - HBcAb negative.     Objective:    Vital signs in last 24 hours:  Temp:  [97.3 °F (36.3 °C)-97.9 °F (36.6 °C)] 97.4 °F (36.3 °C)  Heart Rate:  [63-88] 76  Resp:  [18] 18  BP: (125-160)/(66-83) 151/75    Intake/Output last 3 shifts:  I/O last 3 completed shifts:  In: 540 [P.O.:540]  Out: -   Intake/Output this shift:  I/O this shift:  In: 240 [P.O.:240]  Out: -     Physical Exam:Abdomen  Sounds Normal Active Bowel Sounds   Distension Soft   Tenderness Nontender     Imaging Results (Last 72 Hours)       ** No results found for the last 72 hours. **            WBC   Date Value Ref Range Status   06/17/2024 14.07 (H) 3.40 - 10.80 10*3/mm3 Final     RBC   Date Value Ref Range Status   06/17/2024 4.30 4.14 - 5.80 10*6/mm3 Final     Hemoglobin   Date Value Ref Range Status   06/17/2024 12.7 (L) 13.0 - 17.7 g/dL Final     Hematocrit   Date Value Ref Range Status   06/17/2024 38.2 37.5 - 51.0 % Final     MCV   Date Value Ref Range Status   06/17/2024 88.8 79.0 - 97.0 fL Final     MCH   Date Value Ref Range Status   06/17/2024 29.5 26.6 - 33.0 pg Final     MCHC   Date Value Ref Range Status   06/17/2024 33.2 31.5 - 35.7 g/dL Final     RDW   Date Value Ref Range Status   06/17/2024 14.1 12.3 - 15.4 % Final     RDW-SD   Date Value Ref Range Status   06/17/2024 45.1 37.0 - 54.0 fl Final     MPV   Date Value Ref Range Status   06/17/2024 8.4 6.0 - 12.0 fL Final     Platelets   Date Value Ref Range Status   06/17/2024 482 (H) 140 - 450 10*3/mm3 Final     Neutrophil %   Date Value Ref Range Status   06/17/2024 73.1 42.7 - 76.0 % Final     Lymphocyte %   Date Value Ref Range Status   06/17/2024 12.4 (L) 19.6 - 45.3 % Final     Monocyte  %   Date Value Ref Range Status   06/17/2024 3.6 (L) 5.0 - 12.0 % Final     Eosinophil %   Date Value Ref Range Status   06/17/2024 0.1 (L) 0.3 - 6.2 % Final     Basophil %   Date Value Ref Range Status   06/17/2024 0.9 0.0 - 1.5 % Final     Immature Grans %   Date Value Ref Range Status   06/17/2024 9.9 (H) 0.0 - 0.5 % Final     Neutrophils, Absolute   Date Value Ref Range Status   06/17/2024 10.29 (H) 1.70 - 7.00 10*3/mm3 Final     Lymphocytes, Absolute   Date Value Ref Range Status   06/17/2024 1.74 0.70 - 3.10 10*3/mm3 Final     Monocytes, Absolute   Date Value Ref Range Status   06/17/2024 0.51 0.10 - 0.90 10*3/mm3 Final     Eosinophils, Absolute   Date Value Ref Range Status   06/17/2024 0.01 0.00 - 0.40 10*3/mm3 Final     Basophils, Absolute   Date Value Ref Range Status   06/17/2024 0.13 0.00 - 0.20 10*3/mm3 Final     Immature Grans, Absolute   Date Value Ref Range Status   06/17/2024 1.39 (H) 0.00 - 0.05 10*3/mm3 Final     nRBC   Date Value Ref Range Status   06/17/2024 0.0 0.0 - 0.2 /100 WBC Final       Glucose   Date Value Ref Range Status   06/17/2024 144 (H) 65 - 99 mg/dL Final     Sodium   Date Value Ref Range Status   06/17/2024 135 (L) 136 - 145 mmol/L Final     Potassium   Date Value Ref Range Status   06/17/2024 4.5 3.5 - 5.2 mmol/L Final     CO2   Date Value Ref Range Status   06/17/2024 27.9 22.0 - 29.0 mmol/L Final     Chloride   Date Value Ref Range Status   06/17/2024 100 98 - 107 mmol/L Final     Anion Gap   Date Value Ref Range Status   06/17/2024 7.1 5.0 - 15.0 mmol/L Final     Creatinine   Date Value Ref Range Status   06/17/2024 0.76 0.76 - 1.27 mg/dL Final     BUN   Date Value Ref Range Status   06/17/2024 20 8 - 23 mg/dL Final     BUN/Creatinine Ratio   Date Value Ref Range Status   06/17/2024 26.3 (H) 7.0 - 25.0 Final     Calcium   Date Value Ref Range Status   06/17/2024 8.5 (L) 8.6 - 10.5 mg/dL Final     Alkaline Phosphatase   Date Value Ref Range Status   06/17/2024 59 39 - 117 U/L  Final     Total Protein   Date Value Ref Range Status   06/17/2024 5.7 (L) 6.0 - 8.5 g/dL Final     ALT (SGPT)   Date Value Ref Range Status   06/17/2024 21 1 - 41 U/L Final     AST (SGOT)   Date Value Ref Range Status   06/17/2024 20 1 - 40 U/L Final     Total Bilirubin   Date Value Ref Range Status   06/17/2024 0.4 0.0 - 1.2 mg/dL Final     Albumin   Date Value Ref Range Status   06/17/2024 3.3 (L) 3.5 - 5.2 g/dL Final     Globulin   Date Value Ref Range Status   06/17/2024 2.4 gm/dL Final       Problem List:      Colitis    Acute colitis    Chronic diarrhea    Unintentional weight loss    Severe malnutrition      Assessment and Plan     83 yo M with no significant PMH presented on 6/9 with worsening chronic diarrhea and unintentional weight loss. Admitted for colitis per CT imaging. GI consulted for further evaluation.    # Ulcerative Pancolitis   -  C/s on 6/13 showed moderate to severe inflammation extending from rectum to cecum. Path pending   - HBcAb total negative   Plan:   - Continue Prednisone taper.    - Obtain TB Quantiferon Gold Plus   - Plan to start Remicade for maintenance. Will give first dose tomorrow     # GERD   # Gastritis   - Noted on EGD from 6/13  - Continue Protonix 40 mg BID

## 2024-06-17 NOTE — PROGRESS NOTES
Adult Nutrition  Assessment/PES    Patient Name:  Mehnaz Thayer  YOB: 1940  MRN: [id number]  Admit Date:  6/9/2024    Assessment Date:  6/17/2024    Comments:  Agree with diet and supplement.  Edu as below.    Tolerating po 75% ave of meals recorded.    Will cont to follow and monitor.      Reason for Assessment       Row Name 06/17/24 1202          Reason for Assessment    Reason For Assessment follow-up protocol     Diagnosis gastrointestinal disease  colitis, diarrhea , wt loss s/p EGD, c-scope: ulcerative colitis, esophagitis, gastritis, duodenitis     Identified At Risk by Screening Criteria MST SCORE 2+                    Nutrition/Diet History       Row Name 06/17/24 1202          Nutrition/Diet History    Typical Intake (Food/Fluid/EN/PN) Pt & wife holding hands at visit. Pt hoping for steak and cooked greens when he goes home. Pt loves salad and arroyo beans but agreeable to avoid until GI gives ok.                    Labs/Tests/Procedures/Meds       Row Name 06/17/24 1203          Labs/Procedures/Meds    Lab Results Reviewed reviewed     Lab Results Comments glu 144        Diagnostic Tests/Procedures    Diagnostic Test/Procedure Reviewed reviewed        Medications    Pertinent Medications Reviewed reviewed     Pertinent Medications Comments prednisone, mylicaon, risaquad                      Estimated/Assessed Needs - Anthropometrics       Row Name 06/17/24 0500          Anthropometrics    Weight 71.5 kg (157 lb 9.6 oz)                    Nutrition Prescription Ordered       Row Name 06/17/24 1203          Nutrition Prescription PO    Supplement Boost Plus (Ensure Enlive, Ensure Plus)     Supplement Frequency 2 times a day     Common Modifiers Other (comment)  Fiber Restricted                    Evaluation of Received Nutrient/Fluid Intake       Row Name 06/17/24 1203          Fluid Intake Evaluation    Oral Fluid (mL) 540  ave x 3, 24%        PO Evaluation    Number of Meals 6     % PO  Intake 75                       Problem/Interventions:   Problem 1       Row Name 06/17/24 1204          Nutrition Diagnoses Problem 1    Problem 1 Other (comment)  Severe chronic disease related malnutrition related to chronic on acute diarrhea as evidenced by less 75% of est energy requirement >= 1 month, >7.5% wt loss in past 3 months, severe muscle wasting and fat loss on exam.                          Intervention Goal       Row Name 06/17/24 1204          Intervention Goal    General Meet nutritional needs for age/condition     PO Tolerate PO;PO intake (%)     PO Intake % 75 %  or greater                    Nutrition Intervention       Row Name 06/17/24 1204          Nutrition Intervention    RD/Tech Action Follow Tx progress                      Education/Evaluation       Row Name 06/17/24 1204          Education    Education Education topics;Provided education regarding;Advised regarding habits/behavior  Edu on Fiber Restricted Diet for healing. Edu avoid dry beans, whole grains, seeds/nuts, fresh fruit ( melon - no seed/peel, banana ok), fresh veggies. Cooked/canned fruit/veggies ok. Limit fried and spicy as well as acidic foods.     Provided education regarding Diet rationale     Education Topics Fiber  Fiber Restricted  Nutrition Therapy provided w RD contact     Advised Regarding Habits/Behavior Food choices        Monitor/Evaluation    Monitor Per protocol;I&O;PO intake;Pertinent labs;Weight;Symptoms     Education Follow-up Other (comment)  pt & wife verbalize understanding                     Electronically signed by:  Jil Gonzalez RD  06/17/24 12:08 EDT

## 2024-06-17 NOTE — CASE MANAGEMENT/SOCIAL WORK
Continued Stay Note  ANGELO Hassna     Patient Name: Mehnaz Thayer  MRN: [id number]  Today's Date: 6/17/2024    Admit Date: 6/9/2024    Plan: Plan home with wife   Discharge Plan       Row Name 06/17/24 1050       Plan    Plan Plan home with wife    Patient/Family in Agreement with Plan yes    Plan Comments Follow up visit with patient, he is talkative and in good spirits this am. Verified he plans to return home with his wife to assist as needed. Discussion of SDOH. No needs noted. CM# remains on white board, will continue to follow to assist with dc needs.                   Discharge Codes    No documentation.                 Expected Discharge Date and Time       Expected Discharge Date Expected Discharge Time    Jun 18, 2024               Maxim Mcconnell RN

## 2024-06-18 ENCOUNTER — READMISSION MANAGEMENT (OUTPATIENT)
Dept: CALL CENTER | Facility: HOSPITAL | Age: 84
End: 2024-06-18
Payer: MEDICARE

## 2024-06-18 ENCOUNTER — TELEPHONE (OUTPATIENT)
Dept: GASTROENTEROLOGY | Facility: CLINIC | Age: 84
End: 2024-06-18
Payer: MEDICARE

## 2024-06-18 VITALS
RESPIRATION RATE: 16 BRPM | HEART RATE: 80 BPM | DIASTOLIC BLOOD PRESSURE: 62 MMHG | OXYGEN SATURATION: 96 % | BODY MASS INDEX: 22.26 KG/M2 | TEMPERATURE: 97.9 F | WEIGHT: 159 LBS | HEIGHT: 71 IN | SYSTOLIC BLOOD PRESSURE: 128 MMHG

## 2024-06-18 DIAGNOSIS — K51.00 ULCERATIVE PANCOLITIS: Primary | ICD-10-CM

## 2024-06-18 LAB
GAMMA INTERFERON BACKGROUND BLD IA-ACNC: 0.03 IU/ML
LAB AP CASE REPORT: NORMAL
LAB AP SPECIAL STAINS: NORMAL
M TB IFN-G BLD-IMP: NEGATIVE
M TB IFN-G CD4+ BCKGRND COR BLD-ACNC: 0.02 IU/ML
M TB IFN-G CD4+CD8+ BCKGRND COR BLD-ACNC: 0.04 IU/ML
MITOGEN IGNF BCKGRD COR BLD-ACNC: 1.09 IU/ML
PATH REPORT.FINAL DX SPEC: NORMAL
PATH REPORT.GROSS SPEC: NORMAL
QUANTIFERON INCUBATION: NORMAL
SERVICE CMNT-IMP: NORMAL

## 2024-06-18 PROCEDURE — 63710000001 PREDNISONE PER 5 MG: Performed by: INTERNAL MEDICINE

## 2024-06-18 PROCEDURE — 63710000001 PREDNISONE PER 1 MG: Performed by: INTERNAL MEDICINE

## 2024-06-18 PROCEDURE — 25810000003 SODIUM CHLORIDE 0.9 % SOLUTION 250 ML FLEX CONT: Performed by: STUDENT IN AN ORGANIZED HEALTH CARE EDUCATION/TRAINING PROGRAM

## 2024-06-18 PROCEDURE — 25010000002 INFLIXIMAB-ABDA 100 MG RECONSTITUTED SOLUTION 1 EACH VIAL: Performed by: STUDENT IN AN ORGANIZED HEALTH CARE EDUCATION/TRAINING PROGRAM

## 2024-06-18 PROCEDURE — 99239 HOSP IP/OBS DSCHRG MGMT >30: CPT | Performed by: INTERNAL MEDICINE

## 2024-06-18 RX ORDER — SODIUM CHLORIDE 9 MG/ML
20 INJECTION, SOLUTION INTRAVENOUS ONCE
OUTPATIENT
Start: 2024-06-18

## 2024-06-18 RX ORDER — BUDESONIDE 9 MG/1
9 TABLET, FILM COATED, EXTENDED RELEASE ORAL DAILY
Qty: 30 TABLET | Refills: 0 | Status: SHIPPED | OUTPATIENT
Start: 2024-06-18 | End: 2024-06-18

## 2024-06-18 RX ORDER — VALSARTAN 40 MG/1
40 TABLET ORAL
Qty: 30 TABLET | Refills: 0 | Status: SHIPPED | OUTPATIENT
Start: 2024-06-19 | End: 2024-06-18

## 2024-06-18 RX ORDER — VALSARTAN 40 MG/1
40 TABLET ORAL
Qty: 30 TABLET | Refills: 0 | Status: SHIPPED | OUTPATIENT
Start: 2024-06-19

## 2024-06-18 RX ORDER — PANTOPRAZOLE SODIUM 40 MG/1
40 TABLET, DELAYED RELEASE ORAL
Qty: 60 TABLET | Refills: 0 | Status: SHIPPED | OUTPATIENT
Start: 2024-06-18

## 2024-06-18 RX ORDER — BUDESONIDE 3 MG/1
9 CAPSULE, COATED PELLETS ORAL EVERY MORNING
Qty: 90 CAPSULE | Refills: 2 | Status: SHIPPED | OUTPATIENT
Start: 2024-06-18 | End: 2024-09-16

## 2024-06-18 RX ORDER — ONDANSETRON 4 MG/1
4 TABLET, ORALLY DISINTEGRATING ORAL EVERY 6 HOURS PRN
Qty: 20 TABLET | Refills: 0 | Status: SHIPPED | OUTPATIENT
Start: 2024-06-18

## 2024-06-18 RX ORDER — SIMETHICONE 80 MG
120 TABLET,CHEWABLE ORAL
Qty: 120 TABLET | Refills: 0 | Status: SHIPPED | OUTPATIENT
Start: 2024-06-18

## 2024-06-18 RX ADMIN — VALSARTAN 40 MG: 80 TABLET ORAL at 08:27

## 2024-06-18 RX ADMIN — PANTOPRAZOLE SODIUM 40 MG: 40 TABLET, DELAYED RELEASE ORAL at 08:28

## 2024-06-18 RX ADMIN — PREDNISONE 30 MG: 20 TABLET ORAL at 11:42

## 2024-06-18 RX ADMIN — PREDNISONE 30 MG: 20 TABLET ORAL at 08:28

## 2024-06-18 RX ADMIN — SIMETHICONE 120 MG: 80 TABLET, CHEWABLE ORAL at 11:41

## 2024-06-18 RX ADMIN — INFLIXIMAB 360.5 MG: 100 INJECTION, POWDER, LYOPHILIZED, FOR SOLUTION INTRAVENOUS at 11:42

## 2024-06-18 RX ADMIN — Medication 1 CAPSULE: at 08:28

## 2024-06-18 RX ADMIN — Medication 10 ML: at 08:28

## 2024-06-18 RX ADMIN — SIMETHICONE 120 MG: 80 TABLET, CHEWABLE ORAL at 08:27

## 2024-06-18 NOTE — DISCHARGE SUMMARY
Mehnaz Thayer  1940  [id number]    Hospitalists Discharge Summary    Date of Admission: 6/9/2024  Date of Discharge:  6/18/2024    History of Present Illness from [facility] on admit: 85 y/o male with no significant PMHx, presents to [facility] ED for evaluation of diarrhea and diminished appetite.  Patient says he started having diarrhea in October of last year.  It occurred intermittently over the last several months.  He has had diminished appetite and has lost about 30-40 lbs over this time.  Over the last few days, he had more frequent watery diarrhea and has had little appetite.  He denies any dark or red stools, nausea, or vomiting.  He denies any recent antibiotic use.     Primary Discharge diagnoses: Moderate to severe ulcerative colitis-has improved on steroid taper, TB testing negative which alone initial dose of Remicade to be prescribed, patient tolerated well and will be on Remicade maintenance dosing as well as budesonide 9 mg daily, he will follow-up closely with GI on discharge.     Secondary Discharge Diagnoses: Hypertension-now stable on valsartan daily.  Severe malnutrition-encourage good oral intake and nutrition followed while inpatient.  Leukocytosis-known side effect of steroid use, no infection suspected.     Hospital Course Summary: Patient was admitted for moderate to severe ulcerative colitis, EGD was performed and biopsies were performed as well please see path report, he will follow-up closely with GI on discharge.  While hospitalized patient responded very well to prednisone taper.  He underwent quant of Farren gold testing for TB which was negative this allowed use of Remicade while hospitalized, he will now initiate maintenance dosing with this as per GI and will remain on budesonide 9 mg daily. On 6/18/2024 patient's condition had improved. They were deemed stable for discharge. They were advised to take all medications as prescribed, follow up with PCP within 1 week. If there  are any issues patient should contact PCP and/or return to the ED for follow up. Patient was agreeable to the plan and subsequently discharged at this time.     PCP  Patient Care Team:  Provider, No Known as PCP - General    Consults:   Consults       Date and Time Order Name Status Description    6/9/2024 11:36 PM Inpatient Gastroenterology Consult Completed             Operations and Procedures Performed:  Procedure(s):  ESOPHAGOGASTRODUODENOSCOPY WITH BIOPSY  COLONOSCOPY WITH BIOPSY     CT Abdomen Pelvis With Contrast    Result Date: 6/9/2024  Narrative: CT ABDOMEN PELVIS W CONTRAST Date of Exam: 6/9/2024 8:46 PM EDT Indication: diarrhea since Oct, weakness, loss of appetite, 40# weight loss, no known med hx. Comparison: None available. Technique: Axial CT images were obtained of the abdomen and pelvis following the uneventful intravenous administration of iodinated contrast. Sagittal and coronal reconstructions were performed.  Automated exposure control and iterative reconstruction methods were used. Findings: Lung Bases:   The lung bases are clear. There is mild coronary artery calcific atherosclerosis. Liver: There is mild fatty infiltration of liver. There are no focal liver lesions. There is no intrahepatic biliary ductal dilatation. Biliary/Gallbladder:  There is a large stone in the neck of the gallbladder. There is no pericholecystic fluid or gallbladder wall thickening. The biliary tree is nondilated. Spleen: Spleen is normal in size and CT density. Pancreas:  Pancreas is normal. There is no evidence of pancreatic mass or peripancreatic fluid. Kidneys:  Kidneys are normal in size. There are no stones or hydronephrosis. Adrenals:  Adrenal glands are unremarkable. Retroperitoneal/Lymph Nodes/Vasculature:  No retroperitoneal adenopathy is identified. Gastrointestinal/Mesentery:  There is an abnormal appearance of the colon. Primarily involving the ascending and transverse portions of the colon, there is  wall thickening and a fluid-filled appearance of the colon with surrounding fat stranding consistent with colitis. The distal colon also demonstrates some dilatation or wall thickening but without intraluminal fluid. The small bowel is unremarkable with a few fluid-filled loops but without evidence of inflammation. There is no free air. The appendix is dilated and fluid-filled but appears to be without significant inflammatory change Bladder:  The bladder is normal. Genital:   Unremarkable       Bony Structures:   Visualized bony structures are consistent with the patient's age.     Impression: Impression: Abnormal appearance of the colon. The ascending and transverse portions of the colon are fluid-filled and dilated with wall thickening and surrounding fat stranding consistent with colitis. The distal colon also demonstrates some wall thickening but without intraluminal fluid. The appendix is dilated and fluid-filled but without significant inflammatory change. Electronically Signed: Sigifredo Lee MD  6/9/2024 8:59 PM EDT  Workstation ID: [id number]    XR Chest 1 View    Result Date: 6/9/2024  Narrative: XR CHEST 1 VW Date of Exam: 6/9/2024 8:26 PM EDT Indication: tachycardia, leukocytosis, no source Comparison: None available. Findings: There are no airspace consolidations. No pleural fluid. No pneumothorax. The pulmonary vasculature appears within normal limits. The cardiac and mediastinal silhouette appear unremarkable. No acute osseous abnormality identified.     Impression: Impression: No active disease. Electronically Signed: Sigifredo Lee MD  6/9/2024 8:50 PM EDT  Workstation ID: [id number]     Allergies:  has No Known Allergies.    Floyd  Reviewed    Discharge Medications:     Discharge Medications        New Medications        Instructions Start Date   Budesonide ER 9 MG tablet sustained-release 24 hour   9 mg, Oral, Daily      ondansetron ODT 4 MG disintegrating tablet  Commonly known as: ZOFRAN-ODT   4 mg,  Oral, Every 6 Hours PRN      pantoprazole 40 MG EC tablet  Commonly known as: PROTONIX   40 mg, Oral, 2 Times Daily Before Meals      simethicone 80 MG chewable tablet  Commonly known as: MYLICON   120 mg, Oral, 4 Times Daily Before Meals & Nightly      valsartan 40 MG tablet  Commonly known as: DIOVAN   40 mg, Oral, Every 24 Hours Scheduled   Start Date: June 19, 2024            Continue These Medications        Instructions Start Date   multivitamin with minerals tablet tablet   1 tablet, Oral, Daily      NON FORMULARY   1 tablet, Oral, Daily, Coconut oil tablet      Risaquad-2 capsule capsule   1 capsule, Oral, Daily             Stop These Medications      aspirin 325 MG EC tablet              Last Lab Results:   Lab Results (most recent)             Imaging Results (Most Recent)       Procedure Component Value Units Date/Time    CT Abdomen Pelvis With Contrast [[id number]] Collected: 06/09/24 2052     Updated: 06/09/24 2102    Narrative:      CT ABDOMEN PELVIS W CONTRAST    Date of Exam: 6/9/2024 8:46 PM EDT    Indication: diarrhea since Oct, weakness, loss of appetite, 40# weight loss, no known med hx.    Comparison: None available.    Technique: Axial CT images were obtained of the abdomen and pelvis following the uneventful intravenous administration of iodinated contrast. Sagittal and coronal reconstructions were performed.  Automated exposure control and iterative reconstruction   methods were used.    Findings:  Lung Bases:     The lung bases are clear. There is mild coronary artery calcific atherosclerosis.    Liver:  There is mild fatty infiltration of liver. There are no focal liver lesions. There is no intrahepatic biliary ductal dilatation.    Biliary/Gallbladder:    There is a large stone in the neck of the gallbladder. There is no pericholecystic fluid or gallbladder wall thickening. The biliary tree is nondilated.    Spleen:  Spleen is normal in size and CT density.    Pancreas:    Pancreas is  normal. There is no evidence of pancreatic mass or peripancreatic fluid.    Kidneys:    Kidneys are normal in size. There are no stones or hydronephrosis.    Adrenals:    Adrenal glands are unremarkable.    Retroperitoneal/Lymph Nodes/Vasculature:    No retroperitoneal adenopathy is identified.    Gastrointestinal/Mesentery:    There is an abnormal appearance of the colon. Primarily involving the ascending and transverse portions of the colon, there is wall thickening and a fluid-filled appearance of the colon with surrounding fat stranding consistent with colitis. The distal   colon also demonstrates some dilatation or wall thickening but without intraluminal fluid. The small bowel is unremarkable with a few fluid-filled loops but without evidence of inflammation. There is no free air. The appendix is dilated and fluid-filled   but appears to be without significant inflammatory change    Bladder:    The bladder is normal.    Genital:     Unremarkable          Bony Structures:     Visualized bony structures are consistent with the patient's age.        Impression:      Impression:  Abnormal appearance of the colon. The ascending and transverse portions of the colon are fluid-filled and dilated with wall thickening and surrounding fat stranding consistent with colitis. The distal colon also demonstrates some wall thickening but   without intraluminal fluid. The appendix is dilated and fluid-filled but without significant inflammatory change.        Electronically Signed: Sigifredo Lee MD    6/9/2024 8:59 PM EDT    Workstation ID: [id number]    XR Chest 1 View [[id number]] Collected: 06/09/24 2049     Updated: 06/09/24 2053    Narrative:      XR CHEST 1 VW    Date of Exam: 6/9/2024 8:26 PM EDT    Indication: tachycardia, leukocytosis, no source    Comparison: None available.    Findings:  There are no airspace consolidations. No pleural fluid. No pneumothorax. The pulmonary vasculature appears within normal limits. The  cardiac and mediastinal silhouette appear unremarkable. No acute osseous abnormality identified.      Impression:      Impression:  No active disease.        Electronically Signed: Sigifredo Lee MD    6/9/2024 8:50 PM EDT    Workstation ID: [id number]            PROCEDURES  Procedure(s):  ESOPHAGOGASTRODUODENOSCOPY WITH BIOPSY  COLONOSCOPY WITH BIOPSY    Condition on Discharge:  Stable, Improved.     Physical Exam at Discharge  Vital Signs  Temp:  [96.9 °F (36.1 °C)-97.8 °F (36.6 °C)] 96.9 °F (36.1 °C)  Heart Rate:  [75-84] 82  Resp:  [18] 18  BP: (135-162)/(64-79) 152/65    Physical Exam  Please see today's progress note    Discharge Disposition  To home in stable condition on daily steroids and Remicade with close GI follow-up    Visiting Nurse:    No    Home PT/OT:  No    Home Safety Evaluation:  No    DME  No new equipment    Discharge Diet:      Dietary Orders (From admission, onward)       Start     Ordered    06/15/24 1050  Diet: Gastrointestinal; Fiber-Restricted; Fluid Consistency: Thin (IDDSI 0)  Diet Effective Now        References:    Diet Order [facility]walk   Question Answer Comment   Diets: Gastrointestinal    Gastrointestinal Diet: Fiber-Restricted    Fluid Consistency: Thin (IDDSI 0)        06/15/24 1050    06/13/24 1800  Dietary Nutrition Supplements Boost Plus (Ensure Enlive, Ensure Plus)  2 Times Daily      Question:  Select Supplement:  Answer:  Boost Plus (Ensure Enlive, Ensure Plus)    06/13/24 1531                    Activity at Discharge:  As tolerated    Pre-discharge education  None       Follow-up Appointments  No future appointments.  Additional Instructions for the Follow-ups that You Need to Schedule       Discharge Follow-up with PCP   As directed       Currently Documented PCP:    Provider, No Known    PCP Phone Number:    [phone]     Follow Up Details: within 1 week first available [facility] provider if no previous PCP        Discharge Follow-up with Specified Provider: Dr. Guerra GI  7-10 days   As directed      To: NICOLASA Ariza 7-10 days                Test Results Pending at Discharge      Discharge over 30 min (if over 30 minutes give explanation as to why it took greater than 30 minutes)  Secondary to:   Coordination of care/follow up  Medication reconciliation  D/W patient      At [facility], we believe that sharing information builds trust and better relationships. You are receiving this note because you recently visited [facility]. It is possible you will see health information before a provider has talked with you about it. This kind of information can be easy to misunderstand. To help you fully understand what it means for your health, we urge you to discuss this note with your provider.    Reese Almaguer DO  06/18/24  09:57 EDT

## 2024-06-18 NOTE — DISCHARGE INSTR - APPOINTMENTS
Patient will be given a list of local primary care providers accepting new patients with Discharge paperwork.     Patient has follow up with Dr. Guerra on June 25 @ 1:15 pm [phone]

## 2024-06-18 NOTE — PROGRESS NOTES
"SERVICE: [facility] HOSPITALIST    CONSULTANTS: GI    CHIEF COMPLAINT: Abdominal pain    SUBJECTIVE: Patient seen and examined at bedside. Patient reports he continues to do well.  Explained current plan of care with patient.  He expresses understanding.  He reports he will remain in the hospital as long as necessary to complete treatment.  No other acute issues reported.  He reports he is eating well.     OBJECTIVE:    Physical exam is largely unchanged from previous exam, except where documented below, examination is accurate as of 6/18/2024    Physical Exam:  General: Patient is awake and alert.  Elderly male. No acute distress noted.   HENT: Head is atraumatic, normocephalic. Hearing is grossly intact. Nose is without obvious congestion and appears patent. Neck is supple and trachea is midline.   Eyes: Vision is grossly intact. Pupils appear equal and round.   Cardiovascular: Heart has regular rate and rhythm with no murmurs, rubs or gallops noted.   Respiratory: Lungs are clear to ausculation without wheezes, rhonchi or rales.   Abdominal/GI: Soft, nontender, bowel sounds present. No rebound or guarding present.   Extremities: No peripheral edema noted.   Musculoskeletal: Spontaneous movement of bilateral upper and lower extremities against gravity noted. No signs of injury or deformity noted.   Skin: Warm and dry.   Psych: Mood and affect are appropriate. Cooperative with exam.   Neuro: No facial asymmetry noted. No focal deficits noted, hearing and vision are grossly intact.     /64 (BP Location: Right arm, Patient Position: Lying)   Pulse 75   Temp 97.7 °F (36.5 °C) (Oral)   Resp 18   Ht 180.3 cm (70.98\")   Wt 72.1 kg (159 lb)   SpO2 95%   BMI 22.19 kg/m²     MEDS/LABS REVIEWED AND ORDERED    [Held by provider] infliximab, 5 mg/kg, Intravenous, Once  lactobacillus acidophilus, 1 capsule, Oral, Daily  pantoprazole, 40 mg, Oral, BID AC  predniSONE, 30 mg, Oral, BID With " Meals  simethicone, 120 mg, Oral, 4x Daily AC & at Bedtime  sodium chloride, 10 mL, Intravenous, Q12H  valsartan, 40 mg, Oral, Q24H          LAB/DIAGNOSTICS:    Lab Results (last 24 hours)       Procedure Component Value Units Date/Time    Calprotectin, Fecal - Stool, Per Rectum [[id number]]  (Abnormal) Collected: 06/10/24 1149    Specimen: Stool from Per Rectum Updated: 06/17/24 2307     Calprotectin, Fecal 5440 ug/g      Comment: **Results verified by repeat testing**  Concentration     Interpretation   Follow-Up  < 5 - 50 ug/g     Normal           None  >50 -120 ug/g     Borderline       Re-evaluate in 4-6 weeks      >120 ug/g     Abnormal         Repeat as clinically                                     indicated       Narrative:      Performed at:  [address]  [id number]  : Ирина Harding MD, Phone:  [phone]          ECG 12 Lead ED Triage Standing Order; Weak / Dizzy / AMS   Final Result   HEART RATE= 98  bpm   RR Interval= 612  ms   SC Interval=   ms   P Horizontal Axis=   deg   P Front Axis=   deg   QRSD Interval= 99  ms   QT Interval= 348  ms   QTcB= 445  ms   QRS Axis= -23  deg   T Wave Axis= 34  deg   - ABNORMAL ECG -   Atrial fibrillation   Borderline left axis deviation   NO PRIOR TRACING AVAILABLE FOR COMPARISON   Electronically Signed By: Lit Duke ([facility]) 10-Bruce-2024 17:12:01   Date and Time of Study: 2024-06-09 19:25:51          No radiology results for the last day      ASSESSMENT/PLAN:  Please note portions of this assessment/plan may have been copied and pasted, but I have personally seen this patient and reviewed each line of this assessment and plan for accuracy and made updates to reflect my necessary changes on 6/18/2024    Moderate to severe ulcerative colitis  -GI following-have switched all meds to p.o., still awaiting path after EGD showed ulcerative esophagitis, erosive gastritis, duodenitis  -TB testing also in process  "  -Continue steroid taper as per GI.   -Advancing diet as per GI  -Remicade on hold until TB testing returns.     Hypertension-continue valsartan    Severe malnutrition-nutrition following, encourage good oral intake    Leukocytosis-in the setting of IV steroids do not suspect underlying infection      PLAN FOR DISPOSITION: TBD    Reese Almaguer DO  06/18/24  08:47 EDT    At [facility], we believe that sharing information builds trust and better relationships. You are receiving this note because you recently [facility]. It is possible you will see health information before a provider has talked with you about it. This kind of information can be easy to misunderstand. To help you fully understand what it means for your health, we urge you to discuss this note with your provider.    \"Dictated utilizing Dragon dictation\"    "

## 2024-06-18 NOTE — TELEPHONE ENCOUNTER
Pt hospitalized DX: UC  Started on Remicade  PA sent for Budesonide ordered by Dr. Almaguer need transition RX to Dr. Guerra with follow up overseeing UC tx.    Pt scheduled for follow up on 25th    Need referral to [facility] for Remicade infusion    Pt received Renflexis today will need orders for 2 remaining induction then maintenance plan

## 2024-06-18 NOTE — CASE MANAGEMENT/SOCIAL WORK
Case Management Discharge Note      Final Note: dc home         Selected Continued Care - Discharged on 6/18/2024 Admission date: 6/9/2024 - Discharge disposition: Home or Self Care      Destination    No services have been selected for the patient.                Durable Medical Equipment    No services have been selected for the patient.                Dialysis/Infusion    No services have been selected for the patient.                Home Medical Care    No services have been selected for the patient.                Therapy    No services have been selected for the patient.                Community Resources    No services have been selected for the patient.                Community & DME    No services have been selected for the patient.                         Final Discharge Disposition Code: 01 - home or self-care

## 2024-06-19 ENCOUNTER — TRANSITIONAL CARE MANAGEMENT TELEPHONE ENCOUNTER (OUTPATIENT)
Dept: CALL CENTER | Facility: HOSPITAL | Age: 84
End: 2024-06-19
Payer: MEDICARE

## 2024-06-19 NOTE — OUTREACH NOTE
Call Center TCM Note      Flowsheet Row Responses   [facility] patient discharged from? [place]   Does the patient have one of the following disease processes/diagnoses(primary or secondary)? Other   TCM attempt successful? No   Unsuccessful attempts Attempt 1   Comments New Pt appt  7/16/24 815 am. Does not meet TCM guidelines for HOSP DC FU .   Does the patient have an appointment with their PCP within 7-14 days of discharge? Other   Nursing Interventions Routed TCM call to PCP office            Anaylei Desouza RN    6/19/2024, 11:02 EDT

## 2024-06-19 NOTE — OUTREACH NOTE
Call Center TCM Note      Flowsheet Row Responses   [facility] patient discharged from? [place]   Does the patient have one of the following disease processes/diagnoses(primary or secondary)? Other   TCM attempt successful? Yes   Call start time 1212   Call end time 1219   Discharge diagnosis Colitis   Meds reviewed with patient/caregiver? Yes   Is the patient having any side effects they believe may be caused by any medication additions or changes? No   Does the patient have all medications ordered at discharge? Yes   Is the patient taking all medications as directed (includes completed medication regime)? Yes   Comments New Pt appt  7/16/24 815 am. Does not meet TCM guidelines for HOSP DC FU .   Does the patient have an appointment with their PCP within 7-14 days of discharge? No   Nursing Interventions Routed TCM call to PCP office   Has home health visited the patient within 72 hours of discharge? N/A   Psychosocial issues? No   Did the patient receive a copy of their discharge instructions? Yes   Nursing interventions Reviewed instructions with patient   What is the patient's perception of their health status since discharge? Improving   Is the patient/caregiver able to teach back signs and symptoms related to disease process for when to call PCP? Yes   Is the patient/caregiver able to teach back signs and symptoms related to disease process for when to call 911? Yes   Is the patient/caregiver able to teach back the hierarchy of who to call/visit for symptoms/problems? PCP, Specialist, Home health nurse, Urgent Care, ED, 911 Yes   TCM call completed? Yes   Wrap up additional comments JALIL sierra he is doing much better. Discussed some foods to avoid until he can see GI Dr next week. Pt states BM is begining to be more formed.   Call end time 1219            Anayeli Desouza RN    6/19/2024, 12:20 EDT

## 2024-06-21 ENCOUNTER — TELEPHONE (OUTPATIENT)
Dept: GASTROENTEROLOGY | Facility: CLINIC | Age: 84
End: 2024-06-21
Payer: MEDICARE

## 2024-06-21 NOTE — TELEPHONE ENCOUNTER
[facility] IS TELLING HIM THAT DR IBARRA DIDN'T AUTHORIZE HIS MED    LOOKS LIKE THEY CONFIRMED REC OF IT 06/18 AT 1221

## 2024-06-25 ENCOUNTER — OFFICE VISIT (OUTPATIENT)
Dept: GASTROENTEROLOGY | Facility: CLINIC | Age: 84
End: 2024-06-25
Payer: MEDICARE

## 2024-06-25 VITALS
DIASTOLIC BLOOD PRESSURE: 62 MMHG | WEIGHT: 157.8 LBS | HEIGHT: 71 IN | BODY MASS INDEX: 22.09 KG/M2 | SYSTOLIC BLOOD PRESSURE: 124 MMHG

## 2024-06-25 DIAGNOSIS — K51.00 ULCERATIVE PANCOLITIS: Primary | ICD-10-CM

## 2024-06-25 RX ORDER — PREDNISONE 10 MG/1
TABLET ORAL
Qty: 91 TABLET | Refills: 0 | Status: SHIPPED | OUTPATIENT
Start: 2024-06-25 | End: 2024-08-06

## 2024-06-25 NOTE — PROGRESS NOTES
Mehnaz Thayer is a 84 y.o. male with PMH of recently diagnosed Ulcerative Pancolitis + noted below who presents with   Chief Complaint   Patient presents with    Ulcerative Colitis       Subjective     # Ulcerative Pancolitis   - Denies abdominal pain, diarrhea, or overt GI bleeding.   - Reports he has gained weight since his hospitalization.   - C/s on 6/13 showed moderate to severe inflammation extending from rectum to cecum.  - Given first induction dose of Remicade while inpatient.   - Was on Prednisone while inpatient and attempted to transition to Budesonide 9 mg QD at discharge which was denied by insurance.   - Fecal calprotectin was 5440 on 6/10.           History reviewed. No pertinent past medical history.    Social History     Socioeconomic History    Marital status:    Tobacco Use    Smoking status: Former     Types: Cigarettes     Passive exposure: Past    Smokeless tobacco: Never   Vaping Use    Vaping status: Never Used   Substance and Sexual Activity    Alcohol use: Not Currently    Drug use: Never    Sexual activity: Defer         Current Outpatient Medications:     multivitamin with minerals (MULTIVITAMIN ADULT PO), Take 1 tablet by mouth Daily., Disp: , Rfl:     ondansetron ODT (ZOFRAN-ODT) 4 MG disintegrating tablet, Take 1 tablet by mouth Every 6 (Six) Hours As Needed for Nausea or Vomiting., Disp: 20 tablet, Rfl: 0    pantoprazole (PROTONIX) 40 MG EC tablet, Take 1 tablet by mouth 2 (Two) Times a Day Before Meals., Disp: 60 tablet, Rfl: 0    predniSONE (DELTASONE) 10 MG tablet, Take 4 tablets by mouth Daily for 7 days, THEN 3 tablets Daily for 7 days, THEN 2 tablets Daily for 14 days, THEN 1 tablet Daily for 14 days., Disp: 91 tablet, Rfl: 0    simethicone (MYLICON) 80 MG chewable tablet, Chew 1.5 tablets by mouth 4 (Four) Times a Day Before Meals & at Bedtime., Disp: 120 tablet, Rfl: 0    valsartan (DIOVAN) 40 MG tablet, Take 1 tablet by mouth Daily., Disp: 30 tablet, Rfl: 0    NON  FORMULARY, Take 1 tablet by mouth Daily. Coconut oil tablet (Patient not taking: Reported on 6/25/2024), Disp: , Rfl:     Probiotic Product (Risaquad-2) capsule capsule, Take 1 capsule by mouth Daily. (Patient not taking: Reported on 6/25/2024), Disp: , Rfl:     Objective   Vitals:    06/25/24 1317   BP: 124/62         06/25/24  1317   Weight: 71.6 kg (157 lb 12.8 oz)     Body mass index is 22.02 kg/m².      Physical Exam  Vitals reviewed.   Constitutional:       Appearance: Normal appearance.   HENT:      Head: Normocephalic and atraumatic.   Eyes:      Extraocular Movements: Extraocular movements intact.      Conjunctiva/sclera: Conjunctivae normal.   Cardiovascular:      Rate and Rhythm: Normal rate and regular rhythm.      Heart sounds: Normal heart sounds.   Pulmonary:      Effort: Pulmonary effort is normal.      Breath sounds: Normal breath sounds.   Abdominal:      General: Abdomen is flat. Bowel sounds are normal. There is no distension.      Palpations: Abdomen is soft.      Tenderness: There is no abdominal tenderness.   Neurological:      Mental Status: He is alert.   Psychiatric:         Mood and Affect: Mood normal.         Behavior: Behavior normal.         WBC   Date Value Ref Range Status   06/17/2024 14.07 (H) 3.40 - 10.80 10*3/mm3 Final     RBC   Date Value Ref Range Status   06/17/2024 4.30 4.14 - 5.80 10*6/mm3 Final     Hemoglobin   Date Value Ref Range Status   06/17/2024 12.7 (L) 13.0 - 17.7 g/dL Final     Hematocrit   Date Value Ref Range Status   06/17/2024 38.2 37.5 - 51.0 % Final     MCV   Date Value Ref Range Status   06/17/2024 88.8 79.0 - 97.0 fL Final     MCH   Date Value Ref Range Status   06/17/2024 29.5 26.6 - 33.0 pg Final     MCHC   Date Value Ref Range Status   06/17/2024 33.2 31.5 - 35.7 g/dL Final     RDW   Date Value Ref Range Status   06/17/2024 14.1 12.3 - 15.4 % Final     RDW-SD   Date Value Ref Range Status   06/17/2024 45.1 37.0 - 54.0 fl Final     MPV   Date Value Ref  Range Status   06/17/2024 8.4 6.0 - 12.0 fL Final     Platelets   Date Value Ref Range Status   06/17/2024 482 (H) 140 - 450 10*3/mm3 Final     Neutrophil %   Date Value Ref Range Status   06/17/2024 73.1 42.7 - 76.0 % Final     Lymphocyte %   Date Value Ref Range Status   06/17/2024 12.4 (L) 19.6 - 45.3 % Final     Monocyte %   Date Value Ref Range Status   06/17/2024 3.6 (L) 5.0 - 12.0 % Final     Eosinophil %   Date Value Ref Range Status   06/17/2024 0.1 (L) 0.3 - 6.2 % Final     Basophil %   Date Value Ref Range Status   06/17/2024 0.9 0.0 - 1.5 % Final     Immature Grans %   Date Value Ref Range Status   06/17/2024 9.9 (H) 0.0 - 0.5 % Final     Neutrophils, Absolute   Date Value Ref Range Status   06/17/2024 10.29 (H) 1.70 - 7.00 10*3/mm3 Final     Lymphocytes, Absolute   Date Value Ref Range Status   06/17/2024 1.74 0.70 - 3.10 10*3/mm3 Final     Monocytes, Absolute   Date Value Ref Range Status   06/17/2024 0.51 0.10 - 0.90 10*3/mm3 Final     Eosinophils, Absolute   Date Value Ref Range Status   06/17/2024 0.01 0.00 - 0.40 10*3/mm3 Final     Basophils, Absolute   Date Value Ref Range Status   06/17/2024 0.13 0.00 - 0.20 10*3/mm3 Final     Immature Grans, Absolute   Date Value Ref Range Status   06/17/2024 1.39 (H) 0.00 - 0.05 10*3/mm3 Final     nRBC   Date Value Ref Range Status   06/17/2024 0.0 0.0 - 0.2 /100 WBC Final       Lab Results   Component Value Date    GLUCOSE 144 (H) 06/17/2024    BUN 20 06/17/2024    CREATININE 0.76 06/17/2024    BCR 26.3 (H) 06/17/2024    CO2 27.9 06/17/2024    CALCIUM 8.5 (L) 06/17/2024    ALBUMIN 3.3 (L) 06/17/2024    AST 20 06/17/2024    ALT 21 06/17/2024         Imaging Results (Last 7 Days)       ** No results found for the last 168 hours. **              Assessment & Plan   Diagnoses and all orders for this visit:    1. Ulcerative pancolitis (Primary)  Assessment & Plan:  - Continue Remicade   - Budesonide not approved by insurance, will start Prednisone taper   - Repeat  colonoscopy in 3 to 4 months to assess for endoscopic remission.     Orders:  -     Case Request; Standing  -     Verify bowel prep was successful; Standing  -     Give tap water enema if bowel prep was insufficient; Standing  -     Obtain Informed Consent; Standing  -     Follow Anesthesia Guidelines / Protocol; Standing  -     Case Request      RTC in 3 months     I have discussed the above plan with the patient.  They verbalize understanding and are in agreement with the plan.  They have been advised to contact the office for any questions, concerns, or changes related to their health.

## 2024-06-25 NOTE — ASSESSMENT & PLAN NOTE
- Continue Remicade   - Budesonide not approved by insurance, will start Prednisone taper   - Repeat colonoscopy in 3 to 4 months to assess for endoscopic remission.

## 2024-06-27 ENCOUNTER — HOSPITAL ENCOUNTER (OUTPATIENT)
Dept: INFUSION THERAPY | Facility: HOSPITAL | Age: 84
Discharge: HOME | End: 2024-06-27
Admitting: STUDENT IN AN ORGANIZED HEALTH CARE EDUCATION/TRAINING PROGRAM
Payer: MEDICARE

## 2024-06-27 VITALS
DIASTOLIC BLOOD PRESSURE: 63 MMHG | HEART RATE: 66 BPM | TEMPERATURE: 97 F | OXYGEN SATURATION: 97 % | RESPIRATION RATE: 16 BRPM | SYSTOLIC BLOOD PRESSURE: 144 MMHG

## 2024-06-27 DIAGNOSIS — K51.00 ULCERATIVE PANCOLITIS: Primary | ICD-10-CM

## 2024-06-27 PROCEDURE — 96413 CHEMO IV INFUSION 1 HR: CPT

## 2024-06-27 PROCEDURE — 25810000003 SODIUM CHLORIDE 0.9 % SOLUTION 250 ML FLEX CONT: Performed by: STUDENT IN AN ORGANIZED HEALTH CARE EDUCATION/TRAINING PROGRAM

## 2024-06-27 PROCEDURE — 25010000002 INFLIXIMAB PER 10 MG: Performed by: STUDENT IN AN ORGANIZED HEALTH CARE EDUCATION/TRAINING PROGRAM

## 2024-06-27 PROCEDURE — 96415 CHEMO IV INFUSION ADDL HR: CPT

## 2024-06-27 RX ORDER — SODIUM CHLORIDE 9 MG/ML
20 INJECTION, SOLUTION INTRAVENOUS ONCE
Status: DISCONTINUED | OUTPATIENT
Start: 2024-06-27 | End: 2024-06-29 | Stop reason: HOSPADM

## 2024-06-27 RX ORDER — SODIUM CHLORIDE 9 MG/ML
20 INJECTION, SOLUTION INTRAVENOUS ONCE
OUTPATIENT
Start: 2024-07-11

## 2024-06-27 RX ADMIN — INFLIXIMAB 358 MG: 100 INJECTION, POWDER, LYOPHILIZED, FOR SOLUTION INTRAVENOUS at 10:10

## 2024-06-27 NOTE — NURSING NOTE
0913 Patient arrives to [facility] with wife.  History and medications reviewed with patient. VSS. Infusion given as ordered, patient tolerated well. Next appointment scheduled and AVS printed, reviewed and copy to wife. Patient discharged from [facility] in stable condition with wife at 1235.

## 2024-06-27 NOTE — PATIENT INSTRUCTIONS
"  Call Dr. Teofilo Orlando, Gastroenterology at [phone] if you have any problems or concerns.    We know you have a Choice in healthcare and appreciate you using [facility].  Our purpose is to provide you \"Excellent Care\".  We hope that you will always choose us in the future and continue to recommend us to your family and friends.             "

## 2024-06-28 ENCOUNTER — READMISSION MANAGEMENT (OUTPATIENT)
Dept: CALL CENTER | Facility: HOSPITAL | Age: 84
End: 2024-06-28
Payer: MEDICARE

## 2024-06-28 NOTE — OUTREACH NOTE
Medical Week 2 Survey      Flowsheet Row Responses   [facility] patient discharged from? [place]   Does the patient have one of the following disease processes/diagnoses(primary or secondary)? Other   Week 2 attempt successful? Yes   Call start time 1723   Discharge diagnosis Colitis   Call end time 1729   Person spoke with today (if not patient) and relationship patient   Meds reviewed with patient/caregiver? Yes   Is the patient having any side effects they believe may be caused by any medication additions or changes? No   Does the patient have all medications ordered at discharge? Yes   Is the patient taking all medications as directed (includes completed medication regime)? Yes   Does the patient have a primary care provider?  Yes   Does the patient have an appointment with their PCP within 7 days of discharge? Yes   Has the patient kept scheduled appointments due by today? Yes   Psychosocial issues? No   Did the patient receive a copy of their discharge instructions? Yes   Nursing interventions Reviewed instructions with patient   What is the patient's perception of their health status since discharge? Improving   Is the patient/caregiver able to teach back signs and symptoms related to disease process for when to call PCP? Yes   Is the patient/caregiver able to teach back signs and symptoms related to disease process for when to call 911? Yes   Is the patient/caregiver able to teach back the hierarchy of who to call/visit for symptoms/problems? PCP, Specialist, Home health nurse, Urgent Care, ED, 911 Yes   If the patient is a current smoker, are they able to teach back resources for cessation? Not a smoker   Week 2 Call Completed? Yes   Graduated Yes   Did the patient feel the follow up calls were helpful during their recovery period? Yes   Was the number of calls appropriate? Yes   Call end time 1729            Eliu QUICK - Registered Nurse

## 2024-07-02 DIAGNOSIS — K51.00 ULCERATIVE PANCOLITIS: ICD-10-CM

## 2024-07-02 RX ORDER — PREDNISONE 10 MG/1
TABLET ORAL
Qty: 91 TABLET | Refills: 0 | OUTPATIENT
Start: 2024-07-02

## 2024-07-16 ENCOUNTER — OFFICE VISIT (OUTPATIENT)
Dept: FAMILY MEDICINE CLINIC | Facility: CLINIC | Age: 84
End: 2024-07-16
Payer: MEDICARE

## 2024-07-16 VITALS
RESPIRATION RATE: 18 BRPM | SYSTOLIC BLOOD PRESSURE: 146 MMHG | DIASTOLIC BLOOD PRESSURE: 66 MMHG | HEIGHT: 71 IN | WEIGHT: 163 LBS | OXYGEN SATURATION: 97 % | HEART RATE: 68 BPM | BODY MASS INDEX: 22.82 KG/M2

## 2024-07-16 DIAGNOSIS — Z12.5 SCREENING FOR PROSTATE CANCER: ICD-10-CM

## 2024-07-16 DIAGNOSIS — E78.2 MIXED HYPERLIPIDEMIA: Primary | ICD-10-CM

## 2024-07-16 DIAGNOSIS — E87.1 HYPONATREMIA: ICD-10-CM

## 2024-07-16 DIAGNOSIS — R73.9 HYPERGLYCEMIA: ICD-10-CM

## 2024-07-16 DIAGNOSIS — D64.9 ANEMIA, UNSPECIFIED TYPE: ICD-10-CM

## 2024-07-16 DIAGNOSIS — D72.829 LEUKOCYTOSIS, UNSPECIFIED TYPE: ICD-10-CM

## 2024-07-16 NOTE — PROGRESS NOTES
Venipuncture Blood Specimen Collection  Venipuncture performed in left arm by Leslee Louie MA with good hemostasis. Patient tolerated the procedure well without complications.   07/16/24   Leslee Louie MA

## 2024-07-17 LAB
ALBUMIN SERPL-MCNC: 3.9 G/DL (ref 3.7–4.7)
ALP SERPL-CCNC: 66 IU/L (ref 44–121)
ALT SERPL-CCNC: 16 IU/L (ref 0–44)
AST SERPL-CCNC: 15 IU/L (ref 0–40)
BASOPHILS # BLD AUTO: 0.1 X10E3/UL (ref 0–0.2)
BASOPHILS NFR BLD AUTO: 0 %
BILIRUB SERPL-MCNC: 0.4 MG/DL (ref 0–1.2)
BUN SERPL-MCNC: 34 MG/DL (ref 8–27)
BUN/CREAT SERPL: 36 (ref 10–24)
CALCIUM SERPL-MCNC: 9.6 MG/DL (ref 8.6–10.2)
CHLORIDE SERPL-SCNC: 100 MMOL/L (ref 96–106)
CHOLEST SERPL-MCNC: 237 MG/DL (ref 100–199)
CO2 SERPL-SCNC: 25 MMOL/L (ref 20–29)
CREAT SERPL-MCNC: 0.94 MG/DL (ref 0.76–1.27)
EGFRCR SERPLBLD CKD-EPI 2021: 79 ML/MIN/1.73
EOSINOPHIL # BLD AUTO: 0.1 X10E3/UL (ref 0–0.4)
EOSINOPHIL NFR BLD AUTO: 1 %
ERYTHROCYTE [DISTWIDTH] IN BLOOD BY AUTOMATED COUNT: 15.7 % (ref 11.6–15.4)
GLOBULIN SER CALC-MCNC: 2.3 G/DL (ref 1.5–4.5)
GLUCOSE SERPL-MCNC: 102 MG/DL (ref 70–99)
HBA1C MFR BLD: 5.6 % (ref 4.8–5.6)
HCT VFR BLD AUTO: 40.1 % (ref 37.5–51)
HDLC SERPL-MCNC: 77 MG/DL
HGB BLD-MCNC: 13.2 G/DL (ref 13–17.7)
IMM GRANULOCYTES # BLD AUTO: 0.2 X10E3/UL (ref 0–0.1)
IMM GRANULOCYTES NFR BLD AUTO: 1 %
LDLC SERPL CALC-MCNC: 139 MG/DL (ref 0–99)
LYMPHOCYTES # BLD AUTO: 2.7 X10E3/UL (ref 0.7–3.1)
LYMPHOCYTES NFR BLD AUTO: 19 %
MAGNESIUM SERPL-MCNC: 2.1 MG/DL (ref 1.6–2.3)
MCH RBC QN AUTO: 29.7 PG (ref 26.6–33)
MCHC RBC AUTO-ENTMCNC: 32.9 G/DL (ref 31.5–35.7)
MCV RBC AUTO: 90 FL (ref 79–97)
MONOCYTES # BLD AUTO: 1.2 X10E3/UL (ref 0.1–0.9)
MONOCYTES NFR BLD AUTO: 8 %
NEUTROPHILS # BLD AUTO: 10 X10E3/UL (ref 1.4–7)
NEUTROPHILS NFR BLD AUTO: 71 %
PHOSPHATE SERPL-MCNC: 3.6 MG/DL (ref 2.8–4.1)
PLATELET # BLD AUTO: 338 X10E3/UL (ref 150–450)
POTASSIUM SERPL-SCNC: 5.8 MMOL/L (ref 3.5–5.2)
PROT SERPL-MCNC: 6.2 G/DL (ref 6–8.5)
PSA SERPL-MCNC: 1.6 NG/ML (ref 0–4)
RBC # BLD AUTO: 4.44 X10E6/UL (ref 4.14–5.8)
SODIUM SERPL-SCNC: 137 MMOL/L (ref 134–144)
TRIGL SERPL-MCNC: 121 MG/DL (ref 0–149)
VLDLC SERPL CALC-MCNC: 21 MG/DL (ref 5–40)
WBC # BLD AUTO: 14.1 X10E3/UL (ref 3.4–10.8)

## 2024-07-19 RX ORDER — PANTOPRAZOLE SODIUM 40 MG/1
40 TABLET, DELAYED RELEASE ORAL
Qty: 180 TABLET | Refills: 3 | Status: SHIPPED | OUTPATIENT
Start: 2024-07-19

## 2024-07-23 DIAGNOSIS — K51.00 ULCERATIVE PANCOLITIS: ICD-10-CM

## 2024-07-23 RX ORDER — PREDNISONE 10 MG/1
TABLET ORAL
Qty: 91 TABLET | Refills: 0 | OUTPATIENT
Start: 2024-07-23

## 2024-07-24 ENCOUNTER — HOSPITAL ENCOUNTER (EMERGENCY)
Facility: HOSPITAL | Age: 84
Discharge: HOME | End: 2024-07-24
Attending: STUDENT IN AN ORGANIZED HEALTH CARE EDUCATION/TRAINING PROGRAM
Payer: MEDICARE

## 2024-07-24 VITALS
SYSTOLIC BLOOD PRESSURE: 143 MMHG | DIASTOLIC BLOOD PRESSURE: 74 MMHG | TEMPERATURE: 98.5 F | HEART RATE: 95 BPM | WEIGHT: 160 LBS | HEIGHT: 71 IN | RESPIRATION RATE: 20 BRPM | OXYGEN SATURATION: 93 % | BODY MASS INDEX: 22.4 KG/M2

## 2024-07-24 DIAGNOSIS — I10 HYPERTENSION, UNSPECIFIED TYPE: ICD-10-CM

## 2024-07-24 DIAGNOSIS — F41.9 ANXIETY: Primary | ICD-10-CM

## 2024-07-24 PROCEDURE — 99282 EMERGENCY DEPT VISIT SF MDM: CPT
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
## 2024-07-24 NOTE — PROGRESS NOTES
Call from [facility] inquiring when Carole could be seen. Will coordinate with providers and return call to Mahesh.    Carole due for seroma check in 1 week. Will ensure seroma check is scheduled.   Lengthy Pastoral care visit and prayer with patient and his wife at bedside.    Chaplain Mehul

## 2024-07-25 ENCOUNTER — HOSPITAL ENCOUNTER (OUTPATIENT)
Dept: INFUSION THERAPY | Facility: HOSPITAL | Age: 84
Discharge: HOME | End: 2024-07-25
Payer: MEDICARE

## 2024-07-25 VITALS
TEMPERATURE: 97 F | HEART RATE: 70 BPM | DIASTOLIC BLOOD PRESSURE: 63 MMHG | OXYGEN SATURATION: 96 % | SYSTOLIC BLOOD PRESSURE: 168 MMHG | RESPIRATION RATE: 16 BRPM

## 2024-07-25 DIAGNOSIS — K51.00 ULCERATIVE PANCOLITIS: Primary | ICD-10-CM

## 2024-07-25 PROCEDURE — 25010000002 INFLIXIMAB PER 10 MG: Performed by: STUDENT IN AN ORGANIZED HEALTH CARE EDUCATION/TRAINING PROGRAM

## 2024-07-25 PROCEDURE — 96413 CHEMO IV INFUSION 1 HR: CPT

## 2024-07-25 PROCEDURE — 96366 THER/PROPH/DIAG IV INF ADDON: CPT

## 2024-07-25 PROCEDURE — 25810000003 SODIUM CHLORIDE 0.9 % SOLUTION 250 ML FLEX CONT: Performed by: STUDENT IN AN ORGANIZED HEALTH CARE EDUCATION/TRAINING PROGRAM

## 2024-07-25 PROCEDURE — 96365 THER/PROPH/DIAG IV INF INIT: CPT

## 2024-07-25 PROCEDURE — 96415 CHEMO IV INFUSION ADDL HR: CPT

## 2024-07-25 RX ORDER — SODIUM CHLORIDE 9 MG/ML
20 INJECTION, SOLUTION INTRAVENOUS ONCE
OUTPATIENT
Start: 2024-08-08

## 2024-07-25 RX ORDER — SODIUM CHLORIDE 9 MG/ML
20 INJECTION, SOLUTION INTRAVENOUS ONCE
OUTPATIENT
Start: 2024-07-25

## 2024-07-25 RX ADMIN — INFLIXIMAB 363 MG: 100 INJECTION, POWDER, LYOPHILIZED, FOR SOLUTION INTRAVENOUS at 09:49

## 2024-07-25 NOTE — NURSING NOTE
Patient arrived to [facility] at 0900.  History and medications reviewed with patient.  Medication administered as ordered without complications.  AVS refused by patient.  Patient discharged at 1200i n stable condition without complaint.

## 2024-07-25 NOTE — PATIENT INSTRUCTIONS
"  Call Dr. Teofilo Orlando, Gastroenterology at [phone] if you have any problems or concerns.    We know you have a Choice in healthcare and appreciate you using [facility].  Our purpose is to provide you \"Excellent Care\".  We hope that you will always choose us in the future and continue to recommend us to your family and friends.                "

## 2024-07-26 NOTE — PROGRESS NOTES
Kaiden Todd DO  [facility] PR[facility]  [address] [place] [address] [address]  [place] [id number] [phone]    Subjective      Name Mehnaz Thayer MRN [id number]    1940 AGE/SEX 84 y.o. / male      Chief Complaint Chief Complaint   Patient presents with    Establish Care     New patient here to establish care     Med Refill         Visit History for  2024    Mehnaz Thayer is a 84 y.o. male who presented today for Establish Care (New patient here to establish [facility] ) and Med Refill     History of Present Illness  The patient is an 84-year-old male who presents for evaluation of multiple medical concerns.    The patient was previously diagnosed with ulcerative pancolitis by Dr. Orlando, during which he was prescribed medication to prevent gastric ulcers. His bowel movements have since returned to normal, although he experienced significant diarrhea. His previous primary care physician was Dr. Andrews. His current medication regimen includes a probiotic 325 mg for the past 60 years, aspirin 325, and vitamins. He is also on valsartan for hypertension. He reports excessive flatulence, for which he was prescribed 1.5 chewable tablets post-meal. He is scheduled to follow up with his gastroenterologist on . He is also on Remicade and steroids.         Medications and Allergies   Current Outpatient Medications   Medication Instructions    multivitamin with minerals (MULTIVITAMIN ADULT PO) 1 tablet, Oral, Daily    NON FORMULARY 1 tablet, Oral, Daily, Coconut oil tablet    ondansetron ODT (ZOFRAN-ODT) 4 mg, Oral, Every 6 Hours PRN    pantoprazole (PROTONIX) 40 mg, Oral, 2 Times Daily Before Meals    predniSONE (DELTASONE) 10 MG tablet Take 4 tablets by mouth Daily for 7 days, THEN 3 tablets Daily for 7 days, THEN 2 tablets Daily for 14 days, THEN 1 tablet Daily for 14 days.    Probiotic Product (Risaquad-2) capsule capsule 1 capsule, Oral, Daily    simethicone (MYLICON) 80 MG chewable  "tablet Chew 1.5 tablets by mouth 4 (Four) Times a Day Before Meals & at Bedtime.    valsartan (DIOVAN) 40 mg, Oral, Every 24 Hours Scheduled     No Known Allergies   I have reviewed the above medications and allergies     Objective:      Vitals Vitals:    07/16/24 0810   BP: 146/66   BP Location: Right arm   Patient Position: Sitting   Cuff Size: Adult   Pulse: 68   Resp: 18   SpO2: 97%   Weight: 73.9 kg (163 lb)   Height: 180.3 cm (70.98\")     Body mass index is 22.75 kg/m².    Physical Exam  Vitals reviewed.   Constitutional:       General: He is not in acute distress.     Appearance: He is not ill-appearing.   Cardiovascular:      Rate and Rhythm: Normal rate and regular rhythm.   Pulmonary:      Effort: Pulmonary effort is normal.   Psychiatric:         Mood and Affect: Mood normal.         Behavior: Behavior normal.         Thought Content: Thought content normal.         Judgment: Judgment normal.        Physical Exam       Results  Laboratory Studies  Sodium was low. Calcium was low. Blood sugar was high.     Assessment/Plan   Issues Addressed/ Plan   Diagnosis Plan   1. Mixed hyperlipidemia  Lipid panel      2. Hyponatremia  Comprehensive metabolic panel      3. Hyperglycemia  Hemoglobin A1c      4. Leukocytosis, unspecified type  CBC & Differential    Phosphorus    Magnesium      5. Anemia, unspecified type  Phosphorus    Magnesium      6. Screening for prostate cancer  PSA SCREENING         Assessment & Plan  1. Ulcerative pancolitis.  The patient's blood pressure is slightly elevated, however, it remains within acceptable limits. Laboratory tests will be conducted today.      Follow-up  A follow-up appointment is scheduled for 1 month from now.     BMI is within normal parameters. No other follow-up for BMI required.     There are no Patient Instructions on file for this visit.   Follow up  recommended Return in about 1 month (around 8/16/2024).   - Dragon voice recognition software was utilized to " complete this chart.  Every reasonable attempt was made to edit and correct the text, however some incorrect words may remain.   Kaiden Todd DO    Patient or patient representative verbalized consent for the use of Ambient Listening during the visit with  Kaiden Todd DO for chart documentation. 7/26/2024  17:55 EDT

## 2024-08-20 ENCOUNTER — OFFICE VISIT (OUTPATIENT)
Dept: FAMILY MEDICINE CLINIC | Facility: CLINIC | Age: 84
End: 2024-08-20
Payer: MEDICARE

## 2024-08-20 VITALS
OXYGEN SATURATION: 96 % | DIASTOLIC BLOOD PRESSURE: 74 MMHG | SYSTOLIC BLOOD PRESSURE: 118 MMHG | HEIGHT: 71 IN | RESPIRATION RATE: 18 BRPM | BODY MASS INDEX: 23.94 KG/M2 | HEART RATE: 83 BPM | WEIGHT: 171 LBS

## 2024-08-20 DIAGNOSIS — K52.9 COLITIS: Primary | ICD-10-CM

## 2024-08-20 DIAGNOSIS — K59.00 CONSTIPATION, UNSPECIFIED CONSTIPATION TYPE: ICD-10-CM

## 2024-08-20 PROCEDURE — 1159F MED LIST DOCD IN RCRD: CPT | Performed by: STUDENT IN AN ORGANIZED HEALTH CARE EDUCATION/TRAINING PROGRAM

## 2024-08-20 PROCEDURE — 99213 OFFICE O/P EST LOW 20 MIN: CPT | Performed by: STUDENT IN AN ORGANIZED HEALTH CARE EDUCATION/TRAINING PROGRAM

## 2024-08-20 PROCEDURE — 1160F RVW MEDS BY RX/DR IN RCRD: CPT | Performed by: STUDENT IN AN ORGANIZED HEALTH CARE EDUCATION/TRAINING PROGRAM

## 2024-08-20 RX ORDER — MAGNESIUM OXIDE 400 MG/1
400 TABLET ORAL DAILY
COMMUNITY

## 2024-08-20 NOTE — PROGRESS NOTES
"    Kaiden Todd DO  [facility] PRIMARY [facility]  [address] [place] [address] [address]  [place] [id number] [phone]    Subjective      Name Mehnaz Thayer MRN [id number]    1940 AGE/SEX 84 y.o. / male      Chief Complaint Chief Complaint   Patient presents with    Hyperlipidemia     Check up          Visit History for  2024    Mehnaz Thayer is a 84 y.o. male who presented today for Hyperlipidemia (Check up )     History of Present Illness  He has discontinued the use of valsartan. His current medication regimen includes pantoprazole, taken twice daily, and a daily dose of magnesium. He had been taking aspirin 325 mg for the past 60 years but recently stopped due to his colitis condition.    He reports that his bowel movements have been regular and well-formed, although he experienced some difficulty yesterday morning. He admits to not drinking enough water and typically consumes two cups of coffee in the morning. His diet consists of breakfast and dinner, with a light lunch of a sandwich or cookies. He also takes coconut oil supplements.       Medications and Allergies   Current Outpatient Medications   Medication Instructions    magnesium oxide (MAG-OX) 400 mg, Oral, Daily    multivitamin with minerals (MULTIVITAMIN ADULT PO) 1 tablet, Oral, Daily    NON FORMULARY 1 tablet, Oral, Daily, Coconut oil tablet    pantoprazole (PROTONIX) 40 mg, Oral, 2 Times Daily Before Meals    Probiotic Product (Risaquad-2) capsule capsule 1 capsule, Oral, Daily    simethicone (MYLICON) 80 MG chewable tablet Chew 1.5 tablets by mouth 4 (Four) Times a Day Before Meals & at Bedtime.     No Known Allergies   I have reviewed the above medications and allergies     Objective:      Vitals Vitals:    24 1338   BP: 118/74   BP Location: Right arm   Patient Position: Sitting   Cuff Size: Adult   Pulse: 83   Resp: 18   SpO2: 96%   Weight: 77.6 kg (171 lb)   Height: 180.3 cm (71\")     Body mass index is 23.85 kg/m².  "   Physical Exam  Vitals reviewed.   Constitutional:       General: He is not in acute distress.     Appearance: He is not ill-appearing.   Pulmonary:      Effort: Pulmonary effort is normal.   Psychiatric:         Mood and Affect: Mood normal.         Behavior: Behavior normal.         Thought Content: Thought content normal.         Judgment: Judgment normal.        Physical Exam       Results       Assessment/Plan   Issues Addressed/ Plan   Diagnosis Plan   1. Colitis        2. Constipation, unspecified constipation type           Assessment & Plan  1. Colitis.  Given his colitis, it is advisable to avoid aspirin due to its potential to cause gastric discomfort. He was advised to discontinue aspirin 325 mg. His lab results are satisfactory, and his weight is within the normal range. His blood pressure is expected to remain stable without the need for medication.    2. Constipation.  His constipation is likely influenced by his dietary habits and hydration status.  You possibly consider utilization of MiraLAX as well.  However, he may need to follow-up with GI.    3. Medication Management.  He is currently taking Pantoprazole 40 mg twice a day and magnesium supplements. He was advised to continue    Follow-up  The patient will follow up in 3 months.     BMI is within normal parameters. No other follow-up for BMI required.     There are no Patient Instructions on file for this visit.   Follow up  recommended Return in about 3 months (around 11/20/2024) for Medicare Wellness.   - Dragon voice recognition software was utilized to complete this chart.  Every reasonable attempt was made to edit and correct the text, however some incorrect words may remain.   Kaiden Todd DO    Patient or patient representative verbalized consent for the use of Ambient Listening during the visit with  Kaiden Todd DO for chart documentation. 8/31/2024  03:30 EDT

## 2024-09-12 ENCOUNTER — PREP FOR SURGERY (OUTPATIENT)
Dept: SURGERY | Facility: SURGERY CENTER | Age: 84
End: 2024-09-12
Payer: MEDICARE

## 2024-09-12 DIAGNOSIS — K51.00 ULCERATIVE PANCOLITIS: Primary | ICD-10-CM

## 2024-09-12 DIAGNOSIS — Z12.11 ENCOUNTER FOR SCREENING COLONOSCOPY: ICD-10-CM

## 2024-09-12 RX ORDER — SODIUM CHLORIDE 0.9 % (FLUSH) 0.9 %
3 SYRINGE (ML) INJECTION EVERY 12 HOURS SCHEDULED
OUTPATIENT
Start: 2024-09-12

## 2024-09-12 RX ORDER — SODIUM CHLORIDE, SODIUM LACTATE, POTASSIUM CHLORIDE, CALCIUM CHLORIDE 600; 310; 30; 20 MG/100ML; MG/100ML; MG/100ML; MG/100ML
30 INJECTION, SOLUTION INTRAVENOUS CONTINUOUS PRN
OUTPATIENT
Start: 2024-09-12

## 2024-09-12 RX ORDER — SODIUM CHLORIDE 0.9 % (FLUSH) 0.9 %
10 SYRINGE (ML) INJECTION AS NEEDED
OUTPATIENT
Start: 2024-09-12

## 2024-09-25 ENCOUNTER — OFFICE VISIT (OUTPATIENT)
Dept: GASTROENTEROLOGY | Facility: CLINIC | Age: 84
End: 2024-09-25
Payer: MEDICARE

## 2024-09-25 VITALS
WEIGHT: 175.8 LBS | DIASTOLIC BLOOD PRESSURE: 90 MMHG | BODY MASS INDEX: 24.61 KG/M2 | HEIGHT: 71 IN | SYSTOLIC BLOOD PRESSURE: 134 MMHG

## 2024-09-25 DIAGNOSIS — K51.00 ULCERATIVE PANCOLITIS: Primary | ICD-10-CM

## 2024-09-25 DIAGNOSIS — K21.00 GASTROESOPHAGEAL REFLUX DISEASE WITH ESOPHAGITIS WITHOUT HEMORRHAGE: ICD-10-CM

## 2024-09-26 ENCOUNTER — HOSPITAL ENCOUNTER (OUTPATIENT)
Dept: INFUSION THERAPY | Facility: HOSPITAL | Age: 84
Discharge: HOME | End: 2024-09-26
Payer: MEDICARE

## 2024-09-26 ENCOUNTER — TELEPHONE (OUTPATIENT)
Dept: FAMILY MEDICINE CLINIC | Facility: CLINIC | Age: 84
End: 2024-09-26
Payer: MEDICARE

## 2024-09-26 ENCOUNTER — TELEPHONE (OUTPATIENT)
Dept: INFUSION THERAPY | Facility: HOSPITAL | Age: 84
End: 2024-09-26
Payer: MEDICARE

## 2024-09-26 ENCOUNTER — HOSPITAL ENCOUNTER (EMERGENCY)
Facility: HOSPITAL | Age: 84
Discharge: HOME | End: 2024-09-26
Attending: EMERGENCY MEDICINE
Payer: MEDICARE

## 2024-09-26 ENCOUNTER — LAB (OUTPATIENT)
Dept: LAB | Facility: HOSPITAL | Age: 84
End: 2024-09-26
Payer: MEDICARE

## 2024-09-26 VITALS
TEMPERATURE: 97.9 F | HEIGHT: 71 IN | HEART RATE: 71 BPM | WEIGHT: 169.6 LBS | SYSTOLIC BLOOD PRESSURE: 177 MMHG | DIASTOLIC BLOOD PRESSURE: 87 MMHG | BODY MASS INDEX: 23.74 KG/M2 | OXYGEN SATURATION: 94 % | RESPIRATION RATE: 14 BRPM

## 2024-09-26 VITALS
HEART RATE: 67 BPM | DIASTOLIC BLOOD PRESSURE: 104 MMHG | OXYGEN SATURATION: 98 % | SYSTOLIC BLOOD PRESSURE: 200 MMHG | RESPIRATION RATE: 16 BRPM

## 2024-09-26 DIAGNOSIS — I10 ASYMPTOMATIC HYPERTENSION: Primary | ICD-10-CM

## 2024-09-26 DIAGNOSIS — I10 PRIMARY HYPERTENSION: Primary | ICD-10-CM

## 2024-09-26 DIAGNOSIS — K51.00 ULCERATIVE PANCOLITIS: Primary | ICD-10-CM

## 2024-09-26 LAB
QT INTERVAL: 399 MS
QTC INTERVAL: 456 MS

## 2024-09-26 PROCEDURE — 25810000003 SODIUM CHLORIDE 0.9 % SOLUTION 250 ML FLEX CONT: Performed by: STUDENT IN AN ORGANIZED HEALTH CARE EDUCATION/TRAINING PROGRAM

## 2024-09-26 PROCEDURE — 99283 EMERGENCY DEPT VISIT LOW MDM: CPT | Performed by: EMERGENCY MEDICINE

## 2024-09-26 PROCEDURE — 25010000002 INFLIXIMAB PER 10 MG: Performed by: STUDENT IN AN ORGANIZED HEALTH CARE EDUCATION/TRAINING PROGRAM

## 2024-09-26 PROCEDURE — 93010 ELECTROCARDIOGRAM REPORT: CPT | Performed by: INTERNAL MEDICINE

## 2024-09-26 PROCEDURE — 83993 ASSAY FOR CALPROTECTIN FECAL: CPT | Performed by: STUDENT IN AN ORGANIZED HEALTH CARE EDUCATION/TRAINING PROGRAM

## 2024-09-26 PROCEDURE — 93005 ELECTROCARDIOGRAM TRACING: CPT | Performed by: EMERGENCY MEDICINE

## 2024-09-26 PROCEDURE — 96415 CHEMO IV INFUSION ADDL HR: CPT

## 2024-09-26 PROCEDURE — 96413 CHEMO IV INFUSION 1 HR: CPT

## 2024-09-26 RX ORDER — SODIUM CHLORIDE 9 MG/ML
20 INJECTION, SOLUTION INTRAVENOUS ONCE
Status: DISCONTINUED | OUTPATIENT
Start: 2024-09-26 | End: 2024-09-28 | Stop reason: HOSPADM

## 2024-09-26 RX ORDER — SODIUM CHLORIDE 9 MG/ML
20 INJECTION, SOLUTION INTRAVENOUS ONCE
Status: CANCELLED | OUTPATIENT
Start: 2024-10-03

## 2024-09-26 RX ORDER — SODIUM CHLORIDE 9 MG/ML
20 INJECTION, SOLUTION INTRAVENOUS ONCE
OUTPATIENT
Start: 2024-11-21

## 2024-09-26 RX ADMIN — INFLIXIMAB 398.5 MG: 100 INJECTION, POWDER, LYOPHILIZED, FOR SOLUTION INTRAVENOUS at 09:56

## 2024-09-26 NOTE — NURSING NOTE
CALLED PCP OFFICE ABOUT ELEVATED BP. WAS ORDERED TO SEND PT TO ER IF SUSTAINED SINCE PCP WAS OUT OF OFFICE. BP REMAINED ELEVATED.     CALLED MALINA BATISTA @ ED TO GIVE REPORT.     PT GIVEN MEDICATION. PT DISCHARGED FROM [facility] TO ER AT 12:18 PM IN STABLE CONDITION, WITHOUT COMPLAINTS.

## 2024-09-26 NOTE — DISCHARGE INSTRUCTIONS
Continue to monitor your blood pressure twice a day, once in the morning and once in the evening.  Try to sit down and be calm for about 4 to 5 minutes before checking your blood pressure.  Write down the result.  Reasons to return is if you start developing any neurologic symptoms including severe headache, double vision, ringing in your ears, dizziness, lightheadedness, chest pain, or any numbness or weakness in your arms or legs.

## 2024-09-26 NOTE — ED PROVIDER NOTES
"Subjective   History of Present Illness  Patient presents complaining of elevated blood pressure.  Patient was at the [facility] getting a infusion and they noticed that his blood pressure was elevated and once he finished they sent him here for evaluation.  Patient denies any neurologic symptoms including no headache, double vision, ringing in his ears, near syncope, dizziness, nausea, chest pain, shortness of breath, or confusion.  Patient was told recently at a doctor's office his blood pressure was elevated but they did not pursue it.  Patient denies any symptoms.  Patient says he typically does not check his blood pressure at home and does not know the last time he did check it.  Patient did just complete a course of steroids.      Review of Systems   All other systems reviewed and are negative.      Past Medical History:   Diagnosis Date    GI symptoms     \"Inflammation of the GI tract\":       No Known Allergies    Past Surgical History:   Procedure Laterality Date    COLONOSCOPY N/A 6/13/2024    Procedure: COLONOSCOPY WITH BIOPSY;  Surgeon: Teofilo Orlando MD;  Location: [facility];  Service: Gastroenterology;  Laterality: N/A;  PAN Colitis    ENDOSCOPY N/A 6/13/2024    Procedure: ESOPHAGOGASTRODUODENOSCOPY WITH BIOPSY;  Surgeon: Teofilo Orlando MD;  Location: [facility];  Service: Gastroenterology;  Laterality: N/A;  Hiatal Hernia;       History reviewed. No pertinent family history.    Social History     Socioeconomic History    Marital status:    Tobacco Use    Smoking status: Former     Types: Cigarettes     Passive exposure: Past    Smokeless tobacco: Never    Tobacco comments:     Smoked during youth    Vaping Use    Vaping status: Never Used   Substance and Sexual Activity    Alcohol use: Not Currently    Drug use: Never    Sexual activity: Defer           Objective   Physical Exam  Vitals and nursing note reviewed.   HENT:      Head: Normocephalic.      Right Ear: External ear " normal.      Left Ear: External ear normal.      Nose: Nose normal.      Mouth/Throat:      Pharynx: Oropharynx is clear.   Eyes:      Conjunctiva/sclera: Conjunctivae normal.      Pupils: Pupils are equal, round, and reactive to light.   Cardiovascular:      Rate and Rhythm: Normal rate and regular rhythm.      Pulses:           Radial pulses are 2+ on the right side and 2+ on the left side.      Heart sounds: Normal heart sounds.   Pulmonary:      Breath sounds: Normal breath sounds.   Abdominal:      General: Abdomen is flat.      Palpations: Abdomen is soft.   Musculoskeletal:         General: No swelling.      Cervical back: Neck supple.   Skin:     General: Skin is warm and dry.      Capillary Refill: Capillary refill takes 2 to 3 seconds.   Neurological:      Mental Status: He is alert and oriented to person, place, and time.      Cranial Nerves: No cranial nerve deficit.      Sensory: No sensory deficit.      Motor: No weakness.      Coordination: Coordination normal.      Gait: Gait normal.   Psychiatric:         Mood and Affect: Mood normal.         Behavior: Behavior normal.         Procedures           ED Course  ED Course as of 09/26/24 1325   Thu Sep 26, 2024   1250 EKG-rate of 78, A-fib, normal axis, no acute ST elevation or depression.  When compared to EKG from 6/9/2020 forts generally unchanged. [AW]      ED Course User Index  [AW] Donnie Ramos MD                                             Medical Decision Making  Ddx    1315 Pt seen again prior to d/c.  Pt remains asymptomatic; bp improved without treatment. Non-toxic. Comfortable. Ambulating without difficulty.  Tolerating po.  Relaxed breathing.  All questions personally answered at the bedside and all d/c instructions personally reviewed with pt.  Discussed the importance of close outpt. f/u and pt. understands this and agrees to do so.  Pt agrees to return to ED immediately for any new, persistent, or worsening symptoms.  Patient's  spouse was at bedside for the entire evaluation and discharge instructions.    EMR Dragon/Transcription disclaimer:  Much of this encounter note is an electronic transcription/translation of spoken language to printed text, aka voice recognition.  The electronic translation of spoken language may permit erroneous or at times nonsensical words or phrases to be inadvertently transcribed; although I have reviewed the note for such errors, some may still exist so please interpret based on surrounding text content.          Final diagnoses:   Asymptomatic hypertension       ED Disposition  ED Disposition       ED Disposition   Discharge    Condition   Stable    Comment   --               Kaiden Todd, DO  [address] [facility] [address] [phone]    In 1 week           Medication List      No changes were made to your prescriptions during this visit.            Donnie Ramos MD  09/26/24 3654

## 2024-09-26 NOTE — TELEPHONE ENCOUNTER
[facility] CALLED STATING PATIENT BP HAS BEEN 190/90'S-100, MOST RECENT /100 WHILE HAVING HIS INFUSION DONE. PATIENT HAVING NO DIZZINESS AND STATES HE OTHERWISE FEELS FINE. ADVISED PATIENT TO GO TO ED SINCE PCP IS OUT OF THE OFFICE TODAY.     BE ADVISED

## 2024-10-02 LAB — CALPROTECTIN STL-MCNT: 381 UG/G (ref 0–120)

## 2024-10-02 RX ORDER — LOSARTAN POTASSIUM 25 MG/1
25 TABLET ORAL DAILY
Qty: 30 TABLET | Refills: 2 | Status: SHIPPED | OUTPATIENT
Start: 2024-10-02

## 2024-10-03 NOTE — TELEPHONE ENCOUNTER
Pt is wanting to do some BP tracking before starting on the medication. He was under a lot of stress and he is better now. He will do a log and make an appointment with Dr Todd

## 2024-10-03 NOTE — TELEPHONE ENCOUNTER
Going to add losartan to patient medication.  Should be a low dose and now lower too much.  Will need to follow-up in office.

## 2024-10-25 ENCOUNTER — ANESTHESIA EVENT (OUTPATIENT)
Dept: PERIOP | Facility: HOSPITAL | Age: 84
End: 2024-10-25
Payer: MEDICARE

## 2024-10-28 ENCOUNTER — ANESTHESIA (OUTPATIENT)
Dept: PERIOP | Facility: HOSPITAL | Age: 84
End: 2024-10-28
Payer: MEDICARE

## 2024-10-28 ENCOUNTER — HOSPITAL ENCOUNTER (OUTPATIENT)
Facility: HOSPITAL | Age: 84
Setting detail: HOSPITAL OUTPATIENT SURGERY
Discharge: HOME | End: 2024-10-28
Attending: STUDENT IN AN ORGANIZED HEALTH CARE EDUCATION/TRAINING PROGRAM | Admitting: STUDENT IN AN ORGANIZED HEALTH CARE EDUCATION/TRAINING PROGRAM
Payer: MEDICARE

## 2024-10-28 VITALS
HEART RATE: 54 BPM | TEMPERATURE: 97.1 F | SYSTOLIC BLOOD PRESSURE: 134 MMHG | BODY MASS INDEX: 23.65 KG/M2 | RESPIRATION RATE: 18 BRPM | OXYGEN SATURATION: 97 % | WEIGHT: 169.6 LBS | DIASTOLIC BLOOD PRESSURE: 75 MMHG

## 2024-10-28 DIAGNOSIS — K51.00 ULCERATIVE PANCOLITIS: ICD-10-CM

## 2024-10-28 DIAGNOSIS — Z12.11 ENCOUNTER FOR SCREENING COLONOSCOPY: ICD-10-CM

## 2024-10-28 LAB
QT INTERVAL: 401 MS
QTC INTERVAL: 433 MS

## 2024-10-28 PROCEDURE — 88305 TISSUE EXAM BY PATHOLOGIST: CPT | Performed by: STUDENT IN AN ORGANIZED HEALTH CARE EDUCATION/TRAINING PROGRAM

## 2024-10-28 PROCEDURE — 93005 ELECTROCARDIOGRAM TRACING: CPT | Performed by: NURSE ANESTHETIST, CERTIFIED REGISTERED

## 2024-10-28 PROCEDURE — 25010000002 LIDOCAINE 2% SOLUTION

## 2024-10-28 PROCEDURE — 45380 COLONOSCOPY AND BIOPSY: CPT | Performed by: STUDENT IN AN ORGANIZED HEALTH CARE EDUCATION/TRAINING PROGRAM

## 2024-10-28 PROCEDURE — 93010 ELECTROCARDIOGRAM REPORT: CPT | Performed by: INTERNAL MEDICINE

## 2024-10-28 PROCEDURE — 25010000002 PROPOFOL 200 MG/20ML EMULSION

## 2024-10-28 RX ORDER — ONDANSETRON 2 MG/ML
4 INJECTION INTRAMUSCULAR; INTRAVENOUS ONCE AS NEEDED
Status: DISCONTINUED | OUTPATIENT
Start: 2024-10-28 | End: 2024-10-28 | Stop reason: HOSPADM

## 2024-10-28 RX ORDER — SODIUM CHLORIDE 0.9 % (FLUSH) 0.9 %
10 SYRINGE (ML) INJECTION AS NEEDED
Status: DISCONTINUED | OUTPATIENT
Start: 2024-10-28 | End: 2024-10-28 | Stop reason: HOSPADM

## 2024-10-28 RX ORDER — SODIUM CHLORIDE 0.9 % (FLUSH) 0.9 %
3 SYRINGE (ML) INJECTION EVERY 12 HOURS SCHEDULED
Status: DISCONTINUED | OUTPATIENT
Start: 2024-10-28 | End: 2024-10-28 | Stop reason: HOSPADM

## 2024-10-28 RX ORDER — SODIUM CHLORIDE, SODIUM LACTATE, POTASSIUM CHLORIDE, CALCIUM CHLORIDE 600; 310; 30; 20 MG/100ML; MG/100ML; MG/100ML; MG/100ML
30 INJECTION, SOLUTION INTRAVENOUS CONTINUOUS PRN
Status: DISCONTINUED | OUTPATIENT
Start: 2024-10-28 | End: 2024-10-28 | Stop reason: HOSPADM

## 2024-10-28 RX ORDER — PROPOFOL 10 MG/ML
INJECTION, EMULSION INTRAVENOUS AS NEEDED
Status: DISCONTINUED | OUTPATIENT
Start: 2024-10-28 | End: 2024-10-28 | Stop reason: SURG

## 2024-10-28 RX ORDER — SODIUM CHLORIDE 9 MG/ML
40 INJECTION, SOLUTION INTRAVENOUS AS NEEDED
Status: DISCONTINUED | OUTPATIENT
Start: 2024-10-28 | End: 2024-10-28 | Stop reason: HOSPADM

## 2024-10-28 RX ORDER — LIDOCAINE HYDROCHLORIDE 10 MG/ML
0.5 INJECTION, SOLUTION INFILTRATION; PERINEURAL ONCE AS NEEDED
Status: DISCONTINUED | OUTPATIENT
Start: 2024-10-28 | End: 2024-10-28 | Stop reason: HOSPADM

## 2024-10-28 RX ORDER — LIDOCAINE HYDROCHLORIDE 20 MG/ML
INJECTION, SOLUTION INFILTRATION; PERINEURAL AS NEEDED
Status: DISCONTINUED | OUTPATIENT
Start: 2024-10-28 | End: 2024-10-28 | Stop reason: SURG

## 2024-10-28 RX ORDER — SODIUM CHLORIDE, SODIUM LACTATE, POTASSIUM CHLORIDE, CALCIUM CHLORIDE 600; 310; 30; 20 MG/100ML; MG/100ML; MG/100ML; MG/100ML
100 INJECTION, SOLUTION INTRAVENOUS ONCE
Status: DISCONTINUED | OUTPATIENT
Start: 2024-10-28 | End: 2024-10-28 | Stop reason: HOSPADM

## 2024-10-28 RX ORDER — SODIUM CHLORIDE 0.9 % (FLUSH) 0.9 %
10 SYRINGE (ML) INJECTION EVERY 12 HOURS SCHEDULED
Status: DISCONTINUED | OUTPATIENT
Start: 2024-10-28 | End: 2024-10-28 | Stop reason: HOSPADM

## 2024-10-28 RX ADMIN — PROPOFOL 150 MG: 10 INJECTION, EMULSION INTRAVENOUS at 09:02

## 2024-10-28 RX ADMIN — Medication 10 ML: at 09:17

## 2024-10-28 RX ADMIN — LIDOCAINE HYDROCHLORIDE 80 MG: 20 INJECTION, SOLUTION INFILTRATION; PERINEURAL at 09:02

## 2024-10-28 NOTE — H&P
"Patient Care Team:  Kaiden Todd DO as PCP - General (Family Medicine)    CHIEF COMPLAINT:  C/s to assess for endoscopic remission of Ulcerative Pancolitis.     HISTORY OF PRESENT ILLNESS:  C/s to assess for endoscopic remission of Ulcerative Pancolitis.     Past Medical History:   Diagnosis Date    Arthritis     GI symptoms     \"Inflammation of the GI tract\":    Hypertension      Past Surgical History:   Procedure Laterality Date    COLONOSCOPY N/A 2024    Procedure: COLONOSCOPY WITH BIOPSY;  Surgeon: Teofilo Orlando MD;  Location: [facility];  Service: Gastroenterology;  Laterality: N/A;  PAN Colitis    ENDOSCOPY N/A 2024    Procedure: ESOPHAGOGASTRODUODENOSCOPY WITH BIOPSY;  Surgeon: Teofilo Orlando MD;  Location: [facility];  Service: Gastroenterology;  Laterality: N/A;  Hiatal Hernia;     Family History   Problem Relation Age of Onset    Malig Hyperthermia Neg Hx      Social History     Tobacco Use    Smoking status: Former     Current packs/day: 0.00     Types: Cigarettes     Quit date:      Years since quittin.8     Passive exposure: Past    Smokeless tobacco: Never    Tobacco comments:     Smoked during youth    Vaping Use    Vaping status: Never Used   Substance Use Topics    Alcohol use: Not Currently    Drug use: Never     Medications Prior to Admission   Medication Sig Dispense Refill Last Dose/Taking    losartan (COZAAR) 25 MG tablet Take 1 tablet by mouth Daily. 30 tablet 2 10/27/2024    magnesium oxide (MAG-OX) 400 MG tablet Take 1 tablet by mouth Daily.   10/27/2024    pantoprazole (PROTONIX) 40 MG EC tablet Take 1 tablet by mouth 2 (Two) Times a Day Before Meals. 180 tablet 3 10/27/2024    Probiotic Product (Risaquad-2) capsule capsule Take 1 capsule by mouth Daily.   10/27/2024    inFLIXimab (REMICADE IV) Infuse  into a venous catheter.       multivitamin with minerals (MULTIVITAMIN ADULT PO) Take 1 tablet by mouth Daily.       NON FORMULARY Take 1 tablet " by mouth Daily. Coconut oil tablet       simethicone (MYLICON) 80 MG chewable tablet Chew 1.5 tablets by mouth 4 (Four) Times a Day Before Meals & at Bedtime. (Patient not taking: Reported on 8/20/2024) 120 tablet 0      Allergies:  Patient has no known allergies.    REVIEW OF SYSTEMS:  Please see the above history of present illness for pertinent positives and negatives.  The remainder of the patient's systems have been reviewed and are negative.     Vital Signs  Temp:  [97.7 °F (36.5 °C)] 97.7 °F (36.5 °C)    Flowsheet Rows      Flowsheet Row First Filed Value   Admission Height --   Admission Weight 76.9 kg (169 lb 9.6 oz) Documented at 10/28/2024 0746             Physical Exam:  Physical Exam   Constitutional: Patient appears well-developed and well-nourished and in no acute distress   HEENT:   Head: Normocephalic and atraumatic.   Eyes:  Pupils are equal, round, and reactive to light. EOM are intact. Sclerae are anicteric and non-injected.  Mouth and Throat: Patient has moist mucous membranes. Oropharynx is clear of any erythema or exudate.     Neck: Neck supple. No JVD present. No thyromegaly present. No lymphadenopathy present.  Cardiovascular: Regular rate, regular rhythm, S1 normal and S2 normal.  Exam reveals no gallop and no friction rub.  No murmur heard.  Pulmonary/Chest: Lungs are clear to auscultation bilaterally. No respiratory distress. No wheezes. No rhonchi. No rales.   Abdominal: Soft. Bowel sounds are normal. No distension and no mass. There is no hepatosplenomegaly. There is no tenderness.   Musculoskeletal: Normal Muscle tone  Extremities: No edema. Pulses are palpable in all 4 extremities.  Neurological: Patient is alert and oriented to person, place, and time. Cranial nerves II-XII are grossly intact with no focal deficits.  Skin: Skin is warm. No rash noted. Nails show no clubbing.  No cyanosis or erythema.    Debilities/Disabilities Identified: None  Emotional Behavior:  Appropriate     Results Review:   I reviewed the patient's new clinical results.    Lab Results (most recent)       None            Imaging Results (Most Recent)       None          reviewed    ECG/EMG Results (most recent)       Procedure Component Value Units Date/Time    ECG 12 Lead Other; A Fib new onset [[id number]] Collected: 10/28/24 0816     Updated: 10/28/24 0820     QT Interval 401 ms      QTC Interval 433 ms     Narrative:      HEART RATE=70  bpm  RR Pmbkltiw=254  ms  HI Interval=  ms  P Horizontal Axis=  deg  P Front Axis=  deg  QRSD Tvmslmyq=907  ms  QT Dpdiscij=764  ms  VBuH=231  ms  QRS Axis=-28  deg  T Wave Axis=24  deg  - ABNORMAL ECG -  Atrial fibrillation  Ventricular premature complex  Borderline  left axis deviation  No change from prior tracing  Electronically Signed By: Neel Davies ([facility]) 2024-10-28 08:19:47  Date and Time of Study:2024-10-28 08:16:43          reviewed    Assessment & Plan   C/s to assess for endoscopic remission of Ulcerative Pancolitis.  /  colonoscopy      I discussed the patient's findings and my recommendations with patient.     Humberto Guerra MD  10/28/24  09:05 EDT    Time: 10 min prior to procedure.

## 2024-10-28 NOTE — ANESTHESIA PREPROCEDURE EVALUATION
Anesthesia Evaluation     Patient summary reviewed and Nursing notes reviewed   no history of anesthetic complications:   NPO Solid Status: > 8 hours  NPO Liquid Status: > 8 hours           Airway   Mallampati: II  TM distance: >3 FB  Neck ROM: full  No difficulty expected  Dental    (+) poor dentition    Pulmonary - normal exam Smoker: brief history, quit 1975.    ROS comment: PND  Cardiovascular   Exercise tolerance: good (4-7 METS)    Rhythm: irregular  Rate: normal    (+) hypertension well controlled, valvular problems/murmurs murmur, dysrhythmias Atrial Fib, PVD    ROS comment: Aspirin 325, none for 5 days    Neuro/Psych- negative ROS  GI/Hepatic/Renal/Endo    (+) GERD    ROS Comment: Ulcerative pancolitis    Musculoskeletal     Abdominal  - normal exam   Substance History - negative use     OB/GYN          Other   arthritis,     (-) blood dyscrasia              Anesthesia Plan    ASA 3     MAC       Anesthetic plan, risks, benefits, and alternatives have been provided, discussed and informed consent has been obtained with: patient and spouse/significant other.  Pre-procedure education provided  Use of blood products discussed with patient and spouse/significant other  Consented to blood products.    Plan discussed with CRNA.    CODE STATUS:

## 2024-10-28 NOTE — ANESTHESIA POSTPROCEDURE EVALUATION
Patient: Mehnaz Thayer    Procedure Summary       Date: 10/28/24 Room / Location: [facility] ENDOSCOPY 2 /  [facility]    Anesthesia Start: 0856 Anesthesia Stop: 0917    Procedure: COLONOSCOPY FOR SCREENING Diagnosis:       Ulcerative pancolitis      Encounter for screening colonoscopy      (Ulcerative pancolitis [K51.00])      (Encounter for screening colonoscopy [Z12.11])    Surgeons: Humberto Guerra MD Provider: Toya Gomez CRNA    Anesthesia Type: MAC ASA Status: 3            Anesthesia Type: MAC    Vitals  Vitals Value Taken Time   /79 10/28/24 0940   Temp 97.1 °F (36.2 °C) 10/28/24 0920   Pulse 56 10/28/24 0947   Resp 21 10/28/24 0920   SpO2 97 % 10/28/24 0947   Vitals shown include unfiled device data.        Post Anesthesia Care and Evaluation    Patient location during evaluation: PHASE II  Patient participation: complete - patient participated  Level of consciousness: awake  Pain management: adequate    Airway patency: patent  Anesthetic complications: No anesthetic complications  PONV Status: none  Cardiovascular status: acceptable  Respiratory status: acceptable  Hydration status: acceptable

## 2024-10-29 LAB
CYTO UR: NORMAL
LAB AP CASE REPORT: NORMAL
PATH REPORT.FINAL DX SPEC: NORMAL
PATH REPORT.GROSS SPEC: NORMAL

## 2024-11-14 ENCOUNTER — TELEPHONE (OUTPATIENT)
Dept: INFUSION THERAPY | Facility: HOSPITAL | Age: 84
End: 2024-11-14
Payer: MEDICARE

## 2024-11-14 NOTE — TELEPHONE ENCOUNTER
pt called to say he was meeting with Dr. Guerra on 11/19 and he may not be continuing his remicade infusion. tbd.

## 2024-11-19 ENCOUNTER — OFFICE VISIT (OUTPATIENT)
Dept: GASTROENTEROLOGY | Facility: CLINIC | Age: 84
End: 2024-11-19
Payer: MEDICARE

## 2024-11-19 VITALS
BODY MASS INDEX: 24.64 KG/M2 | DIASTOLIC BLOOD PRESSURE: 94 MMHG | HEIGHT: 71 IN | SYSTOLIC BLOOD PRESSURE: 158 MMHG | WEIGHT: 176 LBS

## 2024-11-19 DIAGNOSIS — K51.00 ULCERATIVE PANCOLITIS: Primary | ICD-10-CM

## 2024-11-19 DIAGNOSIS — K21.00 GASTROESOPHAGEAL REFLUX DISEASE WITH ESOPHAGITIS WITHOUT HEMORRHAGE: ICD-10-CM

## 2024-11-19 NOTE — PROGRESS NOTES
"Mehnaz Thayer is a 84 y.o. male with PMH of Ulcerative Pancolitis, GERD + noted below who presents with   Chief Complaint   Patient presents with    Ulcerative Colitis       Subjective     # Ulcerative Pancolitis   - Adherent to Remicade. Denies any complaints such as overt bleeding or diarrhea.  - C/s in 10/2024 had mild patchy inflammation in cecum, ascending colon, and rectum (biopsied and showed mild inactive colitis), 3 sub-cm inflammatory pseudopolyps removed (benign).   - C/s on 2024 showed moderate to severe inflammation extending from rectum to cecum.    - Fecal calprotectin was 5440 on 2024. Repeat fecal calprotectin was 381 in 2024.      # GERD   - Adherent to Protonix 40 mg BID. Denies heartburn.   - EGD on  had grade C esophagitis.             Past Medical History:   Diagnosis Date    Arthritis     GI symptoms     \"Inflammation of the GI tract\":    Hypertension        Social History     Socioeconomic History    Marital status:    Tobacco Use    Smoking status: Former     Current packs/day: 0.00     Types: Cigarettes     Quit date:      Years since quittin.9     Passive exposure: Past    Smokeless tobacco: Never    Tobacco comments:     Smoked during youth    Vaping Use    Vaping status: Never Used   Substance and Sexual Activity    Alcohol use: Not Currently    Drug use: Never    Sexual activity: Defer         Current Outpatient Medications:     inFLIXimab (REMICADE IV), Infuse  into a venous catheter., Disp: , Rfl:     losartan (COZAAR) 25 MG tablet, Take 1 tablet by mouth Daily., Disp: 30 tablet, Rfl: 2    magnesium oxide (MAG-OX) 400 MG tablet, Take 1 tablet by mouth Daily., Disp: , Rfl:     multivitamin with minerals (MULTIVITAMIN ADULT PO), Take 1 tablet by mouth Daily., Disp: , Rfl:     NON FORMULARY, Take 1 tablet by mouth Daily. Coconut oil tablet, Disp: , Rfl:     pantoprazole (PROTONIX) 40 MG EC tablet, Take 1 tablet by mouth 2 (Two) Times a Day Before Meals., Disp: " 180 tablet, Rfl: 3    Probiotic Product (Risaquad-2) capsule capsule, Take 1 capsule by mouth Daily., Disp: , Rfl:     simethicone (MYLICON) 80 MG chewable tablet, Chew 1.5 tablets by mouth 4 (Four) Times a Day Before Meals & at Bedtime. (Patient not taking: Reported on 11/19/2024), Disp: 120 tablet, Rfl: 0    Objective   Vitals:    11/19/24 1112   BP: 158/94         11/19/24  1112   Weight: 79.8 kg (176 lb)     Body mass index is 24.56 kg/m².      Physical Exam  Vitals reviewed.   Constitutional:       Appearance: Normal appearance.   HENT:      Head: Normocephalic and atraumatic.   Eyes:      Extraocular Movements: Extraocular movements intact.      Conjunctiva/sclera: Conjunctivae normal.   Cardiovascular:      Rate and Rhythm: Normal rate and regular rhythm.      Heart sounds: Normal heart sounds.   Pulmonary:      Effort: Pulmonary effort is normal.      Breath sounds: Normal breath sounds.   Abdominal:      General: Abdomen is flat. Bowel sounds are normal. There is no distension.      Palpations: Abdomen is soft.      Tenderness: There is no abdominal tenderness.   Neurological:      Mental Status: He is alert.   Psychiatric:         Mood and Affect: Mood normal.         Behavior: Behavior normal.         WBC   Date Value Ref Range Status   07/16/2024 14.1 (H) 3.4 - 10.8 x10E3/uL Final     RBC   Date Value Ref Range Status   07/16/2024 4.44 4.14 - 5.80 x10E6/uL Final     Hemoglobin   Date Value Ref Range Status   07/16/2024 13.2 13.0 - 17.7 g/dL Final   06/17/2024 12.7 (L) 13.0 - 17.7 g/dL Final     Hematocrit   Date Value Ref Range Status   07/16/2024 40.1 37.5 - 51.0 % Final   06/17/2024 38.2 37.5 - 51.0 % Final     MCV   Date Value Ref Range Status   07/16/2024 90 79 - 97 fL Final   06/17/2024 88.8 79.0 - 97.0 fL Final     MCH   Date Value Ref Range Status   07/16/2024 29.7 26.6 - 33.0 pg Final   06/17/2024 29.5 26.6 - 33.0 pg Final     MCHC   Date Value Ref Range Status   07/16/2024 32.9 31.5 - 35.7 g/dL  Final   06/17/2024 33.2 31.5 - 35.7 g/dL Final     RDW   Date Value Ref Range Status   07/16/2024 15.7 (H) 11.6 - 15.4 % Final   06/17/2024 14.1 12.3 - 15.4 % Final     RDW-SD   Date Value Ref Range Status   06/17/2024 45.1 37.0 - 54.0 fl Final     MPV   Date Value Ref Range Status   06/17/2024 8.4 6.0 - 12.0 fL Final     Platelets   Date Value Ref Range Status   07/16/2024 338 150 - 450 x10E3/uL Final   06/17/2024 482 (H) 140 - 450 10*3/mm3 Final     Neutrophil Rel %   Date Value Ref Range Status   07/16/2024 71 Not Estab. % Final     Neutrophil %   Date Value Ref Range Status   06/17/2024 73.1 42.7 - 76.0 % Final     Lymphocyte Rel %   Date Value Ref Range Status   07/16/2024 19 Not Estab. % Final     Lymphocyte %   Date Value Ref Range Status   06/17/2024 12.4 (L) 19.6 - 45.3 % Final     Monocyte Rel %   Date Value Ref Range Status   07/16/2024 8 Not Estab. % Final     Monocyte %   Date Value Ref Range Status   06/17/2024 3.6 (L) 5.0 - 12.0 % Final     Eosinophil Rel %   Date Value Ref Range Status   07/16/2024 1 Not Estab. % Final     Eosinophil %   Date Value Ref Range Status   06/17/2024 0.1 (L) 0.3 - 6.2 % Final     Basophil Rel %   Date Value Ref Range Status   07/16/2024 0 Not Estab. % Final     Basophil %   Date Value Ref Range Status   06/17/2024 0.9 0.0 - 1.5 % Final     Immature Grans %   Date Value Ref Range Status   06/17/2024 9.9 (H) 0.0 - 0.5 % Final     Neutrophils Absolute   Date Value Ref Range Status   07/16/2024 10.0 (H) 1.4 - 7.0 x10E3/uL Final     Neutrophils, Absolute   Date Value Ref Range Status   06/17/2024 10.29 (H) 1.70 - 7.00 10*3/mm3 Final     Lymphocytes Absolute   Date Value Ref Range Status   07/16/2024 2.7 0.7 - 3.1 x10E3/uL Final     Lymphocytes, Absolute   Date Value Ref Range Status   06/17/2024 1.74 0.70 - 3.10 10*3/mm3 Final     Monocytes Absolute   Date Value Ref Range Status   07/16/2024 1.2 (H) 0.1 - 0.9 x10E3/uL Final     Monocytes, Absolute   Date Value Ref Range Status    06/17/2024 0.51 0.10 - 0.90 10*3/mm3 Final     Eosinophils Absolute   Date Value Ref Range Status   07/16/2024 0.1 0.0 - 0.4 x10E3/uL Final     Eosinophils, Absolute   Date Value Ref Range Status   06/17/2024 0.01 0.00 - 0.40 10*3/mm3 Final     Basophils Absolute   Date Value Ref Range Status   07/16/2024 0.1 0.0 - 0.2 x10E3/uL Final     Basophils, Absolute   Date Value Ref Range Status   06/17/2024 0.13 0.00 - 0.20 10*3/mm3 Final     Immature Grans, Absolute   Date Value Ref Range Status   06/17/2024 1.39 (H) 0.00 - 0.05 10*3/mm3 Final     nRBC   Date Value Ref Range Status   06/17/2024 0.0 0.0 - 0.2 /100 WBC Final       Lab Results   Component Value Date    GLUCOSE 102 (H) 07/16/2024    BUN 34 (H) 07/16/2024    CREATININE 0.94 07/16/2024    BCR 36 (H) 07/16/2024    CO2 25 07/16/2024    CALCIUM 9.6 07/16/2024    PROTENTOTREF 6.2 07/16/2024    ALBUMIN 3.9 07/16/2024    AST 15 07/16/2024    ALT 16 07/16/2024         Imaging Results (Last 7 Days)       ** No results found for the last 168 hours. **              Assessment & Plan   Diagnoses and all orders for this visit:    1. Ulcerative pancolitis (Primary)  Assessment & Plan:  - Controlled    - Continue Remicade  - Surveillance c/s due in 10/2032      2. Gastroesophageal reflux disease with esophagitis without hemorrhage  Assessment & Plan:  - Controlled. Continue Protonix 40 mg BID. OK to decrease to Protonix 40 mg QD as tolerated            RTC in 6 months     I have discussed the above plan with the patient.  They verbalize understanding and are in agreement with the plan.  They have been advised to contact the office for any questions, concerns, or changes related to their health.

## 2024-11-20 ENCOUNTER — OFFICE VISIT (OUTPATIENT)
Dept: FAMILY MEDICINE CLINIC | Facility: CLINIC | Age: 84
End: 2024-11-20
Payer: MEDICARE

## 2024-11-20 VITALS
HEART RATE: 71 BPM | OXYGEN SATURATION: 98 % | DIASTOLIC BLOOD PRESSURE: 84 MMHG | HEIGHT: 71 IN | BODY MASS INDEX: 24.78 KG/M2 | RESPIRATION RATE: 18 BRPM | SYSTOLIC BLOOD PRESSURE: 178 MMHG | WEIGHT: 177 LBS

## 2024-11-20 DIAGNOSIS — Z00.00 MEDICARE ANNUAL WELLNESS VISIT, SUBSEQUENT: Primary | ICD-10-CM

## 2024-11-20 NOTE — PROGRESS NOTES
Subjective   The ABCs of the Annual Wellness Visit  Medicare Wellness Visit      Mehnaz Thayer is a 84 y.o. patient who presents for a Medicare Wellness Visit.    The following portions of the patient's history were reviewed and   updated as appropriate: allergies, current medications, past family history, past medical history, past social history, past surgical history, and problem list.    Compared to one year ago, the patient's physical   health is the same.  Compared to one year ago, the patient's mental   health is the same.    Recent Hospitalizations:  This patient has had a [facility] admission record on file within the last 365 days.  Current Medical Providers:  Patient Care Team:  Kaiden Todd DO as PCP - General (Family Medicine)  Nani Carrillo RN as Ambulatory  ([facility])    Outpatient Medications Prior to Visit   Medication Sig Dispense Refill    inFLIXimab (REMICADE IV) Infuse  into a venous catheter.      magnesium oxide (MAG-OX) 400 MG tablet Take 1 tablet by mouth Daily.      multivitamin with minerals (MULTIVITAMIN ADULT PO) Take 1 tablet by mouth Daily.      NON FORMULARY Take 1 tablet by mouth Daily. Coconut oil tablet      pantoprazole (PROTONIX) 40 MG EC tablet Take 1 tablet by mouth 2 (Two) Times a Day Before Meals. 180 tablet 3    Probiotic Product (Risaquad-2) capsule capsule Take 1 capsule by mouth Daily.      losartan (COZAAR) 25 MG tablet Take 1 tablet by mouth Daily. 30 tablet 2    simethicone (MYLICON) 80 MG chewable tablet Chew 1.5 tablets by mouth 4 (Four) Times a Day Before Meals & at Bedtime. 120 tablet 0     No facility-administered medications prior to visit.     No opioid medication identified on active medication list. I have reviewed chart for other potential  high risk medication/s and harmful drug interactions in the elderly.      Aspirin is not on active medication list.  Aspirin use is not indicated based on review of current medical  "condition/s. Risk of harm outweighs potential benefits.  .    Patient Active Problem List   Diagnosis    Colitis    Acute colitis    Chronic diarrhea    Unintentional weight loss    Severe malnutrition    Ulcerative pancolitis    Encounter for screening colonoscopy    Gastroesophageal reflux disease with esophagitis without hemorrhage     Advance Care Planning Advance Directive is not on file.  ACP discussion was held with the patient during this visit. Patient has an advance directive (not in EMR), copy requested.            Objective   Vitals:    24 1028   BP: 178/84   BP Location: Left arm   Patient Position: Sitting   Cuff Size: Adult   Pulse: 71   Resp: 18   SpO2: 98%   Weight: 80.3 kg (177 lb)   Height: 180.3 cm (70.98\")   PainSc: 0-No pain       Estimated body mass index is 24.7 kg/m² as calculated from the following:    Height as of this encounter: 180.3 cm (70.98\").    Weight as of this encounter: 80.3 kg (177 lb).    BMI is within normal parameters. No other follow-up for BMI required.       Does the patient have evidence of cognitive impairment? No, mini mental exam is within normal at this time.                                                                                                 Health  Risk Assessment    Smoking Status:  Social History     Tobacco Use   Smoking Status Former    Current packs/day: 0.00    Types: Cigarettes    Quit date:     Years since quittin.9    Passive exposure: Past   Smokeless Tobacco Never   Tobacco Comments    Smoked during youth      Alcohol Consumption:  Social History     Substance and Sexual Activity   Alcohol Use Not Currently       Fall Risk Screen  STEADI Fall Risk Assessment was completed, and patient is at LOW risk for falls.Assessment completed on:2024    Depression Screening   Little interest or pleasure in doing things? Not at all   Feeling down, depressed, or hopeless? Not at all   PHQ-2 Total Score 0      Health Habits and Functional " and Cognitive Screenin/20/2024    10:29 AM   Functional & Cognitive Status   Do you have difficulty preparing food and eating? No   Do you have difficulty bathing yourself, getting dressed or grooming yourself? No   Do you have difficulty using the toilet? No   Do you have difficulty moving around from place to place? No   Do you have trouble with steps or getting out of a bed or a chair? No   Current Diet Low Fat Diet   Dental Exam Not up to date   Eye Exam Not up to date   Exercise (times per week) 7 times per week   Current Exercises Include Yard Work   Do you need help using the phone?  No   Are you deaf or do you have serious difficulty hearing?  No   Do you need help to go to places out of walking distance? No   Do you need help shopping? No   Do you need help preparing meals?  No   Do you need help with housework?  No   Do you need help with laundry? No   Do you need help taking your medications? No   Do you need help managing money? No   Do you ever drive or ride in a car without wearing a seat belt? No   Have you felt unusual stress, anger or loneliness in the last month? No   Who do you live with? Spouse   If you need help, do you have trouble finding someone available to you? No   Do you have difficulty concentrating, remembering or making decisions? No           Age-appropriate Screening Schedule:  Refer to the list below for future screening recommendations based on patient's age, sex and/or medical conditions. Orders for these recommended tests are listed in the plan section. The patient has been provided with a written plan.    Health Maintenance List  Health Maintenance   Topic Date Due    TDAP/TD VACCINES (1 - Tdap) Never done    ZOSTER VACCINE (1 of 2) Never done    Pneumococcal Vaccine 65+ (1 of 1 - PCV) Never done    RSV Vaccine - Adults (1 - 1-dose 75+ series) Never done    COVID-19 Vaccine ( - -25 season) Never done    INFLUENZA VACCINE  2025 (Originally 2024)    LIPID  PANEL  07/16/2025    ANNUAL WELLNESS VISIT  11/20/2025                                                                                                                                                CMS Preventative Services Quick Reference  Risk Factors Identified During Encounter  None Identified    The above risks/problems have been discussed with the patient.  Pertinent information has been shared with the patient in the After Visit Summary.  An After Visit Summary and PPPS were made available to the patient.    Follow Up:   Next Medicare Wellness visit to be scheduled in 1 year.     Assessment & Plan  Medicare annual wellness visit, subsequent         Assessment & Plan             Follow Up:   No follow-ups on file.

## 2024-11-21 ENCOUNTER — HOSPITAL ENCOUNTER (EMERGENCY)
Facility: HOSPITAL | Age: 84
Discharge: HOME | End: 2024-11-21
Attending: EMERGENCY MEDICINE | Admitting: EMERGENCY MEDICINE
Payer: MEDICARE

## 2024-11-21 ENCOUNTER — TELEPHONE (OUTPATIENT)
Dept: INFUSION THERAPY | Facility: HOSPITAL | Age: 84
End: 2024-11-21
Payer: MEDICARE

## 2024-11-21 ENCOUNTER — TELEPHONE (OUTPATIENT)
Dept: FAMILY MEDICINE CLINIC | Facility: CLINIC | Age: 84
End: 2024-11-21
Payer: MEDICARE

## 2024-11-21 ENCOUNTER — HOSPITAL ENCOUNTER (OUTPATIENT)
Dept: INFUSION THERAPY | Facility: HOSPITAL | Age: 84
Discharge: HOME | End: 2024-11-21
Admitting: STUDENT IN AN ORGANIZED HEALTH CARE EDUCATION/TRAINING PROGRAM
Payer: MEDICARE

## 2024-11-21 ENCOUNTER — TELEPHONE (OUTPATIENT)
Dept: GASTROENTEROLOGY | Facility: CLINIC | Age: 84
End: 2024-11-21
Payer: MEDICARE

## 2024-11-21 VITALS
HEART RATE: 77 BPM | BODY MASS INDEX: 23.94 KG/M2 | DIASTOLIC BLOOD PRESSURE: 72 MMHG | WEIGHT: 171 LBS | OXYGEN SATURATION: 99 % | SYSTOLIC BLOOD PRESSURE: 135 MMHG | TEMPERATURE: 97.7 F | RESPIRATION RATE: 16 BRPM | HEIGHT: 71 IN

## 2024-11-21 VITALS
OXYGEN SATURATION: 98 % | RESPIRATION RATE: 16 BRPM | HEART RATE: 73 BPM | SYSTOLIC BLOOD PRESSURE: 226 MMHG | TEMPERATURE: 97.5 F | WEIGHT: 171 LBS | BODY MASS INDEX: 23.86 KG/M2 | DIASTOLIC BLOOD PRESSURE: 108 MMHG

## 2024-11-21 DIAGNOSIS — I10 UNCONTROLLED HYPERTENSION: Primary | ICD-10-CM

## 2024-11-21 DIAGNOSIS — K51.00 ULCERATIVE PANCOLITIS: Primary | ICD-10-CM

## 2024-11-21 LAB
ALBUMIN SERPL-MCNC: 4.1 G/DL (ref 3.5–5.2)
ALBUMIN/GLOB SERPL: 1.6 G/DL
ALP SERPL-CCNC: 69 U/L (ref 39–117)
ALT SERPL W P-5'-P-CCNC: 10 U/L (ref 1–41)
ANION GAP SERPL CALCULATED.3IONS-SCNC: 9.2 MMOL/L (ref 5–15)
AST SERPL-CCNC: 27 U/L (ref 1–40)
BASOPHILS # BLD AUTO: 0.03 10*3/MM3 (ref 0–0.2)
BASOPHILS NFR BLD AUTO: 0.4 % (ref 0–1.5)
BILIRUB SERPL-MCNC: 0.4 MG/DL (ref 0–1.2)
BUN SERPL-MCNC: 13 MG/DL (ref 8–23)
BUN/CREAT SERPL: 15.9 (ref 7–25)
CALCIUM SPEC-SCNC: 9.3 MG/DL (ref 8.6–10.5)
CHLORIDE SERPL-SCNC: 105 MMOL/L (ref 98–107)
CO2 SERPL-SCNC: 24.8 MMOL/L (ref 22–29)
CREAT SERPL-MCNC: 0.82 MG/DL (ref 0.76–1.27)
DEPRECATED RDW RBC AUTO: 49.7 FL (ref 37–54)
EGFRCR SERPLBLD CKD-EPI 2021: 86.1 ML/MIN/1.73
EOSINOPHIL # BLD AUTO: 0.13 10*3/MM3 (ref 0–0.4)
EOSINOPHIL NFR BLD AUTO: 1.6 % (ref 0.3–6.2)
ERYTHROCYTE [DISTWIDTH] IN BLOOD BY AUTOMATED COUNT: 14.6 % (ref 12.3–15.4)
GLOBULIN UR ELPH-MCNC: 2.6 GM/DL
GLUCOSE SERPL-MCNC: 98 MG/DL (ref 65–99)
HCT VFR BLD AUTO: 42.5 % (ref 37.5–51)
HGB BLD-MCNC: 13.7 G/DL (ref 13–17.7)
IMM GRANULOCYTES # BLD AUTO: 0.01 10*3/MM3 (ref 0–0.05)
IMM GRANULOCYTES NFR BLD AUTO: 0.1 % (ref 0–0.5)
LYMPHOCYTES # BLD AUTO: 2.5 10*3/MM3 (ref 0.7–3.1)
LYMPHOCYTES NFR BLD AUTO: 31.1 % (ref 19.6–45.3)
MCH RBC QN AUTO: 29.5 PG (ref 26.6–33)
MCHC RBC AUTO-ENTMCNC: 32.2 G/DL (ref 31.5–35.7)
MCV RBC AUTO: 91.4 FL (ref 79–97)
MONOCYTES # BLD AUTO: 0.84 10*3/MM3 (ref 0.1–0.9)
MONOCYTES NFR BLD AUTO: 10.4 % (ref 5–12)
NEUTROPHILS NFR BLD AUTO: 4.53 10*3/MM3 (ref 1.7–7)
NEUTROPHILS NFR BLD AUTO: 56.4 % (ref 42.7–76)
PLATELET # BLD AUTO: 255 10*3/MM3 (ref 140–450)
PMV BLD AUTO: 9 FL (ref 6–12)
POTASSIUM SERPL-SCNC: 4.6 MMOL/L (ref 3.5–5.2)
PROT SERPL-MCNC: 6.7 G/DL (ref 6–8.5)
RBC # BLD AUTO: 4.65 10*6/MM3 (ref 4.14–5.8)
SODIUM SERPL-SCNC: 139 MMOL/L (ref 136–145)
WBC NRBC COR # BLD AUTO: 8.04 10*3/MM3 (ref 3.4–10.8)

## 2024-11-21 PROCEDURE — 99283 EMERGENCY DEPT VISIT LOW MDM: CPT | Performed by: EMERGENCY MEDICINE

## 2024-11-21 PROCEDURE — 25010000002 HYDRALAZINE PER 20 MG: Performed by: EMERGENCY MEDICINE

## 2024-11-21 PROCEDURE — 80053 COMPREHEN METABOLIC PANEL: CPT | Performed by: EMERGENCY MEDICINE

## 2024-11-21 PROCEDURE — 99284 EMERGENCY DEPT VISIT MOD MDM: CPT

## 2024-11-21 PROCEDURE — G0463 HOSPITAL OUTPT CLINIC VISIT: HCPCS

## 2024-11-21 PROCEDURE — 96366 THER/PROPH/DIAG IV INF ADDON: CPT

## 2024-11-21 PROCEDURE — 85025 COMPLETE CBC W/AUTO DIFF WBC: CPT | Performed by: EMERGENCY MEDICINE

## 2024-11-21 PROCEDURE — 96365 THER/PROPH/DIAG IV INF INIT: CPT

## 2024-11-21 PROCEDURE — 99283 EMERGENCY DEPT VISIT LOW MDM: CPT

## 2024-11-21 PROCEDURE — 96374 THER/PROPH/DIAG INJ IV PUSH: CPT

## 2024-11-21 RX ORDER — SODIUM CHLORIDE 0.9 % (FLUSH) 0.9 %
10 SYRINGE (ML) INJECTION AS NEEDED
Status: DISCONTINUED | OUTPATIENT
Start: 2024-11-21 | End: 2024-11-21 | Stop reason: HOSPADM

## 2024-11-21 RX ORDER — CLONIDINE HYDROCHLORIDE 0.1 MG/1
0.1 TABLET ORAL ONCE
Status: COMPLETED | OUTPATIENT
Start: 2024-11-21 | End: 2024-11-21

## 2024-11-21 RX ORDER — HYDRALAZINE HYDROCHLORIDE 20 MG/ML
10 INJECTION INTRAMUSCULAR; INTRAVENOUS ONCE
Status: COMPLETED | OUTPATIENT
Start: 2024-11-21 | End: 2024-11-21

## 2024-11-21 RX ORDER — SODIUM CHLORIDE 9 MG/ML
20 INJECTION, SOLUTION INTRAVENOUS ONCE
OUTPATIENT
Start: 2025-01-16

## 2024-11-21 RX ADMIN — HYDRALAZINE HYDROCHLORIDE 10 MG: 20 INJECTION INTRAMUSCULAR; INTRAVENOUS at 11:58

## 2024-11-21 RX ADMIN — CLONIDINE HYDROCHLORIDE 0.1 MG: 0.1 TABLET ORAL at 11:58

## 2024-11-21 NOTE — TELEPHONE ENCOUNTER
[facility] called, patient was supposed to have Remicade infusion today. Patient blood pressure was 220/80 before starting infusion this morning. [facility] checked blood pressure again, BP was 226/108. Per APRN, do not start infusion and send patient to ER, [facility] gave verbal understanding.     APRN spoke with Dr. Todd's nurse Leslee, patient has an appointment at 11:45 am on Monday.

## 2024-11-21 NOTE — NURSING NOTE
09 Patient arrives to [facility] in stable condition with wife. Medications and history reviewed with patient. Patient's BP is elevated upon admission, he states it has been high. He states he took his BP pill this morning. Let him sit awhile and rechecked it, bp continued to be elevated. Doctor was called and patient transported to ER per wheelchair, without complaints. No medications given while in [facility]. Discharged from [facility] to ER at 1115.

## 2024-11-21 NOTE — ED PROVIDER NOTES
"Subjective   History of Present Illness  Patient presents with complaint of high blood pressure.  Patient was at the infusion center and when they checked his vitals his blood pressure was greatly elevated.  This is a common issue patient's been dealing with over the last month.  Patient denies any symptoms including no headache, vision changes, ringing in his ears, difficulty speaking, difficulty thinking, dizziness, or near syncope.  No therapy taken prior to arrival.  Nothing seems to make it better or worse.      Review of Systems   All other systems reviewed and are negative.      Past Medical History:   Diagnosis Date    Arthritis     GI symptoms     \"Inflammation of the GI tract\":    Hypertension        No Known Allergies    Past Surgical History:   Procedure Laterality Date    COLONOSCOPY N/A 2024    Procedure: COLONOSCOPY WITH BIOPSY;  Surgeon: Teofilo Orlando MD;  Location: [facility];  Service: Gastroenterology;  Laterality: N/A;  PAN Colitis    COLONOSCOPY W/ POLYPECTOMY N/A 10/28/2024    Procedure: COLONOSCOPY FOR SCREENING;  Surgeon: Humberto Guerra MD;  Location: [facility] OR;  Service: Gastroenterology;  Laterality: N/A;  colitis; cecal polyp x1 (forceps); cecal bx; ascending polyp x2 (forceps); rectal bx to r/o colitis    ENDOSCOPY N/A 2024    Procedure: ESOPHAGOGASTRODUODENOSCOPY WITH BIOPSY;  Surgeon: Teofilo Orlando MD;  Location: [facility];  Service: Gastroenterology;  Laterality: N/A;  Hiatal Hernia;       Family History   Problem Relation Age of Onset    Malig Hyperthermia Neg Hx        Social History     Socioeconomic History    Marital status:    Tobacco Use    Smoking status: Former     Current packs/day: 0.00     Types: Cigarettes     Quit date:      Years since quittin.9     Passive exposure: Past    Smokeless tobacco: Never    Tobacco comments:     Smoked during youth    Vaping Use    Vaping status: Never Used   Substance and Sexual " Activity    Alcohol use: Not Currently    Drug use: Never    Sexual activity: Defer           Objective   Physical Exam  Vitals and nursing note reviewed.   Constitutional:       Comments: Patient sitting in bed comfortably, talkative, friendly, no signs of distress.  Cooperative with exam.   HENT:      Head: Normocephalic.      Right Ear: External ear normal.      Left Ear: External ear normal.      Nose: Nose normal.      Mouth/Throat:      Pharynx: Oropharynx is clear.   Eyes:      Conjunctiva/sclera: Conjunctivae normal.   Cardiovascular:      Rate and Rhythm: Normal rate and regular rhythm.      Pulses:           Radial pulses are 2+ on the right side and 2+ on the left side.      Heart sounds: Normal heart sounds, S1 normal and S2 normal. No murmur heard.  Pulmonary:      Effort: Pulmonary effort is normal.      Breath sounds: Normal breath sounds.   Abdominal:      General: Abdomen is flat.      Palpations: Abdomen is soft.   Musculoskeletal:      Cervical back: Neck supple.      Right lower leg: No edema.      Left lower leg: No edema.   Skin:     General: Skin is warm and dry.      Capillary Refill: Capillary refill takes 2 to 3 seconds.   Neurological:      Mental Status: He is alert and oriented to person, place, and time.   Psychiatric:         Mood and Affect: Mood normal.         Behavior: Behavior normal.         Procedures           ED Course                                                       Medical Decision Making  Ddx electrolyte abnormality, kidney dysfunction, uncontrolled hypertension, external stimulant use, anxiety    Labs Reviewed  CBC WITH AUTO DIFFERENTIAL - Normal  COMPREHENSIVE METABOLIC PANEL         Narrative: GFR Normal >60                  Chronic Kidney Disease <60                  Kidney Failure <15                                    The GFR formula is only valid for adults with stable renal function between ages 18 and 70.  CBC AND DIFFERENTIAL    1255 Pt seen again prior to  d/c.  Labs reviewed and are unremarkable.  Patient remains asymptomatic.  Blood pressure much improved.  Non-toxic. Comfortable. Ambulating without difficulty.  Tolerating po.  Relaxed breathing.  All questions personally answered at the bedside and all d/c instructions personally reviewed with pt.  Discussed the importance of close outpt. f/u and pt. understands this and agrees to do so.  Pt agrees to return to ED immediately for any new, persistent, or worsening symptoms.    EMR Dragon/Transcription disclaimer:  Much of this encounter note is an electronic transcription/translation of spoken language to printed text, aka voice recognition.  The electronic translation of spoken language may permit erroneous or at times nonsensical words or phrases to be inadvertently transcribed; although I have reviewed the note for such errors, some may still exist so please interpret based on surrounding text content.      Problems Addressed:  Uncontrolled hypertension: complicated acute illness or injury    Amount and/or Complexity of Data Reviewed  Labs: ordered.    Risk  Prescription drug management.        Final diagnoses:   Uncontrolled hypertension       ED Disposition  ED Disposition       ED Disposition   Discharge    Condition   Stable    Comment   --               Kaiden Todd, DO  [address] [facility] [address] [phone]    In 1 day           Medication List      No changes were made to your prescriptions during this visit.            Donnie Ramos MD  11/21/24 1307

## 2024-11-21 NOTE — TELEPHONE ENCOUNTER
Called office in regards to elevated BP. Received orders to send to ER. Informed pt he needed to go to ER. Pt verbalized understanding.

## 2024-11-21 NOTE — TELEPHONE ENCOUNTER
UNABLE TO WARM TRANSFER     Caller: FRANK WITH AMBULATORY     Relationship: PROVIDER    Best call back number: [phone]     What is your medical concern? PATIENTS BLOOD PRESSURE IS STAYING WITHIN 220/80, HAS BEEN SENT TO THE ER AND PROBLEM KEEPS OCCURRING. PROVIDER WOULD LIKE TO KNOW WHAT STEPS IS NEEDING TO BE DONE TO CONTROL HIS BLOOD PRESSURE    How long has this issue been going on? SEVERAL DAYS

## 2024-11-21 NOTE — TELEPHONE ENCOUNTER
Nursing staff notified DELIO of hypertensive episode prior to patient receiving scheduled Remicade infusion.  [facility] nursing staff denied signs and symptoms of anaphylaxis as well as confirmed infusion had not been initiated before obtain VS. encouraged patient to seek further evaluation in the ER due to severity of hypertension of >220/80 which was relayed to patient.    Naty with Dr. Todd contacted via telephone and provided update on patient's symptoms.  An appointment for Monday at 11:45 AM was obtained for patient and Naty encouraged patient to contact her office for any new or worsening concerns following ER evaluation.    All questions answered and support provided.    DELIO Rai

## 2024-11-22 ENCOUNTER — PATIENT OUTREACH (OUTPATIENT)
Dept: CASE MANAGEMENT | Facility: OTHER | Age: 84
End: 2024-11-22
Payer: MEDICARE

## 2024-11-22 NOTE — OUTREACH NOTE
AMBULATORY CASE MANAGEMENT NOTE    Names and Relationships of Patient/Support Persons: Contact: Mehnaz Thayer; Relationship: Self -     Patient Outreach  RN-ACM outreach with patient. Discussed 11/21/24 ED visit regarding uncontrolled hypertension. Patient treated and discharged.  Patient states to be compliant with ED recommendations and states symptoms have improved. Patient states to be compliant with medications and monitoring of blood pressure. Patient has 11/25/24 PCP appointment. Patient states no difficulty with headaches; dizziness; fever; chest pain; SOB; appetite or sleeping. Reviewed with patient ED AVS recommendations; education; role of RN-ACM and HRCM case management services. Patient states to appreciate outreach and declines needs for further outreach at this time. No further questions voiced at this time.   Adult Patient Profile  Questions/Answers      Flowsheet Row Most Recent Value   Symptoms/Conditions Managed at Home cardiovascular   Cardiovascular Symptoms/Conditions hypertension   Cardiovascular Management Strategies medication therapy, other (see comments)  [Physician follow up]   Barriers to Taking Medication as Prescribed none   Equipment Currently Used at Home bp cuff   Primary Source of Support/Comfort spouse   People in Home spouse        Social Work Assessment  Questions/Answers      Flowsheet Row Most Recent Value   People in Home spouse   Functional Status Comments Patient states to be independent with ADL's,  light meal preparation,  transportation and ambulation without assistive device   Equipment Currently Used at Home bp cuff        Send Education  Questions/Answers      Flowsheet Row Most Recent Value   Annual Wellness Visit:  Patient Has Completed   Other Patient Education/Resources  24/7 [facility] Nurse Call Line   24/7 Nurse Call Line Education Method Verbal        SDOH updated and reviewed with the patient during this program:  --     Food Insecurity: No Food  Insecurity (11/22/2024)    Hunger Vital Sign     Worried About Running Out of Food in the Last Year: Never true     Ran Out of Food in the Last Year: Never true      --     Transportation Needs: No Transportation Needs (11/22/2024)    PRAPARE - Transportation     Lack of Transportation (Medical): No     Lack of Transportation (Non-Medical): No       Education Documentation  Unresolved/Worsening Symptoms, taught by Nani Carrillo, RN at 11/22/2024 11:45 AM.  Learner: Patient  Readiness: Acceptance  Method: Explanation  Response: Verbalizes Understanding    Provider Follow-Up, taught by Nani Carrillo, RN at 11/22/2024 11:45 AM.  Learner: Patient  Readiness: Acceptance  Method: Explanation  Response: Verbalizes Understanding    Blood Pressure Monitoring, taught by Nani Carrillo, RN at 11/22/2024 11:45 AM.  Learner: Patient  Readiness: Acceptance  Method: Explanation  Response: Verbalizes Understanding          Nani GUAMAN  Ambulatory Case Management    11/22/2024, 11:45 EST

## 2024-11-25 ENCOUNTER — OFFICE VISIT (OUTPATIENT)
Dept: FAMILY MEDICINE CLINIC | Facility: CLINIC | Age: 84
End: 2024-11-25
Payer: MEDICARE

## 2024-11-25 VITALS
DIASTOLIC BLOOD PRESSURE: 78 MMHG | SYSTOLIC BLOOD PRESSURE: 168 MMHG | HEIGHT: 71 IN | RESPIRATION RATE: 18 BRPM | OXYGEN SATURATION: 97 % | BODY MASS INDEX: 24.36 KG/M2 | WEIGHT: 174 LBS | HEART RATE: 80 BPM

## 2024-11-25 DIAGNOSIS — I10 PRIMARY HYPERTENSION: ICD-10-CM

## 2024-11-25 PROCEDURE — 1126F AMNT PAIN NOTED NONE PRSNT: CPT | Performed by: STUDENT IN AN ORGANIZED HEALTH CARE EDUCATION/TRAINING PROGRAM

## 2024-11-25 PROCEDURE — 99213 OFFICE O/P EST LOW 20 MIN: CPT | Performed by: STUDENT IN AN ORGANIZED HEALTH CARE EDUCATION/TRAINING PROGRAM

## 2024-11-25 PROCEDURE — 1160F RVW MEDS BY RX/DR IN RCRD: CPT | Performed by: STUDENT IN AN ORGANIZED HEALTH CARE EDUCATION/TRAINING PROGRAM

## 2024-11-25 PROCEDURE — 1159F MED LIST DOCD IN RCRD: CPT | Performed by: STUDENT IN AN ORGANIZED HEALTH CARE EDUCATION/TRAINING PROGRAM

## 2024-11-25 RX ORDER — LOSARTAN POTASSIUM 25 MG/1
50 TABLET ORAL DAILY
Qty: 30 TABLET | Refills: 2 | Status: SHIPPED | OUTPATIENT
Start: 2024-11-25

## 2024-11-25 NOTE — PROGRESS NOTES
Kaiden Todd DO  [facility]  [address]  [place] [id number] [phone]    Subjective      Name Mehnaz Thayer MRN [id number]    1940 AGE/SEX 84 y.o. / male      Chief Complaint Chief Complaint   Patient presents with    Hypertension     Blood pressure has been elevated. Was at infusion center last week and and BP was very elevated. His blood pressure has been averaging 170-180/70-90         Visit History for  2024    Mehnaz Thayer is a 84 y.o. male who presented today for Hypertension (Blood pressure has been elevated. Was at infusion center last week and and BP was very elevated. His blood pressure has been averaging 170-180/70-90)       History of Present Illness  The patient presents for evaluation of hypertension.    He is currently on a regimen of blood pressure medication, which he takes in the morning along with his other medications. He has expressed a desire to increase the dosage of his blood pressure medication. He also mentioned that he had consumed a donut prior to his blood work today.       Medications and Allergies   Current Outpatient Medications   Medication Instructions    inFLIXimab (REMICADE IV) Infuse  into a venous catheter.    losartan (COZAAR) 50 mg, Oral, Daily    magnesium oxide (MAG-OX) 400 mg, Daily    multivitamin with minerals (MULTIVITAMIN ADULT PO) 1 tablet, Daily    NON FORMULARY 1 tablet, Daily    pantoprazole (PROTONIX) 40 mg, Oral, 2 Times Daily Before Meals    Probiotic Product (Risaquad-2) capsule capsule 1 capsule, Daily    simethicone (MYLICON) 80 MG chewable tablet Chew 1.5 tablets by mouth 4 (Four) Times a Day Before Meals & at Bedtime.     No Known Allergies   I have reviewed the above medications and allergies     Objective:      Vitals Vitals:    24 1150   BP: 168/78   BP Location: Left arm   Patient Position: Sitting   Cuff Size: Adult   Pulse: 80   Resp: 18   SpO2: 97%   Weight: 78.9 kg (174 lb)  "  Height: 180.3 cm (71\")     Body mass index is 24.27 kg/m².    Physical Exam  Vitals reviewed.   Constitutional:       General: He is not in acute distress.     Appearance: He is not ill-appearing.   Pulmonary:      Effort: Pulmonary effort is normal.   Psychiatric:         Mood and Affect: Mood normal.         Behavior: Behavior normal.         Thought Content: Thought content normal.         Judgment: Judgment normal.          Physical Exam       Results       Assessment/Plan   Issues Addressed/ Plan   Diagnosis Plan   1. Primary hypertension  losartan (COZAAR) 25 MG tablet         Assessment & Plan  1. Hypertension.  His diastolic blood pressure readings are within normal range. The dosage of his current antihypertensive medication will be increased from 25 mg to 50 mg. He is advised to take two tablets simultaneously for a week and monitor his blood pressure. If his systolic blood pressure remains in the 150s, an additional antihypertensive medication, such as amlodipine, will be considered. Kidney function will be checked before adding any new medication. He is instructed to call in a week with his blood pressure readings to assess the effectiveness of the increased dosage.    Follow-up  Return in 1 week for follow up.     BMI is within normal parameters. No other follow-up for BMI required.     There are no Patient Instructions on file for this visit.   Follow up  recommended Return in about 1 week (around 12/2/2024).   - Dragon voice recognition software was utilized to complete this chart.  Every reasonable attempt was made to edit and correct the text, however some incorrect words may remain.   Kaiden Todd DO    Patient or patient representative verbalized consent for the use of Ambient Listening during the visit with  Kaiden Todd DO for chart documentation. 12/9/2024  01:23 EST    "

## 2024-12-26 ENCOUNTER — OFFICE VISIT (OUTPATIENT)
Dept: FAMILY MEDICINE CLINIC | Facility: CLINIC | Age: 84
End: 2024-12-26
Payer: MEDICARE

## 2024-12-26 VITALS
SYSTOLIC BLOOD PRESSURE: 148 MMHG | DIASTOLIC BLOOD PRESSURE: 68 MMHG | BODY MASS INDEX: 24.5 KG/M2 | WEIGHT: 175 LBS | HEART RATE: 90 BPM | HEIGHT: 71 IN | OXYGEN SATURATION: 97 %

## 2024-12-26 DIAGNOSIS — H57.9 ITCHY, WATERY, AND RED EYE: Primary | ICD-10-CM

## 2024-12-26 DIAGNOSIS — I10 PRIMARY HYPERTENSION: ICD-10-CM

## 2024-12-26 DIAGNOSIS — J30.2 SEASONAL ALLERGIES: ICD-10-CM

## 2024-12-26 PROCEDURE — 1126F AMNT PAIN NOTED NONE PRSNT: CPT | Performed by: STUDENT IN AN ORGANIZED HEALTH CARE EDUCATION/TRAINING PROGRAM

## 2024-12-26 PROCEDURE — 99214 OFFICE O/P EST MOD 30 MIN: CPT | Performed by: STUDENT IN AN ORGANIZED HEALTH CARE EDUCATION/TRAINING PROGRAM

## 2024-12-26 PROCEDURE — 1159F MED LIST DOCD IN RCRD: CPT | Performed by: STUDENT IN AN ORGANIZED HEALTH CARE EDUCATION/TRAINING PROGRAM

## 2024-12-26 PROCEDURE — 1160F RVW MEDS BY RX/DR IN RCRD: CPT | Performed by: STUDENT IN AN ORGANIZED HEALTH CARE EDUCATION/TRAINING PROGRAM

## 2024-12-26 RX ORDER — CETIRIZINE HYDROCHLORIDE 5 MG/1
5 TABLET ORAL DAILY
Qty: 30 TABLET | Refills: 2 | Status: SHIPPED | OUTPATIENT
Start: 2024-12-26

## 2024-12-26 RX ORDER — LOSARTAN POTASSIUM 100 MG/1
100 TABLET ORAL DAILY
Qty: 30 TABLET | Refills: 2 | Status: SHIPPED | OUTPATIENT
Start: 2024-12-26

## 2024-12-26 NOTE — PROGRESS NOTES
Kaiden Todd DO  [facility] PRIMARY CARE  [facility]  [place] [id number] [phone]    Subjective      Name Mehnaz Thayer MRN [id number]    1940 AGE/SEX 84 y.o. / male      Chief Complaint Chief Complaint   Patient presents with    Hypertension     One month follow up on blood pressure.          Visit History for  2024    Mehnaz Thayer is a 84 y.o. male who presented today for Hypertension (One month follow up on blood pressure. )       History of Present Illness  He reports experiencing ocular discomfort, characterized by a slimy sensation in his eyes, which he attributes to allergies. This symptom has been persistent for approximately 1 to 2 years, following the placement of ocular lenses in . He has not sought ophthalmological consultation for this issue. He also reports a gritty sensation in his eyes, which he manages with the use of non-medicated eyedrops. Additionally, he mentions an incident where his furniture turned black, which he suspects may be related to his forced air system. He maintains his air filters regularly, changing them every 3 months.    His blood pressure readings have been consistently around 160. He recalls a previous episode where his blood pressure spiked to the 200s, but he did not experience any associated dizziness. He has been monitoring his blood pressure at home and notes that it tends to fluctuate, with occasional readings in the 130s. He is currently on a regimen of two 25 mg tablets for blood pressure management.         Medications and Allergies   Current Outpatient Medications   Medication Instructions    cetirizine (ZYRTEC) 5 mg, Oral, Daily    inFLIXimab (REMICADE IV) Infuse  into a venous catheter.    losartan (COZAAR) 100 mg, Oral, Daily    magnesium oxide (MAG-OX) 400 mg, Daily    multivitamin with minerals (MULTIVITAMIN ADULT PO) 1 tablet, Daily    NON FORMULARY 1 tablet, Daily    pantoprazole (PROTONIX) 40 mg,  "Oral, 2 Times Daily Before Meals    Probiotic Product (Risaquad-2) capsule capsule 1 capsule, Daily    simethicone (MYLICON) 80 MG chewable tablet Chew 1.5 tablets by mouth 4 (Four) Times a Day Before Meals & at Bedtime.     No Known Allergies   I have reviewed the above medications and allergies     Objective:      Vitals Vitals:    12/26/24 1336   BP: 148/68   BP Location: Left arm   Patient Position: Sitting   Cuff Size: Adult   Pulse: 90   SpO2: 97%   Weight: 79.4 kg (175 lb)   Height: 180.3 cm (71\")     Body mass index is 24.41 kg/m².    Physical Exam  Vitals reviewed.   Constitutional:       General: He is not in acute distress.     Appearance: He is not ill-appearing.   Cardiovascular:      Rate and Rhythm: Normal rate and regular rhythm.   Pulmonary:      Effort: Pulmonary effort is normal.      Breath sounds: Normal breath sounds.   Psychiatric:         Mood and Affect: Mood normal.         Behavior: Behavior normal.         Thought Content: Thought content normal.         Judgment: Judgment normal.          Physical Exam  Vital Signs  Blood pressure is 140.     Results       Assessment/Plan   Issues Addressed/ Plan   Diagnosis Plan   1. Itchy, watery, and red eye  cetirizine (zyrTEC) 5 MG tablet      2. Seasonal allergies        3. Primary hypertension  losartan (COZAAR) 100 MG tablet         Assessment & Plan  1. Ocular discomfort.  The symptoms may be attributed to allergic reactions. He has been advised to utilize lubricating eyedrops, specifically non-medicated ones such as Opti-Free. A prescription for cetirizine has been provided to manage potential allergies. He has been instructed to check if the medication is covered by his insurance and to purchase it over-the-counter if it is not covered.    2. Hypertension.  His blood pressure readings have shown a decrease from the previous visit, with a current reading of 140. The target blood pressure range is consistently in the 140s. The dosage of his " current antihypertensive medication will be increased to 100 mg. He has been advised to return in 2 weeks for a blood pressure check, during which he should bring his home blood pressure monitor for accuracy verification. If the increased dosage does not effectively lower his blood pressure, an additional antihypertensive medication may be considered.    Follow-up  The patient will follow up in 3 months.       BMI is within normal parameters. No other follow-up for BMI required.     There are no Patient Instructions on file for this visit.   Follow up  recommended Return in about 3 months (around 3/26/2025).   - Dragon voice recognition software was utilized to complete this chart.  Every reasonable attempt was made to edit and correct the text, however some incorrect words may remain.   Kaiden Todd DO    Patient or patient representative verbalized consent for the use of Ambient Listening during the visit with  Kaiden Todd DO for chart documentation. 1/11/2025  23:37 EST

## 2025-02-20 ENCOUNTER — OFFICE VISIT (OUTPATIENT)
Dept: FAMILY MEDICINE CLINIC | Facility: CLINIC | Age: 85
End: 2025-02-20
Payer: MEDICARE

## 2025-02-20 VITALS
RESPIRATION RATE: 18 BRPM | BODY MASS INDEX: 24.08 KG/M2 | OXYGEN SATURATION: 97 % | SYSTOLIC BLOOD PRESSURE: 148 MMHG | HEART RATE: 43 BPM | HEIGHT: 71 IN | DIASTOLIC BLOOD PRESSURE: 72 MMHG | WEIGHT: 172 LBS

## 2025-02-20 DIAGNOSIS — I10 PRIMARY HYPERTENSION: ICD-10-CM

## 2025-02-20 PROCEDURE — 1159F MED LIST DOCD IN RCRD: CPT | Performed by: STUDENT IN AN ORGANIZED HEALTH CARE EDUCATION/TRAINING PROGRAM

## 2025-02-20 PROCEDURE — 1126F AMNT PAIN NOTED NONE PRSNT: CPT | Performed by: STUDENT IN AN ORGANIZED HEALTH CARE EDUCATION/TRAINING PROGRAM

## 2025-02-20 PROCEDURE — 99213 OFFICE O/P EST LOW 20 MIN: CPT | Performed by: STUDENT IN AN ORGANIZED HEALTH CARE EDUCATION/TRAINING PROGRAM

## 2025-02-20 PROCEDURE — 1160F RVW MEDS BY RX/DR IN RCRD: CPT | Performed by: STUDENT IN AN ORGANIZED HEALTH CARE EDUCATION/TRAINING PROGRAM

## 2025-02-20 RX ORDER — HYDROCHLOROTHIAZIDE 12.5 MG/1
12.5 TABLET ORAL DAILY
Qty: 30 TABLET | Refills: 2 | Status: SHIPPED | OUTPATIENT
Start: 2025-02-20

## 2025-02-20 RX ORDER — LOSARTAN POTASSIUM 100 MG/1
100 TABLET ORAL DAILY
Qty: 30 TABLET | Refills: 2 | Status: SHIPPED | OUTPATIENT
Start: 2025-02-20

## 2025-03-06 ENCOUNTER — POP HEALTH PHARMACY (OUTPATIENT)
Dept: PHARMACY | Facility: OTHER | Age: 85
End: 2025-03-06
Payer: MEDICARE

## 2025-03-06 NOTE — PROGRESS NOTES
Population Health Pharmacy Outreach    Mehnaz Thayer was called today to discuss medication adherence due to apparent barriers in the previous calendar year. After discussion, the following reason(s) were noted for non-adherence:No problems identified. Recommended resolutions include: Directed education.  Introduced self, explained Pharmacy Outreach role and provided contact information. Verified patient name and date of birth.   Discussed losartan and hctz. He takes losartan daily and he knows that his refill is due later in March. He is aware that he has refills for only #30 day. He is not taking Hctz due to getting dizzy after taking for 2 days. He will discuss Hctz with his provider on March 27, 2025. He reports that his blood pressure this morning was 164/77.           Lillian Garcia LPN, 03/06/25, 2:14 PM EST

## 2025-03-10 NOTE — PROGRESS NOTES
"    Kaiden Todd DO  [facility] PRIMARY CARE  [address]  [place] [id number] [phone]    Subjective      Name Mehnaz Thayer MRN [id number]    1940 AGE/SEX 85 y.o. / male      Chief Complaint Chief Complaint   Patient presents with    Hypertension     3 month check up          Visit History for  2025    Mehnaz Thayer is a 85 y.o. male who presented today for Hypertension (3 month check up )       History of Present Illness  He reports a general improvement in his health status. He has been actively engaged in physical activities such as shoveling sidewalks this morning. His home blood pressure readings have been consistently averaging in the 140s, a significant decrease from previous readings of 185. He does not experience any ankle edema. He recalls an episode of cramping above his sock line during a hospital stay, which he attributes to prolonged bed rest. He also reports no urinary issues.       Medications and Allergies   Current Outpatient Medications   Medication Instructions    cetirizine (ZYRTEC) 5 mg, Oral, Daily    hydroCHLOROthiazide 12.5 mg, Oral, Daily    inFLIXimab (REMICADE IV) Infuse  into a venous catheter.    losartan (COZAAR) 100 mg, Oral, Daily    magnesium oxide (MAG-OX) 400 mg, Daily    multivitamin with minerals (MULTIVITAMIN ADULT PO) 1 tablet, Daily    NON FORMULARY 1 tablet, Daily    pantoprazole (PROTONIX) 40 mg, Oral, 2 Times Daily Before Meals    Probiotic Product (Risaquad-2) capsule capsule 1 capsule, Daily    simethicone (MYLICON) 80 MG chewable tablet Chew 1.5 tablets by mouth 4 (Four) Times a Day Before Meals & at Bedtime.     No Known Allergies   I have reviewed the above medications and allergies     Objective:      Vitals Vitals:    25 1300   BP: 148/72   BP Location: Left arm   Patient Position: Sitting   Cuff Size: Adult   Pulse: (!) 43   Resp: 18   SpO2: 97%   Weight: 78 kg (172 lb)   Height: 180.3 cm (71\")     Body mass " index is 23.99 kg/m².    Physical Exam  Vitals reviewed.   Constitutional:       General: He is not in acute distress.     Appearance: He is not ill-appearing.   Pulmonary:      Effort: Pulmonary effort is normal.   Psychiatric:         Mood and Affect: Mood normal.         Behavior: Behavior normal.         Thought Content: Thought content normal.         Judgment: Judgment normal.          Physical Exam  Vital Signs  Blood pressure is elevated.     Results       Assessment/Plan   Issues Addressed/ Plan   Diagnosis Plan   1. Primary hypertension  losartan (COZAAR) 100 MG tablet    hydroCHLOROthiazide 12.5 MG tablet         Assessment & Plan  1. Elevated blood pressure.  His blood pressure readings have been consistently elevated, with home measurements over the past week ranging from 137/65 to 175/85. Today's office reading also indicates hypertension. He reports no current swelling in his ankles, although he has experienced it in the past. A diuretic will be added to his current regimen to help lower his blood pressure below 135. He is informed that this medication may initially increase urination, but this should subside after about a week. He is advised to monitor his blood pressure and report if it drops too low.    Follow-up  The patient will follow up in 1 month for a recheck.     BMI is within normal parameters. No other follow-up for BMI required.     There are no Patient Instructions on file for this visit.   Follow up  recommended Return in about 1 month (around 3/20/2025) for Blood Pressure.   - Dragon voice recognition software was utilized to complete this chart.  Every reasonable attempt was made to edit and correct the text, however some incorrect words may remain.   Kaiden Todd DO    Patient or patient representative verbalized consent for the use of Ambient Listening during the visit with  Kaiden Todd DO for chart documentation. 3/9/2025  23:16 EDT

## 2025-03-27 ENCOUNTER — OFFICE VISIT (OUTPATIENT)
Dept: FAMILY MEDICINE CLINIC | Facility: CLINIC | Age: 85
End: 2025-03-27
Payer: MEDICARE

## 2025-03-27 VITALS
HEART RATE: 55 BPM | DIASTOLIC BLOOD PRESSURE: 74 MMHG | RESPIRATION RATE: 16 BRPM | OXYGEN SATURATION: 100 % | SYSTOLIC BLOOD PRESSURE: 160 MMHG | HEIGHT: 71 IN | WEIGHT: 173 LBS | BODY MASS INDEX: 24.22 KG/M2

## 2025-03-27 DIAGNOSIS — I10 PRIMARY HYPERTENSION: ICD-10-CM

## 2025-03-27 RX ORDER — LOSARTAN POTASSIUM 100 MG/1
100 TABLET ORAL DAILY
Qty: 30 TABLET | Refills: 2 | Status: SHIPPED | OUTPATIENT
Start: 2025-03-27

## 2025-03-27 NOTE — PROGRESS NOTES
Kaiden Todd,   [facility] PRIMARY CARE  [address]  [place] [id number] [phone]    Subjective      Name Mehnaz Thayer MRN [id number]    1940 AGE/SEX 85 y.o. / male      Chief Complaint Chief Complaint   Patient presents with    Hypertension     Check up, blood pressure has been running in the 160's on top. Stopped taking other medication because he felt like BP was get to low and he felt like he was going to pass out          Visit History for  2025    Mehnaz Thayer is a 85 y.o. male who presented today for Hypertension (Check up, blood pressure has been running in the 160's on top. Stopped taking other medication because he felt like BP was get to low and he felt like he was going to pass out )       History of Present Illness  The patient presents for evaluation of hypertension.    He reports a blood pressure reading of 130 systolic, with occasional spikes to 160. He has been monitoring his blood pressure at home, although not on a daily basis. He is currently on losartan and expresses satisfaction with his current treatment regimen. He also mentions that he experiences headaches when his blood pressure drops to around 80.         Medications and Allergies   Current Outpatient Medications   Medication Instructions    cetirizine (ZYRTEC) 5 mg, Oral, Daily    hydroCHLOROthiazide 12.5 mg, Oral, Daily    inFLIXimab (REMICADE IV) Infuse  into a venous catheter.    losartan (COZAAR) 100 mg, Oral, Daily    magnesium oxide (MAG-OX) 400 mg, Daily    multivitamin with minerals (MULTIVITAMIN ADULT PO) 1 tablet, Daily    NON FORMULARY 1 tablet, Daily    pantoprazole (PROTONIX) 40 mg, Oral, 2 Times Daily Before Meals    Probiotic Product (Risaquad-2) capsule capsule 1 capsule, Daily    simethicone (MYLICON) 80 MG chewable tablet Chew 1.5 tablets by mouth 4 (Four) Times a Day Before Meals & at Bedtime.     No Known Allergies   I have reviewed the above medications and  "allergies     Objective:      Vitals Vitals:    03/27/25 1302   BP: 160/74   BP Location: Left arm   Patient Position: Sitting   Cuff Size: Adult   Pulse: 55   Resp: 16   SpO2: 100%   Weight: 78.5 kg (173 lb)   Height: 180.3 cm (71\")     Body mass index is 24.13 kg/m².    Physical Exam  Vitals reviewed.   Constitutional:       General: He is not in acute distress.     Appearance: He is not ill-appearing.   Cardiovascular:      Rate and Rhythm: Normal rate and regular rhythm.   Pulmonary:      Effort: Pulmonary effort is normal.   Psychiatric:         Mood and Affect: Mood normal.         Behavior: Behavior normal.         Thought Content: Thought content normal.         Judgment: Judgment normal.          Physical Exam  Lungs were auscultated.  Heart was examined.    Vital Signs  Blood pressure is 160 systolic. Weight is 170.     Results       Assessment/Plan   Issues Addressed/ Plan   Diagnosis Plan   1. Primary hypertension  losartan (COZAAR) 100 MG tablet         Assessment & Plan  1. Hypertension.  His blood pressure readings have been consistently elevated, with a recent reading of 160. He reported that the new medication was too potent, causing his blood pressure to drop to around 80. He prefers to continue with losartan and will use the new medication only if his blood pressure spikes. He is advised to monitor his blood pressure at home regularly to ensure it does not get too high.     BMI is within normal parameters. No other follow-up for BMI required.     There are no Patient Instructions on file for this visit.   Follow up  recommended Return in about 3 months (around 6/27/2025).   - Dragon voice recognition software was utilized to complete this chart.  Every reasonable attempt was made to edit and correct the text, however some incorrect words may remain.   Kaiden Todd DO    Patient or patient representative verbalized consent for the use of Ambient Listening during the visit with  Kaiden Todd DO " for chart documentation. 4/14/2025  04:46 EDT

## 2025-05-17 DIAGNOSIS — I10 PRIMARY HYPERTENSION: ICD-10-CM

## 2025-05-19 RX ORDER — HYDROCHLOROTHIAZIDE 12.5 MG/1
12.5 TABLET ORAL DAILY
Qty: 90 TABLET | Refills: 1 | Status: SHIPPED | OUTPATIENT
Start: 2025-05-19

## 2025-05-27 ENCOUNTER — OFFICE VISIT (OUTPATIENT)
Dept: GASTROENTEROLOGY | Facility: CLINIC | Age: 85
End: 2025-05-27
Payer: MEDICARE

## 2025-05-27 ENCOUNTER — TELEPHONE (OUTPATIENT)
Dept: GASTROENTEROLOGY | Facility: CLINIC | Age: 85
End: 2025-05-27
Payer: MEDICARE

## 2025-05-27 VITALS
BODY MASS INDEX: 23.49 KG/M2 | WEIGHT: 167.8 LBS | HEIGHT: 71 IN | SYSTOLIC BLOOD PRESSURE: 140 MMHG | DIASTOLIC BLOOD PRESSURE: 60 MMHG

## 2025-05-27 DIAGNOSIS — K51.00 ULCERATIVE PANCOLITIS: Primary | ICD-10-CM

## 2025-05-27 DIAGNOSIS — K21.00 GASTROESOPHAGEAL REFLUX DISEASE WITH ESOPHAGITIS WITHOUT HEMORRHAGE: ICD-10-CM

## 2025-05-27 PROCEDURE — 1159F MED LIST DOCD IN RCRD: CPT

## 2025-05-27 PROCEDURE — 1160F RVW MEDS BY RX/DR IN RCRD: CPT

## 2025-05-27 PROCEDURE — 99214 OFFICE O/P EST MOD 30 MIN: CPT

## 2025-05-27 NOTE — PROGRESS NOTES
Patient or patient representative verbalized consent for the use of Ambient Listening during the visit with  DELIO Rai for chart documentation. 5/27/2025  12:15 EDT    Chief Complaint   Patient presents with    Follow-up         Patient is a 85 y.o. who presents to the office as a new patient to me for management of ulcerative pancolitis.  Last in office visit completed on 11/19/2024 with Dr. Guerra.  Patient has a significant past medical history of ulcerative pancolitis    10/28/2024  Colonoscopy:  mild patchy inflammation in cecum, ascending colon, and rectum (biopsied and showed mild inactive colitis), 3 sub-cm inflammatory pseudopolyps removed (benign)   - POC: Repeat c/s in 8 years for UC surveillance. Continue Remicade as UC is controlled with current regimen   Next Colonoscopy for screening recommended at  8 -year interval due  10/28/2032      Past Surgical History   No pertinent past surgical history    Social History  Former tobacco use quit 1975  Denies use of alcohol or tobacco    Family History  Denies known family history of colon cancer, colon polyps, or IBD    History of Present Illness  The patient is an 85-year-old individual with a diagnosis of ulcerative colitis.    Ulcerative Colitis  - The patient has not received infliximab (Remicade) infusions since November 2024 due to adverse reactions, including significant hypertension with blood pressure readings of 206 mmHg and 226 mmHg.  - They report an overall improvement in well-being since discontinuing the infusions.  - Episodes of diarrhea occur once in the morning and occasionally in the evening, without hematochezia or melena.  - The patient denies experiencing abdominal pain or nocturnal bowel movements.  -Denies unintentional weight loss    Tenesmus  - The patient reports tenesmus, specifically a sensation of needing to urinate before passing flatus.    GERD .  - Continues pantoprazole 40 mg twice daily prior to first and last meal  of the day.  Describes heartburn is overall well-controlled without nausea, vomiting, or dysphagia.    Supplemental information: None     Current/Previous treatment for GERD  Current:  Pantoprazole 40 mg twice daily    Current/Previous treatment for ulcerative pancolitis  Current:  Remicade-last infused November 2024    Medications    Current Outpatient Medications:     cetirizine (zyrTEC) 5 MG tablet, Take 1 tablet by mouth Daily., Disp: 30 tablet, Rfl: 2    hydroCHLOROthiazide 12.5 MG tablet, TAKE 1 TABLET BY MOUTH EVERY DAY, Disp: 90 tablet, Rfl: 1    losartan (COZAAR) 100 MG tablet, Take 1 tablet by mouth Daily., Disp: 30 tablet, Rfl: 2    magnesium oxide (MAG-OX) 400 MG tablet, Take 1 tablet by mouth Daily., Disp: , Rfl:     multivitamin with minerals (MULTIVITAMIN ADULT PO), Take 1 tablet by mouth Daily., Disp: , Rfl:     NON FORMULARY, Take 1 tablet by mouth Daily. Coconut oil tablet, Disp: , Rfl:     pantoprazole (PROTONIX) 40 MG EC tablet, Take 1 tablet by mouth 2 (Two) Times a Day Before Meals., Disp: 180 tablet, Rfl: 3    Probiotic Product (Risaquad-2) capsule capsule, Take 1 capsule by mouth Daily., Disp: , Rfl:     simethicone (MYLICON) 80 MG chewable tablet, Chew 1.5 tablets by mouth 4 (Four) Times a Day Before Meals & at Bedtime., Disp: 120 tablet, Rfl: 0    inFLIXimab (REMICADE IV), Infuse  into a venous catheter. (Patient not taking: Reported on 5/27/2025), Disp: , Rfl:   Result Review :      Common labs          6/17/2024    04:09 7/16/2024    00:00 11/21/2024    12:01   Common Labs   Glucose 144  102  98    BUN 20  34  13    Creatinine 0.76  0.94  0.82    Sodium 135  137  139    Potassium 4.5  5.8  4.6    Chloride 100  100  105    Calcium 8.5  9.6  9.3    Albumin 3.3  3.9  4.1    Total Bilirubin 0.4  0.4  0.4    Alkaline Phosphatase 59  66  69    AST (SGOT) 20  15  27    ALT (SGPT) 21  16  10    WBC 14.07  14.1  8.04    Hemoglobin 12.7  13.2  13.7    Hematocrit 38.2  40.1  42.5    Platelets 482   "338  255    Total Cholesterol  237     Triglycerides  121     HDL Cholesterol  77     LDL Cholesterol   139     Hemoglobin A1C  5.6     PSA  1.6     Office Visit with Humberto Guerra MD (11/19/2024)     Vital Signs:   /60 (BP Location: Left arm, Patient Position: Sitting, Cuff Size: Adult)   Ht 180.3 cm (70.98\")   Wt 76.1 kg (167 lb 12.8 oz)   BMI 23.41 kg/m²     Body mass index is 23.41 kg/m².      Physical Exam  Constitutional:       General: He is not in acute distress.     Appearance: Normal appearance. He is not ill-appearing.   HENT:      Head: Normocephalic.   Eyes:      General: No scleral icterus.  Pulmonary:      Effort: No respiratory distress.   Abdominal:      General: Abdomen is flat. Bowel sounds are normal. There is no distension.      Palpations: Abdomen is soft. There is no mass.      Tenderness: There is no abdominal tenderness. There is no guarding or rebound.      Hernia: No hernia is present.   Skin:     General: Skin is warm and dry.   Neurological:      Mental Status: He is alert.      Gait: Gait normal.   Psychiatric:         Mood and Affect: Mood normal.       Assessment and Plan    Diagnoses and all orders for this visit:    1. Ulcerative pancolitis (Primary)    2. Gastroesophageal reflux disease with esophagitis without hemorrhage       Assessment & Plan  Ulcerative colitis:  - No Remicade since 11/2024, reports improvement despite absence of subsequent infusions.  -Reviewed that contributing factor to hypertensive episode at time of infusion suspected to be related to discontinuation of hypertensive medication regimen which hypertensive regimen has been updated by his primary care provider and I am hopeful this will alleviate this risk.  -Despite discussion patient declines to pursue subsequent Remicade infusions.  - Regular bowel movements occurring 2 times per day with alternating consistencies.  Without blood or dark stools.  Denies urgency or incontinence.  - Discussed " risks of untreated ulcerative colitis: colon injury, toxic megacolon, perforation, death, and colon cancer.  - Explained potential benefits of Entyvio, declined further treatment.  -We also discussed oral Rinvoq versus mesalamine as alternative to infusions including mechanism of action of both medications though patient declined further treatment.  - Suggested Canasa suppository nightly to reduce rectal irritation/tenesmus, declined.  - Encouraged to contact if changing mind or wishing to consult with Dr. Guerra sooner than scheduled follow-up.    GERD:  - Agreeable to continue pantoprazole 40 mg twice daily prior to first and last meal of the day.    Follow-up:  - 8 weeks.    Patient is agreeable to the outlined above treatment plan.  Verbalizes understanding and will contact office for any new or worsening concerns.  All questions answered and support provided.  Patient Instructions   If you would like to start another treatment option for UC as we discussed at time of today's visit please contact our office to proceed     Ways to help reduce heartburn symptoms:    continue Protonix/Pantoprazole 40 mg 2  times per day prior to breakfast and dinner  Avoid eating late night snacks within 3 hours of going to bed  If you have increased abdominal body weight, lifestyle changes to improve weight can help with heartburn symtoms  If you use tobacco products, we recommend that you stop smoking including e-cigarettes  Research has proven that people with heartburn who use tobacco can reduce heartburn symptoms by 44% after they stop smoking.   Sleep on your left side  Sleeping with your head/upper body elevated with a wedge pillow or with the head of the bed inclined   Avoid foods that can trigger symptoms which may include:  citrus fruits  spicy foods,  Tomatoes and Red sauces   Chocolate  coffee/tea  caffeinated or carbonated beverages  Alcohol  High fat foods  peppermint      RADHA Dragon/Transcription Disclaimer:  This  document has been Dictated utilizing Dragon dictation.     Janelle Davis, MSN, APRN, FNP-C   [facility]  Gastroenterology   [address] [place] [address]  [phone] office  [phone] fax

## 2025-05-27 NOTE — PATIENT INSTRUCTIONS
If you would like to start another treatment option for UC as we discussed at time of today's visit please contact our office to proceed     Ways to help reduce heartburn symptoms:    continue Protonix/Pantoprazole 40 mg 2  times per day prior to breakfast and dinner  Avoid eating late night snacks within 3 hours of going to bed  If you have increased abdominal body weight, lifestyle changes to improve weight can help with heartburn symtoms  If you use tobacco products, we recommend that you stop smoking including e-cigarettes  Research has proven that people with heartburn who use tobacco can reduce heartburn symptoms by 44% after they stop smoking.   Sleep on your left side  Sleeping with your head/upper body elevated with a wedge pillow or with the head of the bed inclined   Avoid foods that can trigger symptoms which may include:  citrus fruits  spicy foods,  Tomatoes and Red sauces   Chocolate  coffee/tea  caffeinated or carbonated beverages  Alcohol  High fat foods  peppermint

## 2025-06-30 ENCOUNTER — OFFICE VISIT (OUTPATIENT)
Dept: FAMILY MEDICINE CLINIC | Facility: CLINIC | Age: 85
End: 2025-06-30
Payer: MEDICARE

## 2025-06-30 VITALS
SYSTOLIC BLOOD PRESSURE: 148 MMHG | OXYGEN SATURATION: 99 % | DIASTOLIC BLOOD PRESSURE: 68 MMHG | HEIGHT: 71 IN | WEIGHT: 166.4 LBS | HEART RATE: 75 BPM | BODY MASS INDEX: 23.3 KG/M2 | RESPIRATION RATE: 18 BRPM

## 2025-06-30 DIAGNOSIS — E78.2 MIXED HYPERLIPIDEMIA: Primary | ICD-10-CM

## 2025-06-30 DIAGNOSIS — R73.9 HYPERGLYCEMIA: ICD-10-CM

## 2025-06-30 DIAGNOSIS — H04.129 DRY EYE: ICD-10-CM

## 2025-06-30 DIAGNOSIS — I10 PRIMARY HYPERTENSION: ICD-10-CM

## 2025-06-30 PROCEDURE — 1159F MED LIST DOCD IN RCRD: CPT | Performed by: STUDENT IN AN ORGANIZED HEALTH CARE EDUCATION/TRAINING PROGRAM

## 2025-06-30 PROCEDURE — 99213 OFFICE O/P EST LOW 20 MIN: CPT | Performed by: STUDENT IN AN ORGANIZED HEALTH CARE EDUCATION/TRAINING PROGRAM

## 2025-06-30 PROCEDURE — 1126F AMNT PAIN NOTED NONE PRSNT: CPT | Performed by: STUDENT IN AN ORGANIZED HEALTH CARE EDUCATION/TRAINING PROGRAM

## 2025-06-30 PROCEDURE — 1160F RVW MEDS BY RX/DR IN RCRD: CPT | Performed by: STUDENT IN AN ORGANIZED HEALTH CARE EDUCATION/TRAINING PROGRAM

## 2025-06-30 RX ORDER — CYCLOSPORINE 0.5 MG/ML
1 EMULSION OPHTHALMIC EVERY 12 HOURS
COMMUNITY
Start: 2025-06-23

## 2025-06-30 NOTE — PROGRESS NOTES
Kaiden Todd DO  [facility]  [facility]  [place] [id number] [phone]    Subjective      Name Mehnaz Thayer MRN [id number]    1940 AGE/SEX 85 y.o. / male      Chief Complaint Chief Complaint   Patient presents with    Hypertension     Check up          Visit History for  2025    Mehnaz Thayer is a 85 y.o. male who presented today for Hypertension (Check up )       History of Present Illness  The patient presents for evaluation of dry eyes and elevated blood pressure.    He reports experiencing blurred vision, particularly when watching television, which he describes as akin to looking through water. He sought consultation at [facility], where he was diagnosed with dry eyes. He was prescribed a medication to be administered as 2 drops in each eye twice daily. However, due to the high cost of $1200 per package, he has not been using it. Instead, he has been using TheraTears, an over-the-counter product from [employer]. He was informed that his lower eyelids are drooping, contributing to his symptoms. He was presented with the option of cosmetic surgery or continued use of the prescribed drops. He has not yet tried Restasis. He also reports a slight redness in his left eye, which has improved since using TheraTears. He does not have any air blowing directly into his face but notes that his symptoms worsen when driving with the air conditioner on.    He has discontinued his Remicade infusions due to 2 instances of significantly elevated blood pressure, reaching 206 and 226 systolic respectively. During these episodes, he was administered propranolol, which effectively reduced his blood pressure within 15 minutes.       Medications and Allergies   Current Outpatient Medications   Medication Instructions    cetirizine (ZYRTEC) 5 mg, Oral, Daily    cycloSPORINE (RESTASIS) 0.05 % ophthalmic emulsion 1 drop, Every 12 Hours    hydroCHLOROthiazide 12.5  "mg, Oral, Daily    losartan (COZAAR) 100 mg, Oral, Daily    magnesium oxide (MAG-OX) 400 mg, Daily    multivitamin with minerals (MULTIVITAMIN ADULT PO) 1 tablet, Daily    NON FORMULARY 1 tablet, Daily    pantoprazole (PROTONIX) 40 mg, Oral, 2 Times Daily Before Meals    Probiotic Product (Risaquad-2) capsule capsule 1 capsule, Daily    simethicone (MYLICON) 80 MG chewable tablet Chew 1.5 tablets by mouth 4 (Four) Times a Day Before Meals & at Bedtime.     Allergies   Allergen Reactions    Oxytetracycline Itching      I have reviewed the above medications and allergies     Objective:      Vitals Vitals:    06/30/25 1259   BP: 148/68   BP Location: Left arm   Patient Position: Sitting   Cuff Size: Adult   Pulse: 75   Resp: 18   SpO2: 99%   Weight: 75.5 kg (166 lb 6.4 oz)   Height: 180.3 cm (70.98\")     Body mass index is 23.22 kg/m².    Physical Exam     Physical Exam  General: Alert and oriented, no acute distress.  Eyes: Left eye with mild redness in the bottom, improved with use of TheraTears.     Results       Assessment/Plan   Issues Addressed/ Plan   Diagnosis Plan   1. Mixed hyperlipidemia  Comprehensive Metabolic Panel    Lipid Panel      2. Hyperglycemia  Hemoglobin A1c      3. Primary hypertension  Comprehensive Metabolic Panel      4. Dry eye           Assessment & Plan  1. Dry eyes.  - Reports experiencing blurry vision, particularly when watching TV, which feels like looking through water.  - Has been using TheraTears but finds it expensive and only partially effective.  - Advised to try Blink and Refresh brands of eye drops, which contain polyethylene glycol and provide approximately 4 hours of relief.  - Advised to keep the eye drops handy, especially when driving or watching TV, to use them as needed.    2. Elevated blood pressure.  - Experienced significant increases in blood pressure during previous Remicade infusions, reaching 206 and 226 mmHg, which led to discontinuation of the treatment.  - " Blood pressure was managed with propranolol during the episode.  - A comprehensive blood workup, including lipids and A1c, will be conducted.  - PSA testing will be deferred for another month.     BMI is within normal parameters. No other follow-up for BMI required.     Patient Instructions   You can also use over the counter options, like BLINK and Refresh tears    Follow up  recommended Return in about 1 month (around 7/30/2025).   - Dragon voice recognition software was utilized to complete this chart.  Every reasonable attempt was made to edit and correct the text, however some incorrect words may remain.   Kaiden Todd DO    Patient or patient representative verbalized consent for the use of Ambient Listening during the visit with  Kaiden Todd DO for chart documentation. 7/5/2025  23:36 EDT

## 2025-07-01 LAB
ALBUMIN SERPL-MCNC: 3.9 G/DL (ref 3.5–5.2)
ALBUMIN/GLOB SERPL: 1.5 G/DL
ALP SERPL-CCNC: 79 U/L (ref 39–117)
ALT SERPL-CCNC: 9 U/L (ref 1–41)
AST SERPL-CCNC: 15 U/L (ref 1–40)
BILIRUB SERPL-MCNC: 0.2 MG/DL (ref 0–1.2)
BUN SERPL-MCNC: 16 MG/DL (ref 8–23)
BUN/CREAT SERPL: 15.1 (ref 7–25)
CALCIUM SERPL-MCNC: 9.4 MG/DL (ref 8.6–10.5)
CHLORIDE SERPL-SCNC: 104 MMOL/L (ref 98–107)
CHOLEST SERPL-MCNC: 190 MG/DL (ref 0–200)
CO2 SERPL-SCNC: 25.6 MMOL/L (ref 22–29)
CREAT SERPL-MCNC: 1.06 MG/DL (ref 0.76–1.27)
EGFRCR SERPLBLD CKD-EPI 2021: 68.3 ML/MIN/1.73
GLOBULIN SER CALC-MCNC: 2.6 GM/DL
GLUCOSE SERPL-MCNC: 95 MG/DL (ref 65–99)
HBA1C MFR BLD: 5.3 % (ref 4.8–5.6)
HDLC SERPL-MCNC: 50 MG/DL (ref 40–60)
LDLC SERPL CALC-MCNC: 125 MG/DL (ref 0–100)
POTASSIUM SERPL-SCNC: 4.6 MMOL/L (ref 3.5–5.2)
PROT SERPL-MCNC: 6.5 G/DL (ref 6–8.5)
SODIUM SERPL-SCNC: 138 MMOL/L (ref 136–145)
TRIGL SERPL-MCNC: 84 MG/DL (ref 0–150)
VLDLC SERPL CALC-MCNC: 15 MG/DL (ref 5–40)

## 2025-07-05 ENCOUNTER — RESULTS FOLLOW-UP (OUTPATIENT)
Dept: FAMILY MEDICINE CLINIC | Facility: CLINIC | Age: 85
End: 2025-07-05
Payer: MEDICARE

## 2025-07-17 RX ORDER — PANTOPRAZOLE SODIUM 40 MG/1
40 TABLET, DELAYED RELEASE ORAL
Qty: 180 TABLET | Refills: 3 | Status: SHIPPED | OUTPATIENT
Start: 2025-07-17

## 2025-08-12 ENCOUNTER — OFFICE VISIT (OUTPATIENT)
Dept: GASTROENTEROLOGY | Facility: CLINIC | Age: 85
End: 2025-08-12
Payer: MEDICARE

## 2025-08-12 ENCOUNTER — LAB (OUTPATIENT)
Dept: LAB | Facility: HOSPITAL | Age: 85
End: 2025-08-12
Payer: MEDICARE

## 2025-08-12 VITALS
WEIGHT: 165.8 LBS | BODY MASS INDEX: 23.21 KG/M2 | HEIGHT: 71 IN | SYSTOLIC BLOOD PRESSURE: 142 MMHG | DIASTOLIC BLOOD PRESSURE: 80 MMHG

## 2025-08-12 DIAGNOSIS — K21.00 GASTROESOPHAGEAL REFLUX DISEASE WITH ESOPHAGITIS WITHOUT HEMORRHAGE: ICD-10-CM

## 2025-08-12 DIAGNOSIS — K51.00 ULCERATIVE PANCOLITIS: Primary | ICD-10-CM

## 2025-08-12 DIAGNOSIS — K51.00 ULCERATIVE PANCOLITIS: ICD-10-CM

## 2025-08-12 LAB — CRP SERPL-MCNC: 1.52 MG/DL (ref 0.01–0.5)

## 2025-08-12 PROCEDURE — 1160F RVW MEDS BY RX/DR IN RCRD: CPT | Performed by: STUDENT IN AN ORGANIZED HEALTH CARE EDUCATION/TRAINING PROGRAM

## 2025-08-12 PROCEDURE — 36415 COLL VENOUS BLD VENIPUNCTURE: CPT

## 2025-08-12 PROCEDURE — 1159F MED LIST DOCD IN RCRD: CPT | Performed by: STUDENT IN AN ORGANIZED HEALTH CARE EDUCATION/TRAINING PROGRAM

## 2025-08-12 PROCEDURE — 86141 C-REACTIVE PROTEIN HS: CPT

## 2025-08-12 PROCEDURE — 99214 OFFICE O/P EST MOD 30 MIN: CPT | Performed by: STUDENT IN AN ORGANIZED HEALTH CARE EDUCATION/TRAINING PROGRAM

## 2025-08-18 DIAGNOSIS — K51.00 ULCERATIVE PANCOLITIS: Primary | ICD-10-CM

## 2025-08-18 RX ORDER — UPADACITINIB 45 MG/1
45 TABLET, EXTENDED RELEASE ORAL DAILY
Qty: 30 TABLET | Refills: 1 | Status: SHIPPED | OUTPATIENT
Start: 2025-08-18

## 2025-08-20 ENCOUNTER — SPECIALTY PHARMACY (OUTPATIENT)
Dept: GASTROENTEROLOGY | Facility: CLINIC | Age: 85
End: 2025-08-20
Payer: MEDICARE

## 2025-08-25 ENCOUNTER — SPECIALTY PHARMACY (OUTPATIENT)
Dept: GASTROENTEROLOGY | Facility: CLINIC | Age: 85
End: 2025-08-25
Payer: MEDICARE

## (undated) DEVICE — SYR LL W/SCALE/MARK 3ML STRL

## (undated) DEVICE — KT ORCA ORCAPOD DISP STRL

## (undated) DEVICE — VIAL FORMALIN CAP 10P 40ML

## (undated) DEVICE — BW-412T DISP COMBO CLEANING BRUSH: Brand: SINGLE USE COMBINATION CLEANING BRUSH

## (undated) DEVICE — FRCP BX RADJAW4 NDL 2.8 240CM LG OG BX40

## (undated) DEVICE — LINER SURG CANSTR SXN S/RIGD 1500CC

## (undated) DEVICE — JACKT LAB F/R KNIT CUFF/COLR XLG BLU

## (undated) DEVICE — ADAPT CLN BIOGUARD AIR/H2O DISP

## (undated) DEVICE — SOL IRR H2O BTL 1000ML STRL

## (undated) DEVICE — GLV SURG SENSICARE PI MIC PF SZ8 LF STRL

## (undated) DEVICE — THE BITE BLOCK MAXI, LATEX FREE STRAP IS USED TO PROTECT THE ENDOSCOPE INSERTION TUBE FROM BEING BITTEN BY THE PATIENT.

## (undated) DEVICE — Device